# Patient Record
Sex: MALE | Race: WHITE | Employment: UNEMPLOYED | URBAN - METROPOLITAN AREA
[De-identification: names, ages, dates, MRNs, and addresses within clinical notes are randomized per-mention and may not be internally consistent; named-entity substitution may affect disease eponyms.]

---
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
## 2020-01-07 DIAGNOSIS — E84.0 CYSTIC FIBROSIS WITH PULMONARY MANIFESTATIONS (H): ICD-10-CM

## 2020-01-07 RX ORDER — NEBULIZER
1 EACH MISCELLANEOUS 3 TIMES DAILY
Qty: 1 EACH | Refills: 6 | Status: SHIPPED | OUTPATIENT
Start: 2020-01-07 | End: 2022-03-09

## 2020-01-13 DIAGNOSIS — E84.9 CYSTIC FIBROSIS (H): ICD-10-CM

## 2020-01-14 ENCOUNTER — TELEPHONE (OUTPATIENT)
Dept: PULMONOLOGY | Facility: CLINIC | Age: 20
End: 2020-01-14

## 2020-01-14 NOTE — TELEPHONE ENCOUNTER
Per call to [employer] they will not approve any new medications for 6 months after the date they are approved. There are no other appeal options unless the patient does a plan grievance.

## 2020-01-14 NOTE — TELEPHONE ENCOUNTER
PRIOR AUTHORIZATION DENIED    Medication: elexacaftor-tezacaftor-ivacaftor & ivacaftor (TRIKAFTA) 100-50-75 & 150 MG tablet pack (DENIED    Denial Date: 1/14/2020    Denial Rational: NOT A COVERED BENEFIT    Appeal Information: THERE MAY NOT BE ANY APPEAL OPTIONS

## 2020-01-14 NOTE — TELEPHONE ENCOUNTER
PA Initiation    Medication: elexacaftor-tezacaftor-ivacaftor & ivacaftor (TRIKAFTA) 100-50-75 & 150 MG tablet pack (pending)  Insurance Company: [employer] ([facility]) - Phone [phone] Fax [phone]  Pharmacy Filling the Rx: [facility] - [place] - [address] [place] [address]  Filling Pharmacy Phone:    Filling Pharmacy Fax:    Start Date: 1/14/2020

## 2020-01-31 ENCOUNTER — TELEPHONE (OUTPATIENT)
Dept: PULMONOLOGY | Facility: CLINIC | Age: 20
End: 2020-01-31

## 2020-01-31 NOTE — TELEPHONE ENCOUNTER
PA Initiation    Medication: Cyaston (needs PA through secondary-PENDING  Insurance Company: [facility] - Phone [phone] Fax [phone]  Pharmacy Filling the Rx: [facility] - [place], [place] - [address]  Filling Pharmacy Phone:    Filling Pharmacy Fax:    Start Date: 1/31/2020

## 2020-02-03 ENCOUNTER — ALLIED HEALTH/NURSE VISIT (OUTPATIENT)
Dept: CARE COORDINATION | Facility: CLINIC | Age: 20
End: 2020-02-03

## 2020-02-03 ENCOUNTER — OFFICE VISIT (OUTPATIENT)
Dept: PULMONOLOGY | Facility: CLINIC | Age: 20
End: 2020-02-03
Attending: PHYSICIAN ASSISTANT
Payer: COMMERCIAL

## 2020-02-03 VITALS
OXYGEN SATURATION: 96 % | RESPIRATION RATE: 17 BRPM | DIASTOLIC BLOOD PRESSURE: 77 MMHG | SYSTOLIC BLOOD PRESSURE: 119 MMHG | BODY MASS INDEX: 23.72 KG/M2 | HEIGHT: 68 IN | HEART RATE: 102 BPM | WEIGHT: 156.53 LBS

## 2020-02-03 DIAGNOSIS — E84.9 CF (CYSTIC FIBROSIS) (H): Primary | ICD-10-CM

## 2020-02-03 DIAGNOSIS — E84.0 CYSTIC FIBROSIS WITH PULMONARY MANIFESTATIONS (H): ICD-10-CM

## 2020-02-03 DIAGNOSIS — Z13.9 RISK AND FUNCTIONAL ASSESSMENT: Primary | ICD-10-CM

## 2020-02-03 LAB
EXPTIME-PRE: 7.58 SEC
FEF2575-%PRED-PRE: 81 %
FEF2575-PRE: 3.91 L/SEC
FEF2575-PRED: 4.81 L/SEC
FEFMAX-%PRED-PRE: 103 %
FEFMAX-PRE: 9.99 L/SEC
FEFMAX-PRED: 9.63 L/SEC
FEV1-%PRED-PRE: 90 %
FEV1-PRE: 3.93 L
FEV1FEV6-PRE: 83 %
FEV1FEV6-PRED: 85 %
FEV1FVC-PRE: 83 %
FEV1FVC-PRED: 86 %
FIFMAX-PRE: 7.47 L/SEC
FUNGUS SPEC CULT: NORMAL
FUNGUS SPEC CULT: NORMAL
FVC-%PRED-PRE: 93 %
FVC-PRE: 4.75 L
FVC-PRED: 5.11 L
SPECIMEN SOURCE: NORMAL

## 2020-02-03 PROCEDURE — 87186 SC STD MICRODIL/AGAR DIL: CPT | Performed by: PHYSICIAN ASSISTANT

## 2020-02-03 PROCEDURE — 87077 CULTURE AEROBIC IDENTIFY: CPT | Performed by: PHYSICIAN ASSISTANT

## 2020-02-03 PROCEDURE — 87070 CULTURE OTHR SPECIMN AEROBIC: CPT | Performed by: PHYSICIAN ASSISTANT

## 2020-02-03 RX ORDER — TOBRAMYCIN INHALATION SOLUTION 300 MG/5ML
300 INHALANT RESPIRATORY (INHALATION) 2 TIMES DAILY
Qty: 280 ML | Refills: 6 | Status: SHIPPED | OUTPATIENT
Start: 2020-02-03 | End: 2020-02-17

## 2020-02-03 ASSESSMENT — MIFFLIN-ST. JEOR: SCORE: 1694.5

## 2020-02-03 ASSESSMENT — PAIN SCALES - GENERAL: PAINLEVEL: NO PAIN (0)

## 2020-02-03 NOTE — PATIENT INSTRUCTIONS
Cystic Fibrosis Self-Care Plan       Patient: Keon Conway   MRN: [id number]   Clinic Date: February 3, 2020     RECOMMENDATIONS:  1. Continue nebulizers and vest therapy.   2. Keep up the good work!    Annual Studies:   IGG   Date Value Ref Range Status   05/08/2019 1,120 695 - 1,620 mg/dL Final     Insulin   Date Value Ref Range Status   05/08/2019 5.7 3 - 25 mU/L Final     There are no preventive care reminders to display for this patient.    Pulmonary Function Tests  FEV1: amount of air you can blow out in 1 second  FVC: total amount of air you can take in and blow out    Your Goals:         PFT Latest Ref Rng & Units 2/3/2020   FVC L 4.75   FEV1 L 3.93   FVC% % 93   FEV1% % 90          Airway Clearance: The Most Important Way to Keep Your Lungs Healthy  Vest Settings:    Hill-Rom Frequencies: 8, 9, 10 Pressure 10 Then, Frequencies 18, 19, 20 Pressure 6      RespirTech: Quick Start with Pressure of     Do each frequency for 5 minutes; Deflate vest after each frequency & cough 3 times before beginning the next setting.    Vest and Neb Therapy should be done 2 times/day.    Good Nutrition Can Improve Lung Function and Overall Health     Take ALL of your vitamins with food     Take 1/2 of your enzymes before EVERY meal/snack and the other 1/2 mid-meal/snack    Wt Readings from Last 3 Encounters:   02/03/20 71 kg (156 lb 8.4 oz)   11/04/19 70.1 kg (154 lb 8.7 oz) (49 %)*   08/13/19 70 kg (154 lb 5.2 oz) (50 %)*     * Growth percentiles are based on CDC (Boys, 2-20 Years) data.       Body mass index is 23.8 kg/m .         National CF Foundation Recommendations for BMI in CF Adults: Women: at least 22 Men: at least 23        Controlling Blood Sugars Helps Prevent Lung Infections & Improves Nutrition  Test blood sugar:     In the morning before eating (goal is )     2 hours after a meal (goal is less than 150)     When pre-meal glucose is greater than 150 add correction     At bedtime (if less than 100  eat a snack with 15 grams of carbohydrates  Last A1C Results:   Hemoglobin A1C   Date Value Ref Range Status   07/26/2019 5.1 0 - 5.6 % Final     Comment:     Normal <5.7% Prediabetes 5.7-6.4%  Diabetes 6.5% or higher - adopted from ADA   consensus guidelines.           If diabetic, measure A1C every 6 months. Goal is under 7%.    Staying Healthy    Research: If you are interested in learning about research opportunities or have questions, please contact Lily Daniels at [phone] or [email or web address].       Foundation: Compass is a personalized resource service to help you with the insurance, financial, legal and other issues you are facing.  It's free, confidential and available to anyone with CF.  Ask your  for more information or contact Compass directly at [facility] ([phone]) or compass@cff.org, or learn more at cff.org/compass.       CF Nurse Line:  Pradeep Ken: [phone]   Marcelina Sales, RT: [phone]     Kaye Stark and Willow Mendes , Dieticians: [phone]     Mindy Pedro, Diabetes Nurse: [phone]    Felicia Garcia: [phone] or Lakisha Lay at [phone], Social Workers   [email or web address]

## 2020-02-03 NOTE — PROGRESS NOTES
[facility]  [facility]  February 3, 2020         Assessment and Plan:   Keon Conway is a 20 year old male with cystic fibrosis who is seen today in clinic for routine follow up.    1. CF lung disease: feels at baseline with intermittent cough, no congestion or dyspnea. Sating 96% on room air; vesting BID. PFTtoday stable at his recent best/bseline. 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 No evidence of exacerbation.  - Add on fungal culture today if able  - Continue current nebulizers, Qvar and vest therapy  - Alternating [place] and noemi nebs  - Chronic azithromycin 500 mg three times/week     2. CFTR modulation: received two months of Trikafta and felt well on it without adverse events. No longer on medication secondary to issues with insurance.  - Revisit Trikafta in April      3. Reactive hypoglycemia: with normal AIC and fasting BS, but 2 hour BS of 36 on OGTT and patient completely asymptomatic. Had a BS in the 60s inpatient, not symptomatic. No symptoms at this time.  - Repeat OGTT at annuals     4. Exocrine pancreatic insufficiency: with h/o malnutrition s/p G tube placement. Doing 2 cans of TF about 4-5 times/week now that the tube is not kinking with Reliazorb cartridge. Feels his appetite is good. BMI > Foundation goal.   - Continue pancreatic enzymes and vitamin supplementation  - On nocturnal feelds     5. Hypertriglyceridemia: elevated since 2005, stable this year. Unclear etiology, no h/o DM.   - Monitor with annual studies     RTC: 3 months  Annual studies: July 2020  Vaccinations: influenza complete     Nita Cedeño PA-C  Pulmonary, Allergy, Critical Care and Sleep Medicine        Interval History:   Cough is baseline, minimally productive, no congestion, tightness or shortness of breath. Vesting BID.Getting in 2 cans, 4-5 times/week.  No nausea, bloating or gas. Having 1  stool/day.          Review of Systems:   Please see HPI. Otherwise, complete 10 point ROS negative.           Past Medical and Surgical History:     Past Medical History:   Diagnosis Date     CF (cystic fibrosis) (H) 11/30/2011     Chronic maxillary sinusitis 11/30/2011     Exocrine pancreatic insufficiency 11/30/2011     Past Surgical History:   Procedure Laterality Date     BRONCHOSCOPY (RIGID OR FLEXIBLE), DIAGNOSTIC N/A 10/2/2017    Procedure: COMBINED BRONCHOSCOPY (RIGID OR FLEXIBLE), LAVAGE;  Flexible Bronchoscopy With Lavage, PICC Line Placement ;  Surgeon: Darrel Dudley MD;  [facility]     CATARACT IOL, RT/LT Left      EXAM UNDER ANESTHESIA EYE(S) Left 3/17/2015    Procedure: EXAM UNDER ANESTHESIA EYE(S);  Surgeon: Aamir Taylor MD;  [facility]     HERNIA REPAIR  December 2014     INSERT PICC LINE Right 10/2/2017    Procedure: INSERT PICC LINE;;  Surgeon: Angeles Singh MD;  [facility]     LAPAROSCOPIC ASSISTED INSERTION TUBE GASTROTOMY N/A 10/16/2017    Procedure: LAPAROSCOPIC ASSISTED INSERTION TUBE GASTROSTOMY;  Laparoscopic Gastrostomy Tube Placement.;  Surgeon: Jeremy Obando MD;  [facility]     SCRUB ETHYLENEDIAMENETETRAACETIC (EDTA) Left 3/17/2015    Procedure: SCRUB ETHYLENEDIAMENETETRAACETIC (EDTA);  Surgeon: Aamir Taylor MD;  [facility]     SINUS SURGERY  3/2011           Family History:     Family History   Problem Relation Age of Onset     Diabetes Paternal Grandfather             Social History:     Social History     Socioeconomic History     Marital status: Single     Spouse name: Not on file     Number of children: Not on file     Years of education: Not on file     Highest education level: Not on file   Occupational History     Not on file   Social Needs     Financial resource strain: Not on file     Food insecurity:     Worry: Not on file     Inability: Not on file     Transportation needs:     Medical: Not on file     Non-medical: Not on  file   Tobacco Use     Smoking status: Never Smoker     Smokeless tobacco: Never Used   Substance and Sexual Activity     Alcohol use: No     Drug use: No     Sexual activity: Not on file   Lifestyle     Physical activity:     Days per week: Not on file     Minutes per session: Not on file     Stress: Not on file   Relationships     Social connections:     Talks on phone: Not on file     Gets together: Not on file     Attends Denominational service: Not on file     Active member of club or organization: Not on file     Attends meetings of clubs or organizations: Not on file     Relationship status: Not on file     Intimate partner violence:     Fear of current or ex partner: Not on file     Emotionally abused: Not on file     Physically abused: Not on file     Forced sexual activity: Not on file   Other Topics Concern     Parent/sibling w/ CABG, MI or angioplasty before 65F 55M? Not Asked   Social History Narrative    Mom is 35, Dad 39, both healthy.            Medications:     Current Outpatient Medications   Medication     albuterol (VENTOLIN HFA) 108 (90 Base) MCG/ACT inhaler     amylase-lipase-protease (CREON) 50293-03500 units CPEP per EC capsule     azithromycin (ZITHROMAX) 250 MG tablet     aztreonam lysine (CAYSTON) 75 MG SOLR     beclomethasone (QVAR) 80 MCG/ACT Inhaler     blood glucose (ACCU-CHEK GUIDE) test strip     blood glucose monitoring (ACCU-CHEK FASTCLIX) lancets     dornase alpha (PULMOZYME) 1 MG/ML neb solution     elexacaftor-tezacaftor-ivacaftor & ivacaftor (TRIKAFTA) 100-50-75 & 150 MG tablet pack     ipratropium - albuterol 0.5 mg/2.5 mg/3 mL (DUONEB) 0.5-2.5 (3) MG/3ML neb solution     mvw SOFTGELS  capsule     Nutritional Supplements (NUTREN 2.0)     JOSE ALTERA NEBULIZER SYSTEM MISC     polyethylene glycol (MIRALAX) powder     Respiratory Therapy Supplies (JOSE ALTERA NEBULIZER HANDSET) MISC     sodium chloride inhalant (HYPERSAL) 7 % NEBU neb solution     sodium chloride inhalant  "(HYPERSAL) 7 % NEBU neb solution     tobramycin, PF, (NAMRATA) 300 MG/5ML neb solution     triamcinolone (ARISTOCORT HP) 0.5 % external cream     Current Facility-Administered Medications   Medication     albuterol (PROVENTIL) neb solution 2.5 mg     aztreonam (AZACTAM) 1 g            Physical Exam:   /77   Pulse 102   Resp 17   Ht 1.727 m (5' 8\")   Wt 71 kg (156 lb 8.4 oz)   SpO2 96%   BMI 23.80 kg/m      GENERAL: alert, NAD  HEENT: NCAT, EOMI, anicteric sclera, no nasal edema or erythema, mild drainage in left nare; canals and TMs clear; no oral mucosal edema or erythema  Neck: no cervical or supraclavicular adenopathy  Respiratory: good air flow, very few scattered crackles  CV: RRR, S1S2, no murmurs noted  Abdomen: normoactive BS, soft and non tender   Lymph: no edema, + digital clubbing  Neuro: AAO X 3, CN 2-12 grossly intact  Psychiatric: normal affect, good eye contact  Skin: no rash, jaundice or lesions on limited exam         Data:   All laboratory and imaging data reviewed.      Cystic Fibrosis Culture  Specimen Description   Date Value Ref Range Status   11/04/2019 Throat  Final   08/13/2019 Throat  Final   07/29/2019 Sputum  Final   07/29/2019 Sputum  Final   07/29/2019 Sputum  Final   07/29/2019 Sputum  Final   07/29/2019 Sputum  Final    Culture Micro   Date Value Ref Range Status   11/04/2019 Moderate growth  Normal branden    Final   11/04/2019 Light growth  Pseudomonas aeruginosa   (A)  Final   11/04/2019 (A)  Final    Single colony  Pseudomonas aeruginosa, mucoid strain     11/04/2019 Light growth  Staphylococcus aureus   (A)  Final        PFT interpretation:  Maneuver: valid and meets ATS guidelines  Normal spirometry  Compared to prior: FEV1 of 3.93 is 60 ml below prior          "

## 2020-02-03 NOTE — TELEPHONE ENCOUNTER
Prior Authorization Approval    Authorization Effective Date: 1/16/2020  Authorization Expiration Date: 1/15/2021  Medication: Cyaston (needs PA through secondary-Approved  Approved Dose/Quantity: 75mg  Reference #: PA Ref# [id number]   Insurance Company: [employer] - Phone [phone] Fax [phone]  Expected CoPay: $0     CoPay Card Available: No    Foundation Assistance Needed: N/A  Which Pharmacy is filling the prescription (Not needed for infusion/clinic administered): [facility] - [place], [place] - [address] [facility]   Pharmacy Notified: No  Patient Notified: No

## 2020-02-03 NOTE — LETTER
2/3/2020     RE: Keon Conway  [address]  [facility] [address]     Dear Colleague,    Thank you for referring your patient, Keon Conwya, to the [facility] at [facility]. Please see a copy of my visit note below.    [facility]  February 3, 2020         Assessment and Plan:   Keon Conway is a 20 year old male with cystic fibrosis who is seen today in clinic for routine follow up.    1. CF lung disease: feels at baseline with intermittent cough, no congestion or dyspnea. Sating 96% on room air; vesting BID. PFTtoday stable at his recent best/bseline. 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 No evidence of exacerbation.  - Add on fungal culture today if able  - Continue current nebulizers, Qvar and vest therapy  - Alternating [place] and noemi nebs  - Chronic azithromycin 500 mg three times/week     2. CFTR modulation: received two months of Trikafta and felt well on it without adverse events. No longer on medication secondary to issues with insurance.  - Revisit Trikafta in April      3. Reactive hypoglycemia: with normal AIC and fasting BS, but 2 hour BS of 36 on OGTT and patient completely asymptomatic. Had a BS in the 60s inpatient, not symptomatic. No symptoms at this time.  - Repeat OGTT at annuals     4. Exocrine pancreatic insufficiency: with h/o malnutrition s/p G tube placement. Doing 2 cans of TF about 4-5 times/week now that the tube is not kinking with Reliazorb cartridge. Feels his appetite is good. BMI  > Foundation goal.   - Continue pancreatic enzymes and vitamin supplementation  - On nocturnal feelds     5. Hypertriglyceridemia: elevated since 2005, stable this year. Unclear etiology, no h/o DM.   - Monitor with annual studies     RTC: 3 months  Annual studies: July  2020  Vaccinations: influenza complete     Nita Cedeño PA-C  Pulmonary, Allergy, Critical Care and Sleep Medicine        Interval History:   Cough is baseline, minimally productive, no congestion, tightness or shortness of breath. Vesting BID.Getting in 2 cans, 4-5 times/week.  No nausea, bloating or gas. Having 1 stool/day.          Review of Systems:   Please see HPI. Otherwise, complete 10 point ROS negative.           Past Medical and Surgical History:     Past Medical History:   Diagnosis Date     CF (cystic fibrosis) (H) 11/30/2011     Chronic maxillary sinusitis 11/30/2011     Exocrine pancreatic insufficiency 11/30/2011     Past Surgical History:   Procedure Laterality Date     BRONCHOSCOPY (RIGID OR FLEXIBLE), DIAGNOSTIC N/A 10/2/2017    Procedure: COMBINED BRONCHOSCOPY (RIGID OR FLEXIBLE), LAVAGE;  Flexible Bronchoscopy With Lavage, PICC Line Placement ;  Surgeon: Darrel Dudley MD;  [facility]     CATARACT IOL, RT/LT Left      EXAM UNDER ANESTHESIA EYE(S) Left 3/17/2015    Procedure: EXAM UNDER ANESTHESIA EYE(S);  Surgeon: Aamir Taylor MD;  [facility]     HERNIA REPAIR  December 2014     INSERT PICC LINE Right 10/2/2017    Procedure: INSERT PICC LINE;;  Surgeon: Angeles Singh MD;  [facility]     LAPAROSCOPIC ASSISTED INSERTION TUBE GASTROTOMY N/A 10/16/2017    Procedure: LAPAROSCOPIC ASSISTED INSERTION TUBE GASTROSTOMY;  Laparoscopic Gastrostomy Tube Placement.;  Surgeon: Jeremy Obando MD;  [facility]     SCRUB ETHYLENEDIAMENETETRAACETIC (EDTA) Left 3/17/2015    Procedure: SCRUB ETHYLENEDIAMENETETRAACETIC (EDTA);  Surgeon: Aamir Taylor MD;  [facility]     SINUS SURGERY  3/2011           Family History:     Family History   Problem Relation Age of Onset     Diabetes Paternal Grandfather             Social History:     Social History     Socioeconomic History     Marital status: Single     Spouse name: Not on file     Number of children: Not on file      Years of education: Not on file     Highest education level: Not on file   Occupational History     Not on file   Social Needs     Financial resource strain: Not on file     Food insecurity:     Worry: Not on file     Inability: Not on file     Transportation needs:     Medical: Not on file     Non-medical: Not on file   Tobacco Use     Smoking status: Never Smoker     Smokeless tobacco: Never Used   Substance and Sexual Activity     Alcohol use: No     Drug use: No     Sexual activity: Not on file   Lifestyle     Physical activity:     Days per week: Not on file     Minutes per session: Not on file     Stress: Not on file   Relationships     Social connections:     Talks on phone: Not on file     Gets together: Not on file     Attends Gnosticism service: Not on file     Active member of club or organization: Not on file     Attends meetings of clubs or organizations: Not on file     Relationship status: Not on file     Intimate partner violence:     Fear of current or ex partner: Not on file     Emotionally abused: Not on file     Physically abused: Not on file     Forced sexual activity: Not on file   Other Topics Concern     Parent/sibling w/ CABG, MI or angioplasty before 65F 55M? Not Asked   Social History Narrative    Mom is 35, Dad 39, both healthy.            Medications:     Current Outpatient Medications   Medication     albuterol (VENTOLIN HFA) 108 (90 Base) MCG/ACT inhaler     amylase-lipase-protease (CREON) 05763-54643 units CPEP per EC capsule     azithromycin (ZITHROMAX) 250 MG tablet     aztreonam lysine (CAYSTON) 75 MG SOLR     beclomethasone (QVAR) 80 MCG/ACT Inhaler     blood glucose (ACCU-CHEK GUIDE) test strip     blood glucose monitoring (ACCU-CHEK FASTCLIX) lancets     dornase alpha (PULMOZYME) 1 MG/ML neb solution     elexacaftor-tezacaftor-ivacaftor & ivacaftor (TRIKAFTA) 100-50-75 & 150 MG tablet pack     ipratropium - albuterol 0.5 mg/2.5 mg/3 mL (DUONEB) 0.5-2.5 (3) MG/3ML neb solution      "mvw SOFTGELS  capsule     Nutritional Supplements (NUTREN 2.0)     JOSE ALTERA NEBULIZER SYSTEM MISC     polyethylene glycol (MIRALAX) powder     Respiratory Therapy Supplies (JOSE ALTERA NEBULIZER HANDSET) MISC     sodium chloride inhalant (HYPERSAL) 7 % NEBU neb solution     sodium chloride inhalant (HYPERSAL) 7 % NEBU neb solution     tobramycin, PF, (NAMRATA) 300 MG/5ML neb solution     triamcinolone (ARISTOCORT HP) 0.5 % external cream     Current Facility-Administered Medications   Medication     albuterol (PROVENTIL) neb solution 2.5 mg     aztreonam (AZACTAM) 1 g            Physical Exam:   /77   Pulse 102   Resp 17   Ht 1.727 m (5' 8\")   Wt 71 kg (156 lb 8.4 oz)   SpO2 96%   BMI 23.80 kg/m       GENERAL: alert, NAD  HEENT: NCAT, EOMI, anicteric sclera, no nasal edema or erythema, mild drainage in left nare; canals and TMs clear; no oral mucosal edema or erythema  Neck: no cervical or supraclavicular adenopathy  Respiratory: good air flow, very few scattered crackles  CV: RRR, S1S2, no murmurs noted  Abdomen: normoactive BS, soft and non tender   Lymph: no edema, + digital clubbing  Neuro: AAO X 3, CN 2-12 grossly intact  Psychiatric: normal affect, good eye contact  Skin: no rash, jaundice or lesions on limited exam         Data:   All laboratory and imaging data reviewed.      Cystic Fibrosis Culture  Specimen Description   Date Value Ref Range Status   11/04/2019 Throat  Final   08/13/2019 Throat  Final   07/29/2019 Sputum  Final   07/29/2019 Sputum  Final   07/29/2019 Sputum  Final   07/29/2019 Sputum  Final   07/29/2019 Sputum  Final    Culture Micro   Date Value Ref Range Status   11/04/2019 Moderate growth  Normal branden    Final   11/04/2019 Light growth  Pseudomonas aeruginosa   (A)  Final   11/04/2019 (A)  Final    Single colony  Pseudomonas aeruginosa, mucoid strain     11/04/2019 Light growth  Staphylococcus aureus   (A)  Final        PFT interpretation:  Maneuver: valid and meets " ATS guidelines  Normal spirometry  Compared to prior: FEV1 of 3.93 is 60 ml below prior    Again, thank you for allowing me to participate in the care of your patient.      Sincerely,    Nita Cedeño PA-C

## 2020-02-06 LAB
BACTERIA SPEC CULT: ABNORMAL
SPECIMEN SOURCE: ABNORMAL

## 2020-02-10 ASSESSMENT — ANXIETY QUESTIONNAIRES
7. FEELING AFRAID AS IF SOMETHING AWFUL MIGHT HAPPEN: NOT AT ALL
GAD7 TOTAL SCORE: 0
5. BEING SO RESTLESS THAT IT IS HARD TO SIT STILL: NOT AT ALL
6. BECOMING EASILY ANNOYED OR IRRITABLE: NOT AT ALL
3. WORRYING TOO MUCH ABOUT DIFFERENT THINGS: NOT AT ALL
1. FEELING NERVOUS, ANXIOUS, OR ON EDGE: NOT AT ALL
2. NOT BEING ABLE TO STOP OR CONTROL WORRYING: NOT AT ALL

## 2020-02-10 ASSESSMENT — PATIENT HEALTH QUESTIONNAIRE - PHQ9
SUM OF ALL RESPONSES TO PHQ QUESTIONS 1-9: 0
5. POOR APPETITE OR OVEREATING: NOT AT ALL

## 2020-02-10 NOTE — PROGRESS NOTES
"Adult Cystic Fibrosis Program  Annual Psychosocial Assessment    Presenting Information:  KEON is an 20-year-old male with cystic fibrosis, presenting in CF clinic for a follow up with CF provider, Nita Rivas.  Met with Keon for annual psychosocial assessment. He was joined by his mother, and girlfriend (Miri) during this encounter.     Living situation:  Keon lives with his parents and brother in [place], [place].  His parents own the home. They have 2 dogs. He does not currently pay rent. He denies any related concerns.     Family Constellation:  Keon was raised by his biological parents.  He has 1 sibling(s): an older brother (20 years old).  He notes that all four of his grandparents are still living. His extended family all lives close by. He is not aware of anyone else in his family having CF.     Social Support:  Keon reports good social support.  He gets along well with family members and draws additional support from his girlfriend (Miri) and friends. He does not have any connections in the CF Community at this time.     Adjustment to Illness:  Keon was diagnosed with CF at age 18 months. He reports being somewhat sick often during his childhood with some hospitalizations.     Today he describes his current health status as \"good\" or baseline.  Clinically, he has mild lung disease and pancreatic insufficiency.  He typically does 2 vest treatment(s) per day.  He does not exercise regularly at this time.  He denies any health problems that interfere with activities of daily living.      Keon is open about his CF diagnosis.          Health Care Directive:  Keon has previously received Health Care Directive education. This SW provided overview of education, including concept/purpose of health care directive, default health care agents and how to complete a directive.  Keon is comfortable with his default health care agent(s) (parents) in absence of a directive. He is not currently " "interested in reviewing a health care directive.     Education:  Keon has completed high school. He does not currently have plans for further education. Previous SW provided education on availability of CF scholarship applications should he decide to pursue education in the future. We did not discuss this today.     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 Starting in the Spring and through November he works about 60-70 hours a week. He was laid off since it is the \"off season\" and has not been working this winter. 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 He denies related employment concerns at this time.      Finances:  Keon receives income from wages and also relies on parental support. He does not currently pay rent, for food or medical needs/expenses. He denies any financial concerns at this time.     Insurance:  Keon is insured through his mother's employer policy as well as [facility] He denies any related concerns at this time. He was encouraged to call this SW to report changes/questions/concerns/needs should they arise.     Mental Health/Coping:  Keon denies any current or past symptoms indicative of mood, anxiety, eating, learning or other mental health disorder. He reports a positive and stable mood recently.    Keon was administered the following screens today in clinic:  RADHA-7: score of 0, indicating an absence of anxiety symptoms.   PHQ-9: score of 0, indicating an absence of depression symptoms.     He does not take any medications for mental health and does not see a therapist. Keon reports low/manageable stress levels. He was not able to identify any particular coping skills today.      Keon does not identify elizabeth/spirituality as important in his life but does attend Bahai \"every once in a " "while\".     Chemical Health:  Keon denies the use of alcohol, tobacco or other drugs.     Leisure Activities/Interests:   Keon enjoys going ice fishing and snowmobiling.     Intervention:  -Psychosocial Assessment  -Health Care Directive education    Assessment:  Keon presented with a normal affect and appeared to be open in his responses. He seems to be psychosocially stable overall, with access to relevant resources and supports.  No concerns expressed/noted.    Plan:  Re-consult for any psychosocial needs that may arise.    Complete psychosocial assessment annually.  Continue to follow for regular clinic consult.    JAE Katz, [facility]  Adult Cystic Fibrosis   Ph: [phone], Pager: [phone]            "

## 2020-02-11 ASSESSMENT — ANXIETY QUESTIONNAIRES: GAD7 TOTAL SCORE: 0

## 2020-02-24 ENCOUNTER — TELEPHONE (OUTPATIENT)
Dept: PULMONOLOGY | Facility: CLINIC | Age: 20
End: 2020-02-24

## 2020-02-24 NOTE — TELEPHONE ENCOUNTER
Per chart review, correspondence with patient via [employer] on 2/17/20 regarding prescription for Elgin and Pulmozyme be sent to [facility]. Prescriptions sent to patient's requested pharmacy on 2/17/20.    2/3/20 office visit note by Nita Cedeño reports patient was on Trikafta for 2 months but has since stopped due to insurance. Provider will discuss in April.    Agatha Shipley RN

## 2020-02-24 NOTE — TELEPHONE ENCOUNTER
[facility] Call Center    Phone Message    May a detailed message be left on voicemail: yes     Reason for Call: Medication Question or concern regarding medication   Prescription Clarification  Name of Medication: Elgin, Trikafta, and Pulmozyme  Prescribing Provider: Dr. Cedeño   Pharmacy: [facility]   What on the order needs clarification? Nikia states these 3 medications will need to filled from [facility]. She is submitting the override for this now.           Action Taken: Message routed to:  [facility] Pulm    Travel Screening: Not Applicable

## 2020-03-09 DIAGNOSIS — E84.9 CYSTIC FIBROSIS (H): ICD-10-CM

## 2020-03-09 RX ORDER — ELEXACAFTOR, TEZACAFTOR, AND IVACAFTOR 100-50-75
KIT ORAL
Qty: 84 TABLET | Refills: 11 | Status: SHIPPED | OUTPATIENT
Start: 2020-03-09 | End: 2020-03-10

## 2020-03-09 NOTE — TELEPHONE ENCOUNTER
Prior Authorization Approval    Authorization Effective Date: 3/9/2020  Authorization Expiration Date: 3/9/2021  Medication: elexacaftor-tezacaftor-ivacaftor & ivacaftor (TRIKAFTA) 100-50-75 & 150 MG tablet pack (APPROVED  Approved Dose/Quantity: 84 PER 28 DAYS  Reference #:     Insurance Company: [employer] ([facility]) - Phone [phone] Fax [phone]  Expected CoPay:       CoPay Card Available: Yes    Foundation Assistance Needed:    Which Pharmacy is filling the prescription (Not needed for infusion/clinic administered): [facility] - [address] [place] [address]  Pharmacy Notified:    Patient Notified:

## 2020-03-10 DIAGNOSIS — E84.9 CYSTIC FIBROSIS (H): ICD-10-CM

## 2020-03-10 RX ORDER — ELEXACAFTOR, TEZACAFTOR, AND IVACAFTOR 100-50-75
KIT ORAL
Qty: 84 TABLET | Refills: 11 | Status: SHIPPED | OUTPATIENT
Start: 2020-03-10 | End: 2021-03-16

## 2020-04-14 ENCOUNTER — TELEPHONE (OUTPATIENT)
Dept: PULMONOLOGY | Facility: CLINIC | Age: 20
End: 2020-04-14

## 2020-04-14 NOTE — TELEPHONE ENCOUNTER
Per call to several people at [facility], Keon's insurance through [employer] mandates he fill with [facility] specialty pharmacy. Explained to [facility] that they need to get an override. Rep stated this is the MD responsibility and transferred me to Melissa disla. Who states Keon needs to call [phone] to get this override to fill. Re# [id number].

## 2020-05-22 ENCOUNTER — MYC MEDICAL ADVICE (OUTPATIENT)
Dept: PHARMACY | Facility: CLINIC | Age: 20
End: 2020-05-22

## 2020-05-26 NOTE — PROGRESS NOTES
Keon Conway is a 20 year old male who is being evaluated via a billable telephone visit.      1. CF lung disease: no new pulmonary complaints, has been back on Trikafta and feels well with dry cough, no congestion or dyspnea. Vesting BID.  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 No evidence of exacerbation.  - Continue current nebulizers, Qvar and vest therapy  - Alternating [place] and noemi nebs--on noemi   - Chronic azithromycin 500 mg three times/week     2. 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 Has a plan for labs later this week.   - Will f/u on labs  - Continue Trikafta     3. Reactive hypoglycemia: with normal AIC and fasting BS, but 2 hour BS of 36 on OGTT and patient completely asymptomatic. Had a BS in the 60s inpatient, not symptomatic. No symptoms at this time.  - OGTT at next f/u     4. Exocrine pancreatic insufficiency: with h/o malnutrition s/p G tube placement. Doing 2 cans of TF about 4 times/week. Has not noticed a significant weight gain on Trikafta.  - Continue pancreatic enzymes and vitamin supplementation  - On nocturnal feelds     5. Hypertriglyceridemia: elevated since 2005, stable this year. Unclear etiology, no h/o DM.   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 Cough is minimal, not productive. No congestion or tightness. No shortness of breath, no fever or chills. Vesting is BID. No nausea or bloating. No change in stools, not loose. Has one/day, not fatty or floating. Hasn't weighed himself.     I have reviewed and updated the patient's Past Medical History, Social History, Family History and Medication  "List.    ALLERGIES  Meropenem; Amoxicillin; Cefepime; Penicillins; Sulfa drugs; Tobramycin; and Vancomycin    I have reviewed the note as documented above.  This accurately captures the substance of my conversation with the patient.    Phone call contact time  Call Started at 10:38  Call Ended at 10:49    Nita CONDONANA  [facility]    The patient has been notified of following: \"This telephone visit will be conducted via a call between you and your physician/provider. We have found that certain health care needs can be provided without the need for a physical exam.  This service lets us provide the care you need with a short phone conversation.  If a prescription is necessary we can send it directly to your pharmacy.  If lab work is needed we can place an order for that and you can then stop by our lab to have the test done at a later time. If during the course of the call the physician/provider feels a telephone visit is not appropriate, you will not be charged for this service.\"   "

## 2020-05-27 ENCOUNTER — VIRTUAL VISIT (OUTPATIENT)
Dept: PULMONOLOGY | Facility: CLINIC | Age: 20
End: 2020-05-27
Attending: PHYSICIAN ASSISTANT
Payer: COMMERCIAL

## 2020-05-27 DIAGNOSIS — R73.09 ABNORMAL GLUCOSE TOLERANCE TEST: ICD-10-CM

## 2020-05-27 DIAGNOSIS — K86.81 EXOCRINE PANCREATIC INSUFFICIENCY: ICD-10-CM

## 2020-05-27 DIAGNOSIS — E84.9 CYSTIC FIBROSIS (H): Primary | ICD-10-CM

## 2020-05-27 PROCEDURE — 97803 MED NUTRITION INDIV SUBSEQ: CPT | Mod: TEL,ZF | Performed by: DIETITIAN, REGISTERED

## 2020-05-27 PROCEDURE — 40001009 ZZH VIDEO/TELEPHONE VISIT; NO CHARGE: Mod: 95

## 2020-05-27 NOTE — PATIENT INSTRUCTIONS
Cystic Fibrosis Self-Care Plan       Patient: Keon Conway   MRN: [id number]   Clinic Date: May 27, 2020     RECOMMENDATIONS:  1. Continue nebulizers and vest therapy.   2. Make sure to get your blood draw this week!  3. Enjoy the summer and stay safe/home.    Annual Studies:   IGG   Date Value Ref Range Status   05/08/2019 1,120 695 - 1,620 mg/dL Final     Insulin   Date Value Ref Range Status   05/08/2019 5.7 3 - 25 mU/L Final     There are no preventive care reminders to display for this patient.    Pulmonary Function Tests  FEV1: amount of air you can blow out in 1 second  FVC: total amount of air you can take in and blow out    Your Goals:         PFT Latest Ref Rng & Units 2/3/2020   FVC L 4.75   FEV1 L 3.93   FVC% % 93   FEV1% % 90          Airway Clearance: The Most Important Way to Keep Your Lungs Healthy  Vest Settings:    Hill-Rom Frequencies: 8, 9, 10 Pressure 10 Then, Frequencies 18, 19, 20 Pressure 6      RespirTech: Quick Start with Pressure of     Do each frequency for 5 minutes; Deflate vest after each frequency & cough 3 times before beginning the next setting.    Vest and Neb Therapy should be done 2 times/day.    Good Nutrition Can Improve Lung Function and Overall Health     Take ALL of your vitamins with food     Take 1/2 of your enzymes before EVERY meal/snack and the other 1/2 mid-meal/snack    Wt Readings from Last 3 Encounters:   02/03/20 71 kg (156 lb 8.4 oz)   11/04/19 70.1 kg (154 lb 8.7 oz) (49 %, Z= -0.02)*   08/13/19 70 kg (154 lb 5.2 oz) (50 %, Z= 0.00)*     * Growth percentiles are based on CDC (Boys, 2-20 Years) data.       There is no height or weight on file to calculate BMI.         National CF Foundation Recommendations for BMI in CF Adults: Women: at least 22 Men: at least 23        Controlling Blood Sugars Helps Prevent Lung Infections & Improves Nutrition  Test blood sugar:     In the morning before eating (goal is )     2 hours after a meal (goal is less  than 150)     When pre-meal glucose is greater than 150 add correction     At bedtime (if less than 100 eat a snack with 15 grams of carbohydrates  Last A1C Results:   Hemoglobin A1C   Date Value Ref Range Status   07/26/2019 5.1 0 - 5.6 % Final     Comment:     Normal <5.7% Prediabetes 5.7-6.4%  Diabetes 6.5% or higher - adopted from ADA   consensus guidelines.           If diabetic, measure A1C every 6 months. Goal is under 7%.    Staying Healthy    Research: If you are interested in learning about research opportunities or have questions, please contact Lily Daniels at [phone] or [email or web address].       Foundation: Compass is a personalized resource service to help you with the insurance, financial, legal and other issues you are facing.  It's free, confidential and available to anyone with CF.  Ask your  for more information or contact Compass directly at [address] ([phone]) or compass@cff.org, or learn more at cff.org/compass.       CF Nurse Line:  Gabby Ken and Maryann: [phone]   Marcelina Sales, RT: [phone]     Kaye Stark and Willow Mendes , Dieticians: [phone]     Mindy Pedro, Diabetes Nurse: [phone]    Felicia Garcia: [phone] or Lakisha Lay at [phone], Social Workers   [email or web address]

## 2020-05-27 NOTE — LETTER
"    5/27/2020         RE: Keon Conway  [address] [facility] [facility] [facility] [address]        Dear Colleague,    Thank you for referring your patient, Keon Conway, to the [facility] FOR LUNG SCIENCE AND HEALTH. Please see a copy of my visit note below.    CF Annual Nutrition Assessment    Reason for Assessment  Assessed during [facility] r/t increased nutrition risk with diagnosis of CF per protocol    Keon Gallo is a 20 year old male who is being evaluated via a billable telephone visit.      The patient has been notified of following:     \"This telephone visit will be conducted via a call between you and your physician/provider. We have found that certain health care needs can be provided without the need for a physical exam.  This service lets us provide the care you need with a short phone conversation.  If a prescription is necessary we can send it directly to your pharmacy.  If lab work is needed we can place an order for that and you can then stop by our lab to have the test done at a later time.    Telephone visits are billed at different rates depending on your insurance coverage. During this emergency period, for some insurers they may be billed the same as an in-person visit.  Please reach out to your insurance provider with any questions.    If during the course of the call the physician/provider feels a telephone visit is not appropriate, you will not be charged for this service.\"    Patient has given verbal consent for Telephone visit?  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   Food Insecurity:  None    Anthropometric Assessment  Height:   Ht Readings from Last 1 Encounters:   02/03/20 1.727 m (5' 8\")     IBW based " "on BMI 22 for females and 23 for males per CF Foundation recs: 68.6 kg / 151#  Today's Pt Reported Weight: pt \"has not idea\" most recent wt of 71 kg (156#) %IBW: 103%    BMI: 23.8 kg/m2  Current weight is considered: Normal BMI and at CF goal  Weight history: Although there is no wt documented from the last 4 months, it appears before that the pt's weight was stable for the 6 months prior. Pt noted some wt gained after starting Trikafta about 1 month ago.   Wt Readings from Last 10 Encounters:   20 71 kg (156 lb 8.4 oz)   19 70.1 kg (154 lb 8.7 oz) (49 %, Z= -0.02)*   19 70 kg (154 lb 5.2 oz) (50 %, Z= 0.00)*   19 67.6 kg (149 lb 1.6 oz) (42 %, Z= -0.21)*   19 66.6 kg (146 lb 13.2 oz) (38 %, Z= -0.31)*   19 67.5 kg (148 lb 12.8 oz) (41 %, Z= -0.22)*   19 67.5 kg (148 lb 12.8 oz) (41 %, Z= -0.22)*   19 66.2 kg (145 lb 14.4 oz) (37 %, Z= -0.34)*   19 66.2 kg (145 lb 14.4 oz) (37 %, Z= -0.34)*   19 69.9 kg (154 lb) (51 %, Z= 0.02)*     * Growth percentiles are based on CDC (Boys, 2-20 Years) data.     Physical Activity/Exercise:  No designated exercise time but has active job    MALNUTRITION  % Intake:  No decreased intake noted  % Weight Loss:  Unable to assess d/t no documented wts over the last 4 months and no pt reported wt  Subcutaneous Fat Loss:  Unable to assess d/t telehealth visit   Muscle Loss:  Unable to assess d/t telehealth visit   Fluid Accumulation/Edema:  None noted  Malnutrition Diagnosis: Unable to determine due to COVID precautions and limited documented/reported wt history over last 4 months       Pancreatic Enzymes  Brand:  Creon [id number]  Dosin-7 with meals, 3-4 with snacks  Are you taking enzymes as recommended:Yes     High dose providing 2366 units lipase/kg/meal which is within the recommended range per CF Foundation to inhibit fibrosing colonopathy    Signs of Malabsorption:  No   Regarding Relizorb, patient has not been using this " device recently.  He has not attempted to reorder in months and does not know if his insurance has approved this for long-term coverage (note:  Pt can no longer get free product through the Relizorb Bridge program as this program was eliminated in October 2019).  In place of Relizorb he is taking Creon 24,000 3 capsules before and after TF cycle. Pt is not interested in pursuing getting Relizorb at this time.       Diet History and Assessment  Diet Preferences/Allergies.Intolerances: High Kcal/Pro.   Pt unable to meet nutritional needs with PO alone. Requires nutrition support with enteral nutrition in order to support weight gain/maintenance to CF goal for BMI >23 kg/m2.     Intake Recall/Comments:  Good appetite at baseline, reports typical intake BID-TID meals + 4-5 snacks daily. Mom does majority of cooking/grocery shopping. No change in PO even.    Diet Recall:  Breakfast- cereal with whole milk or leftovers from supper  Lunch- pack leftovers or 3-4 ham sandwiches to eat at work   Dinner- Mom cooks, usually meat/potatoes, hamburgers, or hotdish   Snacks- granola bars, yogurt, honey roasted peanut    Calcium: whole milk in AM on cereal + cheese throughout the day + yogurt (likely adequate)  Salt: 1-2 large bottles of Gatorade daily + lunch meat + bread + seasonings/sauces (likely adequate)   Hydration:  4-5 glasses water + 1-2 large bottle of Gatorade daily     Supplements:  Vanilla Ensure occasionally (1-2/week)     Enteral Nutrition:  Type of Feeding Tube: G-tube   Formula: Nutren 2.0  Rate/Frequency: 80 ml/hr x 6 hrs; infuses 4-5 nights per week  Tube feeding provides 500 mls, 1000 kcals (15 kcal/kg), 42 g protein (0.6 g/kg) and 46 g fat.  EN meeting ~30% of kcal and protein needs.      Enzymes: Creon 24: 3 caps before and after TF cycle      Estimated Energy and Protein Needs  Estimation based on weight maintenance/gain with Mild lung disease and pancreatic insufficient.     BEE: 1700 using DW 66  kg  5044-7559 kcals/day = 175-200% BEE + (500 kcals/day for weight gain)  100-130 g protein/day = 1.5-2 g/kg    Laboratory Assessment    Vitamin A   Date Value Ref Range Status   05/08/2019 0.73 0.30 - 1.20 mg/L Final     Vitamin D Deficiency screening   Date Value Ref Range Status   05/08/2019 24 20 - 75 ug/L Final     Comment:     Season, race, dietary intake, and treatment affect the concentration of   25-hydroxy-Vitamin D. Values may decrease during winter months and increase   during summer months. Values 20-29 ug/L may indicate Vitamin D insufficiency   and values <20 ug/L may indicate Vitamin D deficiency.  Vitamin D determination is routinely performed by an immunoassay specific for   25 hydroxyvitamin D3.  If an individual is on vitamin D2 (ergocalciferol)   supplementation, please specify 25 OH vitamin D2 and D3 level determination by   LCMSMS test VITD23.       Vitamin E   Date Value Ref Range Status   05/08/2019 12.6 5.5 - 18.0 mg/L Final     Comment:     (Note)  Test developed and characteristics determined by [facility]. See Compliance Statement B: [email or web address]       Iron   Date Value Ref Range Status   05/08/2019 180 35 - 180 ug/dL Final     Cholesterol   Date Value Ref Range Status   05/08/2019 118 <170 mg/dL Final     Triglycerides   Date Value Ref Range Status   05/08/2019 221 (H) <90 mg/dL Final     Comment:     Borderline high:   mg/dl  High:            >129 mg/dl       HDL Cholesterol   Date Value Ref Range Status   05/08/2019 33 (L) >45 mg/dL Final     Comment:     Low:             <40 mg/dl  Borderline low:   40-45 mg/dl       LDL Cholesterol Calculated   Date Value Ref Range Status   05/08/2019 41 <110 mg/dL Final     VLDL-Cholesterol   Date Value Ref Range Status   08/22/2013 39 (H) 0 - 30 mg/dL Final     Cholesterol/HDL Ratio   Date Value Ref Range Status   08/22/2013 3.5 0.0 - 5.0 Final     OGTT- 2019 with reactive hypoglycemia. Peaks at 189 at 0.5-hr down to 36 at  2-hr  DEXA- 2019, WNL    Current Vitamin/Mineral Prescription: MVW Complete D5,000 - 1 softgel per day  Comments: Obtains from [facility] for $10 per bottle    CF Related Diabetes Evaluation  Hgb A1C: 5.3   Date: 5/8/19  -- Keon asymptomatic with hypoglycemia noted on OGTT. Educated by CF RD on 5/8/19 for diet recs to prevent reactive hypoglycemia.   -- Seen by endocrine/Dr. Castro 6/18/19     NUTRITION DIAGNOSIS  Impaired nutrient utilization related to CF hypermetabolism and pancreatic insufficiency as evidenced by requires pancreatic enzyme replacement therapy, high kcal/protein diet and enteral nutrition in order to support weight maintenance at BMI >23 kg/m2.   INTERVENTIONS/RECOMMENDATIONS   1) PO intake: Discussed current nutritional status including review of adequacy of total calorie intake from OP and TF. Encouraged pt to continue with current TF regimen to maintain BMI >23 kg/m2.   2) Vitamins/annual studies: Continue with current vitamin regimen. Assess need for modification to regimen pending next annual studies (July 2020).   3) Trikafta:  Reviewed intake guidelines for Trikafta doses including 10-12 grams of fat per dose taken with enzymes, also every 12 hour dosing.  This was not new information for Keon and he was able to identify several fat containing snacks that he could take his dose with.    4) Enzymes: continue with current regimen as pt denies any signs of malabsorption.     5) Reactive hypoglycemia: OGTT planned for next follow up.    Patient Understanding: Good  Expected Compliance: Good  Follow-Up Plans: Per protocol     GOALS:  1) Weight maintenance to BMI of 22/23 kg/m2 or above.   2) 100% compliance with prescribed enzymes, vitamin/mineral supplements and insulin therapy.       FOLLOW-UP/MONITORING:  Visit patient within 6-12 month(s) or after annual studies     Phone Call Duration: 15 min    Shweta Alegre RD, LD (pager [phone])  Cystic Fibrosis/Lung Transplant Dietitian      -Available  Mon-Wednesday 8 AM-4:30 PM. On Thursdays and Fridays please contact pager [phone] (Willow Mendes RD) and on weekends/holidays contact coverage dietitian at pager [phone] (inpatient use only).       Again, thank you for allowing me to participate in the care of your patient.        Sincerely,        Kaye Stark RD

## 2020-05-27 NOTE — LETTER
5/27/2020         RE: Keon Conway  [address] [facility] [address]        Dear Colleague,    Thank you for referring your patient, Keon Conway, to the [facility] FOR LUNG SCIENCE AND HEALTH. Please see a copy of my visit note below.    Keon Conway is a 20 year old male who is being evaluated via a billable telephone visit.      1. CF lung disease: no new pulmonary complaints, has been back on Trikafta and feels well with dry cough, no congestion or dyspnea. Vesting BID.  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 No evidence of exacerbation.  - Continue current nebulizers, Qvar and vest therapy  - Alternating [place] and noemi nebs--on noemi   - Chronic azithromycin 500 mg three times/week     2. 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 Has a plan for labs later this week.   - Will f/u on labs  - Continue Trikafta     3. Reactive hypoglycemia: with normal AIC and fasting BS, but 2 hour BS of 36 on OGTT and patient completely asymptomatic. Had a BS in the 60s inpatient, not symptomatic. No symptoms at this time.  - OGTT at next f/u     4. Exocrine pancreatic insufficiency: with h/o malnutrition s/p G tube placement. Doing 2 cans of TF about 4 times/week. Has not noticed a significant weight gain on Trikafta.  - Continue pancreatic enzymes and vitamin supplementation  - On nocturnal feelds     5. Hypertriglyceridemia: elevated since 2005, stable this year. Unclear etiology, no h/o DM.   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 Cough is minimal, not productive. No congestion or tightness. No shortness of breath, no fever or chills.  "Vesting is BID. No nausea or bloating. No change in stools, not loose. Has one/day, not fatty or floating. Hasn't weighed himself.     I have reviewed and updated the patient's Past Medical History, Social History, Family History and Medication List.    ALLERGIES  Meropenem; Amoxicillin; Cefepime; Penicillins; Sulfa drugs; Tobramycin; and Vancomycin    I have reviewed the note as documented above.  This accurately captures the substance of my conversation with the patient.    Phone call contact time  Call Started at 10:38  Call Ended at 10:49    Nita Cedeño PA-C  \[facility] [place] [facility]\""    The patient has been notified of following: \"This telephone visit will be conducted via a call between you and your physician/provider. We have found that certain health care needs can be provided without the need for a physical exam.  This service lets us provide the care you need with a short phone conversation.  If a prescription is necessary we can send it directly to your pharmacy.  If lab work is needed we can place an order for that and you can then stop by our lab to have the test done at a later time. If during the course of the call the physician/provider feels a telephone visit is not appropriate, you will not be charged for this service.\"     Again, thank you for allowing me to participate in the care of your patient.        Sincerely,        Nita Cedeño PA-C    "

## 2020-05-29 ENCOUNTER — TRANSFERRED RECORDS (OUTPATIENT)
Dept: HEALTH INFORMATION MANAGEMENT | Facility: CLINIC | Age: 20
End: 2020-05-29

## 2020-05-29 LAB
ALBUMIN SERPL-MCNC: 4.4 G/DL
ALP SERPL-CCNC: 150 U/L
ALT SERPL-CCNC: 28 U/L
AST SERPL-CCNC: 25 U/L
BILIRUB SERPL-MCNC: 0.3 MG/DL
BILIRUBIN DIRECT: <0.2 MG/DL
PROT SERPL-MCNC: 7.5 G/DL

## 2020-06-01 ENCOUNTER — CLINICAL UPDATE (OUTPATIENT)
Dept: PHARMACY | Facility: CLINIC | Age: 20
End: 2020-06-01
Payer: COMMERCIAL

## 2020-06-01 DIAGNOSIS — E84.9 CF (CYSTIC FIBROSIS) (H): Primary | ICD-10-CM

## 2020-06-01 PROCEDURE — 99207 ZZC NO CHARGE LOS: CPT | Performed by: PHARMACIST

## 2020-06-01 NOTE — PROGRESS NOTES
Clinical Update:                                                    At the request of Nita Cedeño PA-C, a chart review was conducted for Keon Conway.    Reason for Chart Review: lab review and Trikafta update    Discussion: Keon had labs drawn 5/29/20 per recommended Trikafta monitoring.  All results are within normal limits.    Plan:  1. Continue Trikafta  2. Repeat hepatic panel and CK in 3 months.  3. Prescription renewal not needed at this time.    Patient notified via [employer].    Katelyn Adames PharmD  CF Medication Therapy Management Pharmacist  [facility]  [phone]

## 2020-06-02 NOTE — PROGRESS NOTES
"CF Annual Nutrition Assessment    Reason for Assessment  Assessed during [facility] r/t increased nutrition risk with diagnosis of CF per protocol    Keon Gallo is a 20 year old male who is being evaluated via a billable telephone visit.      The patient has been notified of following:     \"This telephone visit will be conducted via a call between you and your physician/provider. We have found that certain health care needs can be provided without the need for a physical exam.  This service lets us provide the care you need with a short phone conversation.  If a prescription is necessary we can send it directly to your pharmacy.  If lab work is needed we can place an order for that and you can then stop by our lab to have the test done at a later time.    Telephone visits are billed at different rates depending on your insurance coverage. During this emergency period, for some insurers they may be billed the same as an in-person visit.  Please reach out to your insurance provider with any questions.    If during the course of the call the physician/provider feels a telephone visit is not appropriate, you will not be charged for this service.\"    Patient has given verbal consent for Telephone visit?  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   Food Insecurity:  None    Anthropometric Assessment  Height:   Ht Readings from Last 1 Encounters:   02/03/20 1.727 m (5' 8\")     IBW based on BMI 22 for females and 23 for males per CF Foundation recs: 68.6 kg / 151#  Today's Pt Reported Weight: pt \"has not idea\" most recent wt of 71 kg (156#) %IBW: 103%    BMI: 23.8 kg/m2  Current weight is considered: Normal BMI and at CF goal  Weight history: " Although there is no wt documented from the last 4 months, it appears before that the pt's weight was stable for the 6 months prior. Pt noted some wt gained after starting Trikafta about 1 month ago.   Wt Readings from Last 10 Encounters:   20 71 kg (156 lb 8.4 oz)   19 70.1 kg (154 lb 8.7 oz) (49 %, Z= -0.02)*   19 70 kg (154 lb 5.2 oz) (50 %, Z= 0.00)*   19 67.6 kg (149 lb 1.6 oz) (42 %, Z= -0.21)*   19 66.6 kg (146 lb 13.2 oz) (38 %, Z= -0.31)*   19 67.5 kg (148 lb 12.8 oz) (41 %, Z= -0.22)*   19 67.5 kg (148 lb 12.8 oz) (41 %, Z= -0.22)*   19 66.2 kg (145 lb 14.4 oz) (37 %, Z= -0.34)*   19 66.2 kg (145 lb 14.4 oz) (37 %, Z= -0.34)*   19 69.9 kg (154 lb) (51 %, Z= 0.02)*     * Growth percentiles are based on [facility] (Boys, 2-20 Years) data.     Physical Activity/Exercise:  No designated exercise time but has active job    MALNUTRITION  % Intake:  No decreased intake noted  % Weight Loss:  Unable to assess d/t no documented wts over the last 4 months and no pt reported wt  Subcutaneous Fat Loss:  Unable to assess d/t telehealth visit   Muscle Loss:  Unable to assess d/t telehealth visit   Fluid Accumulation/Edema:  None noted  Malnutrition Diagnosis: Unable to determine due to COVID precautions and limited documented/reported wt history over last 4 months       Pancreatic Enzymes  Brand:  Creon [id number]  Dosin-7 with meals, 3-4 with snacks  Are you taking enzymes as recommended:Yes     High dose providing 2366 units lipase/kg/meal which is within the recommended range per CF Foundation to inhibit fibrosing colonopathy    Signs of Malabsorption:  No   Regarding Relizorb, patient has not been using this device recently.  He has not attempted to reorder in months and does not know if his insurance has approved this for long-term coverage (note:  Pt can no longer get free product through the Relizorb Bridge program as this program was eliminated in October  2019).  In place of Relizorb he is taking Creon 24,000 3 capsules before and after TF cycle. Pt is not interested in pursuing getting Relizorb at this time.       Diet History and Assessment  Diet Preferences/Allergies.Intolerances: High Kcal/Pro.   Pt unable to meet nutritional needs with PO alone. Requires nutrition support with enteral nutrition in order to support weight gain/maintenance to CF goal for BMI >23 kg/m2.     Intake Recall/Comments:  Good appetite at baseline, reports typical intake BID-TID meals + 4-5 snacks daily. Mom does majority of cooking/grocery shopping. No change in PO even.    Diet Recall:  Breakfast- cereal with whole milk or leftovers from supper  Lunch- pack leftovers or 3-4 ham sandwiches to eat at work   Dinner- Mom cooks, usually meat/potatoes, hamburgers, or hotdish   Snacks- granola bars, yogurt, honey roasted peanut    Calcium: whole milk in AM on cereal + cheese throughout the day + yogurt (likely adequate)  Salt: 1-2 large bottles of Gatorade daily + lunch meat + bread + seasonings/sauces (likely adequate)   Hydration:  4-5 glasses water + 1-2 large bottle of Gatorade daily     Supplements:  Vanilla Ensure occasionally (1-2/week)     Enteral Nutrition:  Type of Feeding Tube: G-tube   Formula: Nutren 2.0  Rate/Frequency: 80 ml/hr x 6 hrs; infuses 4-5 nights per week  Tube feeding provides 500 mls, 1000 kcals (15 kcal/kg), 42 g protein (0.6 g/kg) and 46 g fat.  EN meeting ~30% of kcal and protein needs.      Enzymes: Creon 24: 3 caps before and after TF cycle      Estimated Energy and Protein Needs  Estimation based on weight maintenance/gain with Mild lung disease and pancreatic insufficient.     BEE: 1700 using DW 66 kg  7144-9904 kcals/day = 175-200% BEE + (500 kcals/day for weight gain)  100-130 g protein/day = 1.5-2 g/kg    Laboratory Assessment    Vitamin A   Date Value Ref Range Status   05/08/2019 0.73 0.30 - 1.20 mg/L Final     Vitamin D Deficiency screening   Date Value  Ref Range Status   05/08/2019 24 20 - 75 ug/L Final     Comment:     Season, race, dietary intake, and treatment affect the concentration of   25-hydroxy-Vitamin D. Values may decrease during winter months and increase   during summer months. Values 20-29 ug/L may indicate Vitamin D insufficiency   and values <20 ug/L may indicate Vitamin D deficiency.  Vitamin D determination is routinely performed by an immunoassay specific for   25 hydroxyvitamin D3.  If an individual is on vitamin D2 (ergocalciferol)   supplementation, please specify 25 OH vitamin D2 and D3 level determination by   LCMSMS test VITD23.       Vitamin E   Date Value Ref Range Status   05/08/2019 12.6 5.5 - 18.0 mg/L Final     Comment:     (Note)  Test developed and characteristics determined by [facility]. See Compliance Statement B: [email or web address]       Iron   Date Value Ref Range Status   05/08/2019 180 35 - 180 ug/dL Final     Cholesterol   Date Value Ref Range Status   05/08/2019 118 <170 mg/dL Final     Triglycerides   Date Value Ref Range Status   05/08/2019 221 (H) <90 mg/dL Final     Comment:     Borderline high:   mg/dl  High:            >129 mg/dl       HDL Cholesterol   Date Value Ref Range Status   05/08/2019 33 (L) >45 mg/dL Final     Comment:     Low:             <40 mg/dl  Borderline low:   40-45 mg/dl       LDL Cholesterol Calculated   Date Value Ref Range Status   05/08/2019 41 <110 mg/dL Final     VLDL-Cholesterol   Date Value Ref Range Status   08/22/2013 39 (H) 0 - 30 mg/dL Final     Cholesterol/HDL Ratio   Date Value Ref Range Status   08/22/2013 3.5 0.0 - 5.0 Final     OGTT- 2019 with reactive hypoglycemia. Peaks at 189 at 0.5-hr down to 36 at 2-hr  DEXA- 2019, WNL    Current Vitamin/Mineral Prescription: MVW Complete D5,000 - 1 softgel per day  Comments: Obtains from [facility] for $10 per bottle    CF Related Diabetes Evaluation  Hgb A1C: 5.3   Date: 5/8/19  -- Keon asymptomatic with hypoglycemia noted on OGTT.  Educated by CF RD on 5/8/19 for diet recs to prevent reactive hypoglycemia.   -- Seen by endocrine/Dr. Castro 6/18/19     NUTRITION DIAGNOSIS  Impaired nutrient utilization related to CF hypermetabolism and pancreatic insufficiency as evidenced by requires pancreatic enzyme replacement therapy, high kcal/protein diet and enteral nutrition in order to support weight maintenance at BMI >23 kg/m2.   INTERVENTIONS/RECOMMENDATIONS   1) PO intake: Discussed current nutritional status including review of adequacy of total calorie intake from OP and TF. Encouraged pt to continue with current TF regimen to maintain BMI >23 kg/m2.   2) Vitamins/annual studies: Continue with current vitamin regimen. Assess need for modification to regimen pending next annual studies (July 2020).   3) Trikafta:  Reviewed intake guidelines for Trikafta doses including 10-12 grams of fat per dose taken with enzymes, also every 12 hour dosing.  This was not new information for Keon and he was able to identify several fat containing snacks that he could take his dose with.    4) Enzymes: continue with current regimen as pt denies any signs of malabsorption.     5) Reactive hypoglycemia: OGTT planned for next follow up.    Patient Understanding: Good  Expected Compliance: Good  Follow-Up Plans: Per protocol     GOALS:  1) Weight maintenance to BMI of 22/23 kg/m2 or above.   2) 100% compliance with prescribed enzymes, vitamin/mineral supplements and insulin therapy.       FOLLOW-UP/MONITORING:  Visit patient within 6-12 month(s) or after annual studies     Phone Call Duration: 15 min    Shweta Alegre RD, LD (pager [phone])  Cystic Fibrosis/Lung Transplant Dietitian      -Available Mon-Wednesday 8 AM-4:30 PM. On Thursdays and Fridays please contact pager [phone] (Willow Mendes RD) and on weekends/holidays contact coverage dietitian at pager [phone] (inpatient use only).

## 2020-07-23 ENCOUNTER — TELEPHONE (OUTPATIENT)
Dept: PULMONOLOGY | Facility: CLINIC | Age: 20
End: 2020-07-23

## 2020-07-23 NOTE — TELEPHONE ENCOUNTER
Prior Authorization Not Needed per Insurance    Medication: [employer]- NO PA NEEDED THROUGH [facility]  Insurance Company:    Expected CoPay:      Pharmacy Filling the Rx:    Pharmacy Notified:    Patient Notified:

## 2020-08-04 ENCOUNTER — TELEPHONE (OUTPATIENT)
Dept: PULMONOLOGY | Facility: CLINIC | Age: 20
End: 2020-08-04

## 2020-08-04 NOTE — TELEPHONE ENCOUNTER
Prior Authorization Retail Medication Request    Medication/Dose: Trikafta 100-50-75& 150 Mg tablets   ICD code (if different than what is on RX):    Previously Tried and Failed:    Rationale:      Insurance Name:    Insurance ID:        Pharmacy Information (if different than what is on RX)  Name:  [facility]  Phone:  [phone]      Prior Authorization Retail Medication Request    Medication/Dose:Tobramycin 300 mg/5ML mo    ICD code (if different than what is on RX):    Previously Tried and Failed:    Rationale:      Insurance Name:    Insurance ID:        Pharmacy Information (if different than what is on RX)  Name:  [facility]  Phone:  [phone]

## 2020-08-05 NOTE — TELEPHONE ENCOUNTER
Prior Authorization Not Needed per Insurance    Medication: Trikafta 100-50-75& 150 Mg tablets NO PA NEEDED PER [facility]  Insurance Company:    Expected CoPay:      Pharmacy Filling the Rx:    Pharmacy Notified:    Patient Notified:    PA is not needed for Trikafta

## 2020-08-05 NOTE — TELEPHONE ENCOUNTER
[facility] [employer] does in fact need a PA despite the EPA stating none was needed. Completed over the phone.    PA Initiation    Medication: [employer]- NO PA NEEDED THROUGH [facility]  Insurance Company:    Pharmacy Filling the Rx:    Filling Pharmacy Phone:    Filling Pharmacy Fax:    Start Date:

## 2020-08-20 DIAGNOSIS — E84.9 CF (CYSTIC FIBROSIS) (H): ICD-10-CM

## 2020-08-21 RX ORDER — AZITHROMYCIN 500 MG/1
TABLET, FILM COATED ORAL
Qty: 6 TABLET | Refills: 11 | Status: SHIPPED | OUTPATIENT
Start: 2020-08-21 | End: 2022-02-07

## 2020-08-25 ENCOUNTER — TELEPHONE (OUTPATIENT)
Dept: PHARMACY | Facility: CLINIC | Age: 20
End: 2020-08-25

## 2020-09-01 ENCOUNTER — TRANSFERRED RECORDS (OUTPATIENT)
Dept: HEALTH INFORMATION MANAGEMENT | Facility: CLINIC | Age: 20
End: 2020-09-01

## 2020-09-02 ENCOUNTER — EXTERNAL ORDER RESULTS (OUTPATIENT)
Dept: PULMONOLOGY | Facility: CLINIC | Age: 20
End: 2020-09-02

## 2020-09-02 LAB — CK TOTAL: 216 U/L

## 2020-09-09 ENCOUNTER — CLINICAL UPDATE (OUTPATIENT)
Dept: PHARMACY | Facility: CLINIC | Age: 20
End: 2020-09-09
Payer: COMMERCIAL

## 2020-09-09 DIAGNOSIS — E84.9 CF (CYSTIC FIBROSIS) (H): Primary | ICD-10-CM

## 2020-09-09 LAB
ALBUMIN SERPL-MCNC: 4.7 G/DL
ALP SERPL-CCNC: 139 U/L
ALT SERPL-CCNC: 28 U/L
AST SERPL-CCNC: 25 U/L
BILIRUB SERPL-MCNC: 0.5 MG/DL
BILIRUBIN DIRECT: 0.3 MG/DL
CK TOTAL: 216 U/L
PROT SERPL-MCNC: 7.7 G/DL

## 2020-09-09 PROCEDURE — 99207 ZZC NO CHARGE LOS: CPT | Performed by: PHARMACIST

## 2020-09-09 NOTE — PROGRESS NOTES
Reviewed most recent hepatic panel and CK labs, all WNl ok to continue Trikafta and re-check in early Dec which will be last quarterly lab draw. If normal in Dec ok to space out to yearly hepatic panel/ck. Patient notified via [place] of plan.    Lab Results   Component Value Date    AST 25 09/01/2020     Lab Results   Component Value Date    ALT 28 09/01/2020     No results found for: BILICONJ   Lab Results   Component Value Date    BILITOTAL 0.5 09/01/2020     Lab Results   Component Value Date    ALBUMIN 4.7 09/01/2020     Lab Results   Component Value Date    PROTTOTAL 7.7 09/01/2020      Lab Results   Component Value Date    ALKPHOS 139 09/01/2020     Dariela Pham PharmD  CF Clinic Pharmacist  Phone: [phone]  E-mail: [email or web address]

## 2020-10-01 DIAGNOSIS — E84.9 CF (CYSTIC FIBROSIS) (H): ICD-10-CM

## 2020-11-18 DIAGNOSIS — E84.9 CF (CYSTIC FIBROSIS) (H): ICD-10-CM

## 2020-11-22 ENCOUNTER — HEALTH MAINTENANCE LETTER (OUTPATIENT)
Age: 20
End: 2020-11-22

## 2020-12-06 NOTE — PROGRESS NOTES
[facility]  December 7, 2020         Assessment and Plan:   Keon Conway is a 20 year old male with cystic fibrosis who is seen today in clinic for routine follow up. He recovered from COVID in mid October.     1. CF lung disease: no new pulmonary complaints, tightness and dyspnea he had with COVID have resolved. Sating 99% on room air; vesting BID. PFTs are at his best. 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 No evidence of exacerbation.  - Continue current nebulizers and vest therapy  - Okay to stop Qvar and see if it is still necessary  - Alternating [place] and noemi nebs  - Chronic azithromycin 500 mg three times/week     2. 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 Labs today WNL.   - Continue Trikafta     3. Reactive hypoglycemia: with normal AIC and fasting BS, but 2 hour BS of 36 on OGTT last year and patient completely asymptomatic. AIC today of 5.1, did not do full OGTT.  - OGTT next year     4. Exocrine pancreatic insufficiency: with h/o malnutrition s/p G tube placement. Doing 2 cans of TF about 4 times/week. No s/s of malabsorption. BMI 23.53 today, > CFF gaol.  - Continue pancreatic enzymes and vitamin supplementation  - On nocturnal feelds     5. Hypertriglyceridemia: elevated since 2005, level today was not fasting.   - Fast lipid panel with next labs     6. HCM: reviewed labs with the patient including normal CBC, sed rate, CMP, CK, HDL, LDL, iron, but with elevated TSH, normal free T4. UA WNL, multiple labs pending.  - TFT recheck at next lab draw     RTC: 3 months  Annual: December 2021  Vaccinations: thinks he got the influenza vaccine     Nita Cedeño PA-C  Pulmonary, Allergy, Critical Care and Sleep Medicine        Interval History:     Overall patient is feeling well, occasional cough, mainly dry. No congestion, tightness. Occasional shortness of breath  with long walks (such as pulling fish house). Vesting BID. No nausea or bloating, no gas.         Review of Systems:   Please see HPI. Otherwise, complete 10 point ROS negative.           Past Medical and Surgical History:     Past Medical History:   Diagnosis Date     CF (cystic fibrosis) (H) 11/30/2011     Chronic maxillary sinusitis 11/30/2011     Exocrine pancreatic insufficiency 11/30/2011     Past Surgical History:   Procedure Laterality Date     BRONCHOSCOPY (RIGID OR FLEXIBLE), DIAGNOSTIC N/A 10/2/2017    Procedure: COMBINED BRONCHOSCOPY (RIGID OR FLEXIBLE), LAVAGE;  Flexible Bronchoscopy With Lavage, PICC Line Placement ;  Surgeon: Darrel Dudley MD;  Location: [facility] OR     CATARACT IOL, RT/LT Left      EXAM UNDER ANESTHESIA EYE(S) Left 3/17/2015    Procedure: EXAM UNDER ANESTHESIA EYE(S);  Surgeon: Aamir Taylor MD;  Location: [facility] OR     HERNIA REPAIR  December 2014     INSERT PICC LINE Right 10/2/2017    Procedure: INSERT PICC LINE;;  Surgeon: Angeles Singh MD;  Location: [facility] OR     LAPAROSCOPIC ASSISTED INSERTION TUBE GASTROTOMY N/A 10/16/2017    Procedure: LAPAROSCOPIC ASSISTED INSERTION TUBE GASTROSTOMY;  Laparoscopic Gastrostomy Tube Placement.;  Surgeon: Jeremy Obando MD;  Location: [facility] OR     SCRUB ETHYLENEDIAMENETETRAACETIC (EDTA) Left 3/17/2015    Procedure: SCRUB ETHYLENEDIAMENETETRAACETIC (EDTA);  Surgeon: Aamir Taylor MD;  Location: [facility] OR     SINUS SURGERY  3/2011           Family History:     Family History   Problem Relation Age of Onset     Diabetes Paternal Grandfather             Social History:     Social History     Socioeconomic History     Marital status: Single     Spouse name: Not on file     Number of children: Not on file     Years of education: Not on file     Highest education level: Not on file   Occupational History     Not on file   Social Needs     Financial resource strain: Not on file     Food insecurity     Worry: Not on file     Inability: Not on file      Transportation needs     Medical: Not on file     Non-medical: Not on file   Tobacco Use     Smoking status: Never Smoker     Smokeless tobacco: Never Used   Substance and Sexual Activity     Alcohol use: No     Drug use: No     Sexual activity: Not on file   Lifestyle     Physical activity     Days per week: Not on file     Minutes per session: Not on file     Stress: Not on file   Relationships     Social connections     Talks on phone: Not on file     Gets together: Not on file     Attends Latter day service: Not on file     Active member of club or organization: Not on file     Attends meetings of clubs or organizations: Not on file     Relationship status: Not on file     Intimate partner violence     Fear of current or ex partner: Not on file     Emotionally abused: Not on file     Physically abused: Not on file     Forced sexual activity: Not on file   Other Topics Concern     Parent/sibling w/ CABG, MI or angioplasty before 65F 55M? Not Asked   Social History Narrative    Mom is 35, Dad 39, both healthy.            Medications:     Current Outpatient Medications   Medication     albuterol (VENTOLIN HFA) 108 (90 Base) MCG/ACT inhaler     amylase-lipase-protease (CREON) 31928-14007 units CPEP per EC capsule     azithromycin (ZITHROMAX) 500 MG tablet     aztreonam lysine (CAYSTON) 75 MG SOLR     beclomethasone (QVAR) 80 MCG/ACT Inhaler     blood glucose (ACCU-CHEK GUIDE) test strip     blood glucose monitoring (ACCU-CHEK FASTCLIX) lancets     dornase alpha (PULMOZYME) 1 MG/ML neb solution     elexacaftor-tezacaftor-ivacaftor & ivacaftor (TRIKAFTA) 100-50-75 & 150 MG tablet pack     ipratropium - albuterol 0.5 mg/2.5 mg/3 mL (DUONEB) 0.5-2.5 (3) MG/3ML neb solution     mvw complete formulation (SOFTGELS ) capsule     Nutritional Supplements (NUTREN 2.0)     JOSE ALTERA NEBULIZER SYSTEM MISC     polyethylene glycol (MIRALAX) powder     Respiratory Therapy Supplies (JOSE ALTERA NEBULIZER HANDSET) MISC      sodium chloride inhalant (HYPERSAL) 7 % NEBU neb solution     tobramycin, PF, (NAMRATA) 300 MG/5ML neb solution     Current Facility-Administered Medications   Medication     albuterol (PROVENTIL) neb solution 2.5 mg     aztreonam (AZACTAM) 1 g            Physical Exam:   /78   Pulse 91   Resp 18   Wt 70.2 kg (154 lb 12.2 oz)   SpO2 99%   BMI 23.53 kg/m      GENERAL: alert, NAD  HEENT: NCAT, EOMI, anicteric sclera, no nasal edema or erythema; canals and TMs clear; no oral mucosal edema or erythema  Neck: no cervical or supraclavicular adenopathy  Respiratory: good air flow, no crackles, rhonchi or wheezing  CV: RRR, S1S2, no murmurs noted  Abdomen: normoactive BS, soft and non tender   Lymph: no edema, + digital clubbing  Neuro: AAO X 3, CN 2-12 grossly intact, 5/5 bilateral  strength and dorsiflexion  Psychiatric: normal affect, good eye contact  Skin: no rash, jaundice or lesions on limited exam         Data:   All laboratory and imaging data reviewed.      Cystic Fibrosis Culture  Specimen Description   Date Value Ref Range Status   02/03/2020 Throat  Final   02/03/2020 Throat  Final   11/04/2019 Throat  Final    Culture Micro   Date Value Ref Range Status   02/03/2020 Canceled, Test credited  Incorrect specimen type    Final   02/03/2020   Final    Notification of test cancellation was given to  Mindy in [facility]. They do not want a yeast culture instead. 2.3.20 at 1109 bw     02/03/2020 Moderate growth  Normal branden    Final   02/03/2020 (A)  Final    Moderate growth  Pseudomonas aeruginosa, mucoid strain     02/03/2020 Light growth  Pseudomonas aeruginosa   (A)  Final        PFT interpretation:  Maneuver: valid and meets ATS guidelines  Normal spirometry

## 2020-12-07 ENCOUNTER — OFFICE VISIT (OUTPATIENT)
Dept: PULMONOLOGY | Facility: CLINIC | Age: 20
End: 2020-12-07
Attending: PHYSICIAN ASSISTANT
Payer: COMMERCIAL

## 2020-12-07 VITALS
WEIGHT: 154.76 LBS | BODY MASS INDEX: 23.53 KG/M2 | DIASTOLIC BLOOD PRESSURE: 78 MMHG | RESPIRATION RATE: 18 BRPM | SYSTOLIC BLOOD PRESSURE: 129 MMHG | OXYGEN SATURATION: 99 % | HEART RATE: 91 BPM

## 2020-12-07 DIAGNOSIS — R73.09 ABNORMAL GLUCOSE TOLERANCE TEST: ICD-10-CM

## 2020-12-07 DIAGNOSIS — E84.9 CF (CYSTIC FIBROSIS) (H): Primary | ICD-10-CM

## 2020-12-07 DIAGNOSIS — K86.81 EXOCRINE PANCREATIC INSUFFICIENCY: ICD-10-CM

## 2020-12-07 DIAGNOSIS — E84.9 CYSTIC FIBROSIS (H): ICD-10-CM

## 2020-12-07 LAB
ALBUMIN SERPL-MCNC: 4.2 G/DL (ref 3.4–5)
ALBUMIN UR-MCNC: NEGATIVE MG/DL
ALP SERPL-CCNC: 137 U/L (ref 40–150)
ALT SERPL W P-5'-P-CCNC: 46 U/L (ref 0–70)
ANION GAP SERPL CALCULATED.3IONS-SCNC: 7 MMOL/L (ref 3–14)
APPEARANCE UR: CLEAR
AST SERPL W P-5'-P-CCNC: 21 U/L (ref 0–45)
BASOPHILS # BLD AUTO: 0 10E9/L (ref 0–0.2)
BASOPHILS NFR BLD AUTO: 0.4 %
BILIRUB UR QL STRIP: NEGATIVE
BUN SERPL-MCNC: 13 MG/DL (ref 7–30)
CALCIUM SERPL-MCNC: 9.3 MG/DL (ref 8.5–10.1)
CHLORIDE SERPL-SCNC: 106 MMOL/L (ref 94–109)
CHOLEST SERPL-MCNC: 133 MG/DL
CK SERPL-CCNC: 96 U/L (ref 30–300)
CO2 SERPL-SCNC: 28 MMOL/L (ref 20–32)
COLOR UR AUTO: YELLOW
CREAT SERPL-MCNC: 0.74 MG/DL (ref 0.66–1.25)
CREAT UR-MCNC: 65 MG/DL
DIFFERENTIAL METHOD BLD: ABNORMAL
EOSINOPHIL # BLD AUTO: 0.1 10E9/L (ref 0–0.7)
EOSINOPHIL NFR BLD AUTO: 1.1 %
ERYTHROCYTE [DISTWIDTH] IN BLOOD BY AUTOMATED COUNT: 12.1 % (ref 10–15)
ERYTHROCYTE [SEDIMENTATION RATE] IN BLOOD BY WESTERGREN METHOD: 4 MM/H (ref 0–15)
EXPTIME-PRE: 6.62 SEC
FEF2575-%PRED-PRE: 96 %
FEF2575-PRE: 4.64 L/SEC
FEF2575-PRED: 4.81 L/SEC
FEFMAX-%PRED-PRE: 110 %
FEFMAX-PRE: 10.61 L/SEC
FEFMAX-PRED: 9.63 L/SEC
FEV1-%PRED-PRE: 96 %
FEV1-PRE: 4.19 L
FEV1FEV6-PRE: 85 %
FEV1FEV6-PRED: 85 %
FEV1FVC-PRE: 85 %
FEV1FVC-PRED: 86 %
FIFMAX-PRE: 8.17 L/SEC
FVC-%PRED-PRE: 96 %
FVC-PRE: 4.94 L
FVC-PRED: 5.11 L
GFR SERPL CREATININE-BSD FRML MDRD: >90 ML/MIN/{1.73_M2}
GGT SERPL-CCNC: 40 U/L (ref 0–75)
GLUCOSE SERPL-MCNC: 95 MG/DL (ref 70–99)
GLUCOSE UR STRIP-MCNC: NEGATIVE MG/DL
HBA1C MFR BLD: 5.1 % (ref 0–5.6)
HCT VFR BLD AUTO: 41.8 % (ref 40–53)
HDLC SERPL-MCNC: 41 MG/DL
HGB BLD-MCNC: 14.3 G/DL (ref 13.3–17.7)
HGB UR QL STRIP: NEGATIVE
IMM GRANULOCYTES # BLD: 0 10E9/L (ref 0–0.4)
IMM GRANULOCYTES NFR BLD: 0.2 %
INR PPP: 1.03 (ref 0.86–1.14)
IRON SERPL-MCNC: 152 UG/DL (ref 35–180)
KETONES UR STRIP-MCNC: NEGATIVE MG/DL
LDLC SERPL CALC-MCNC: 51 MG/DL
LEUKOCYTE ESTERASE UR QL STRIP: NEGATIVE
LYMPHOCYTES # BLD AUTO: 2.2 10E9/L (ref 0.8–5.3)
LYMPHOCYTES NFR BLD AUTO: 47.2 %
MAGNESIUM SERPL-MCNC: 2 MG/DL (ref 1.6–2.3)
MCH RBC QN AUTO: 32.6 PG (ref 26.5–33)
MCHC RBC AUTO-ENTMCNC: 34.2 G/DL (ref 31.5–36.5)
MCV RBC AUTO: 95 FL (ref 78–100)
MICROALBUMIN UR-MCNC: <5 MG/L
MICROALBUMIN/CREAT UR: NORMAL MG/G CR (ref 0–17)
MONOCYTES # BLD AUTO: 0.4 10E9/L (ref 0–1.3)
MONOCYTES NFR BLD AUTO: 7.5 %
NEUTROPHILS # BLD AUTO: 2 10E9/L (ref 1.6–8.3)
NEUTROPHILS NFR BLD AUTO: 43.6 %
NITRATE UR QL: NEGATIVE
NONHDLC SERPL-MCNC: 92 MG/DL
NRBC # BLD AUTO: 0 10*3/UL
NRBC BLD AUTO-RTO: 0 /100
PH UR STRIP: 6 PH (ref 5–7)
PHOSPHATE SERPL-MCNC: 3.5 MG/DL (ref 2.5–4.5)
PLATELET # BLD AUTO: 347 10E9/L (ref 150–450)
POTASSIUM SERPL-SCNC: 3.7 MMOL/L (ref 3.4–5.3)
PROT SERPL-MCNC: 7.7 G/DL (ref 6.8–8.8)
RBC # BLD AUTO: 4.38 10E12/L (ref 4.4–5.9)
RBC #/AREA URNS AUTO: 1 /HPF (ref 0–2)
SODIUM SERPL-SCNC: 141 MMOL/L (ref 133–144)
SOURCE: NORMAL
SP GR UR STRIP: 1.01 (ref 1–1.03)
T4 FREE SERPL-MCNC: 0.8 NG/DL (ref 0.76–1.46)
TRIGL SERPL-MCNC: 206 MG/DL
TSH SERPL DL<=0.005 MIU/L-ACNC: 4.58 MU/L (ref 0.4–4)
UROBILINOGEN UR STRIP-MCNC: 0 MG/DL (ref 0–2)
WBC # BLD AUTO: 4.7 10E9/L (ref 4–11)
WBC #/AREA URNS AUTO: 0 /HPF (ref 0–5)

## 2020-12-07 PROCEDURE — 83735 ASSAY OF MAGNESIUM: CPT | Performed by: PATHOLOGY

## 2020-12-07 PROCEDURE — 80061 LIPID PANEL: CPT | Performed by: PATHOLOGY

## 2020-12-07 PROCEDURE — 84590 ASSAY OF VITAMIN A: CPT | Mod: 90 | Performed by: PATHOLOGY

## 2020-12-07 PROCEDURE — 84460 ALANINE AMINO (ALT) (SGPT): CPT | Performed by: PATHOLOGY

## 2020-12-07 PROCEDURE — 84443 ASSAY THYROID STIM HORMONE: CPT | Performed by: PATHOLOGY

## 2020-12-07 PROCEDURE — 81001 URINALYSIS AUTO W/SCOPE: CPT | Performed by: PATHOLOGY

## 2020-12-07 PROCEDURE — 83540 ASSAY OF IRON: CPT | Performed by: PATHOLOGY

## 2020-12-07 PROCEDURE — 84446 ASSAY OF VITAMIN E: CPT | Mod: 90 | Performed by: PATHOLOGY

## 2020-12-07 PROCEDURE — 82784 ASSAY IGA/IGD/IGG/IGM EACH: CPT | Mod: 90 | Performed by: PATHOLOGY

## 2020-12-07 PROCEDURE — 82043 UR ALBUMIN QUANTITATIVE: CPT | Performed by: PATHOLOGY

## 2020-12-07 PROCEDURE — 84403 ASSAY OF TOTAL TESTOSTERONE: CPT | Mod: 90 | Performed by: PATHOLOGY

## 2020-12-07 PROCEDURE — 94375 RESPIRATORY FLOW VOLUME LOOP: CPT | Performed by: PHYSICIAN ASSISTANT

## 2020-12-07 PROCEDURE — 84155 ASSAY OF PROTEIN SERUM: CPT | Performed by: PATHOLOGY

## 2020-12-07 PROCEDURE — 85652 RBC SED RATE AUTOMATED: CPT | Performed by: PATHOLOGY

## 2020-12-07 PROCEDURE — 36415 COLL VENOUS BLD VENIPUNCTURE: CPT | Performed by: PATHOLOGY

## 2020-12-07 PROCEDURE — 85025 COMPLETE CBC W/AUTO DIFF WBC: CPT | Performed by: PATHOLOGY

## 2020-12-07 PROCEDURE — 84075 ASSAY ALKALINE PHOSPHATASE: CPT | Performed by: PATHOLOGY

## 2020-12-07 PROCEDURE — 82785 ASSAY OF IGE: CPT | Mod: 90 | Performed by: PATHOLOGY

## 2020-12-07 PROCEDURE — 84450 TRANSFERASE (AST) (SGOT): CPT | Performed by: PATHOLOGY

## 2020-12-07 PROCEDURE — 84439 ASSAY OF FREE THYROXINE: CPT | Performed by: PATHOLOGY

## 2020-12-07 PROCEDURE — 82306 VITAMIN D 25 HYDROXY: CPT | Mod: 90 | Performed by: PATHOLOGY

## 2020-12-07 PROCEDURE — 80069 RENAL FUNCTION PANEL: CPT | Performed by: PATHOLOGY

## 2020-12-07 PROCEDURE — 87070 CULTURE OTHR SPECIMN AEROBIC: CPT | Performed by: PHYSICIAN ASSISTANT

## 2020-12-07 PROCEDURE — G0463 HOSPITAL OUTPT CLINIC VISIT: HCPCS | Mod: 25

## 2020-12-07 PROCEDURE — 99214 OFFICE O/P EST MOD 30 MIN: CPT | Mod: 25 | Performed by: PHYSICIAN ASSISTANT

## 2020-12-07 PROCEDURE — 85610 PROTHROMBIN TIME: CPT | Performed by: PATHOLOGY

## 2020-12-07 PROCEDURE — 82550 ASSAY OF CK (CPK): CPT | Performed by: PATHOLOGY

## 2020-12-07 PROCEDURE — 83036 HEMOGLOBIN GLYCOSYLATED A1C: CPT | Performed by: PATHOLOGY

## 2020-12-07 PROCEDURE — 82977 ASSAY OF GGT: CPT | Performed by: PATHOLOGY

## 2020-12-07 ASSESSMENT — PAIN SCALES - GENERAL: PAINLEVEL: NO PAIN (0)

## 2020-12-07 NOTE — NURSING NOTE
Chief Complaint   Patient presents with     RECHECK     Cystic fibrosis      Medications reviewed and updated.  Vitals taken  Liz Gardner CMA

## 2020-12-07 NOTE — PATIENT INSTRUCTIONS
Cystic Fibrosis Self-Care Plan       Patient: Keon Conway   MRN: [id number]   Clinic Date: December 7, 2020     RECOMMENDATIONS:  1. Continue nebulizers and vest therapy.   2. Okay to stop Qvar inhaler and see how you feel.   3. Fasting labs (nothing to eat or drink ofr 8 hours before) prior to next visit.     Annual Studies:   IGG   Date Value Ref Range Status   05/08/2019 1,120 695 - 1,620 mg/dL Final     Insulin   Date Value Ref Range Status   05/08/2019 5.7 3 - 25 mU/L Final     There are no preventive care reminders to display for this patient.    Pulmonary Function Tests  FEV1: amount of air you can blow out in 1 second  FVC: total amount of air you can take in and blow out    Your Goals:         PFT Latest Ref Rng & Units 12/7/2020   FVC L 4.94   FEV1 L 4.19   FVC% % 96   FEV1% % 96          Airway Clearance: The Most Important Way to Keep Your Lungs Healthy  Vest Settings:    Hill-Rom Frequencies: 8, 9, 10 Pressure 10 Then, Frequencies 18, 19, 20 Pressure 6      RespirTech: Quick Start with Pressure of     Do each frequency for 5 minutes; Deflate vest after each frequency & cough 3 times before beginning the next setting.    Vest and Neb Therapy should be done 2 times/day.    Good Nutrition Can Improve Lung Function and Overall Health     Take ALL of your vitamins with food     Take 1/2 of your enzymes before EVERY meal/snack and the other 1/2 mid-meal/snack    Wt Readings from Last 3 Encounters:   12/07/20 70.2 kg (154 lb 12.2 oz)   02/03/20 71 kg (156 lb 8.4 oz)   11/04/19 70.1 kg (154 lb 8.7 oz) (49 %, Z= -0.02)*     * Growth percentiles are based on CDC (Boys, 2-20 Years) data.       Body mass index is 23.53 kg/m .    National CF Foundation Recommendations for BMI in CF Adults: Women: at least 22 Men: at least 23        Controlling Blood Sugars Helps Prevent Lung Infections & Improves Nutrition  Test blood sugar:     In the morning before eating (goal is )     2 hours after a meal (goal  is less than 150)     When pre-meal glucose is greater than 150 add correction     At bedtime (if less than 100 eat a snack with 15 grams of carbohydrates  Last A1C Results:   Hemoglobin A1C   Date Value Ref Range Status   07/26/2019 5.1 0 - 5.6 % Final     Comment:     Normal <5.7% Prediabetes 5.7-6.4%  Diabetes 6.5% or higher - adopted from ADA   consensus guidelines.           If diabetic, measure A1C every 6 months. Goal is under 7%.    Staying Healthy    Research: If you are interested in learning about research opportunities or have questions, please contact Lily Daniels at [phone] or [email or web address].       Foundation: Compass is a personalized resource service to help you with the insurance, financial, legal and other issues you are facing.  It's free, confidential and available to anyone with CF.  Ask your  for more information or contact Compass directly at [address]-[facility] ([phone]) or compass@cff.org, or learn more at cff.org/compass.       CF Nurse Line: Rodney Ken: [phone]   Marcelina Sales, RT: [phone]     Kaye Stark and Willow Mendes , Dieticians: [phone]     Mindy Pedro, Diabetes Nurse: [phone]    Felicia Garcia: [phone] or Lakisha aLy at [phone], Social Workers   [email or web address]

## 2020-12-07 NOTE — LETTER
12/7/2020         RE: Keon Conway  [address]  [facility] [address]        Dear Colleague,    Thank you for referring your patient, Keon Conway, to the [facility] [place]. Please see a copy of my visit note below.    [facility]  December 7, 2020         Assessment and Plan:   Keon Conway is a 20 year old male with cystic fibrosis who is seen today in clinic for routine follow up. He recovered from COVID in mid October.     1. CF lung disease: no new pulmonary complaints, tightness and dyspnea he had with COVID have resolved. Sating 99% on room air; vesting BID. PFTs are at his best. 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 No evidence of exacerbation.  - Continue current nebulizers and vest therapy  - Okay to stop Qvar and see if it is still necessary  - Alternating [place] and noemi nebs  - Chronic azithromycin 500 mg three times/week     2. 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 Labs today WNL.   - Continue Trikafta     3. Reactive hypoglycemia: with normal AIC and fasting BS, but 2 hour BS of 36 on OGTT last year and patient completely asymptomatic. AIC today of 5.1, did not do full OGTT.  - OGTT next year     4. Exocrine pancreatic insufficiency: with h/o malnutrition s/p G tube placement. Doing 2 cans of TF about 4 times/week. No s/s of malabsorption. BMI 23.53 today, > CFF gaol.  - Continue pancreatic enzymes and vitamin supplementation  - On nocturnal feelds     5. Hypertriglyceridemia: elevated since 2005, level today was not fasting.   - Fast lipid panel with next labs     6. HCM: reviewed labs with the patient including normal CBC, sed rate, CMP, CK, HDL, LDL, iron, but with elevated TSH, normal free T4. UA WNL, multiple labs pending.  - TFT recheck at next lab draw     RTC: 3  months  Annual: December 2021  Vaccinations: thinks he got the influenza vaccine     Nita Cedeño PA-C  Pulmonary, Allergy, Critical Care and Sleep Medicine        Interval History:     Overall patient is feeling well, occasional cough, mainly dry. No congestion, tightness. Occasional shortness of breath with long walks (such as pulling fish house). Vesting BID. No nausea or bloating, no gas.         Review of Systems:   Please see HPI. Otherwise, complete 10 point ROS negative.           Past Medical and Surgical History:     Past Medical History:   Diagnosis Date     CF (cystic fibrosis) (H) 11/30/2011     Chronic maxillary sinusitis 11/30/2011     Exocrine pancreatic insufficiency 11/30/2011     Past Surgical History:   Procedure Laterality Date     BRONCHOSCOPY (RIGID OR FLEXIBLE), DIAGNOSTIC N/A 10/2/2017    Procedure: COMBINED BRONCHOSCOPY (RIGID OR FLEXIBLE), LAVAGE;  Flexible Bronchoscopy With Lavage, PICC Line Placement ;  Surgeon: Darrel Dudley MD;  [facility]     CATARACT IOL, RT/LT Left      EXAM UNDER ANESTHESIA EYE(S) Left 3/17/2015    Procedure: EXAM UNDER ANESTHESIA EYE(S);  Surgeon: Aamir Taylor MD;  [facility]     HERNIA REPAIR  December 2014     INSERT PICC LINE Right 10/2/2017    Procedure: INSERT PICC LINE;;  Surgeon: Angeles Singh MD;  [facility]     LAPAROSCOPIC ASSISTED INSERTION TUBE GASTROTOMY N/A 10/16/2017    Procedure: LAPAROSCOPIC ASSISTED INSERTION TUBE GASTROSTOMY;  Laparoscopic Gastrostomy Tube Placement.;  Surgeon: Jeremy Obando MD;  [facility]     SCRUB ETHYLENEDIAMENETETRAACETIC (EDTA) Left 3/17/2015    Procedure: SCRUB ETHYLENEDIAMENETETRAACETIC (EDTA);  Surgeon: Aamir Taylor MD;  [facility]     SINUS SURGERY  3/2011           Family History:     Family History   Problem Relation Age of Onset     Diabetes Paternal Grandfather             Social History:     Social History     Socioeconomic History     Marital status: Single      Spouse name: Not on file     Number of children: Not on file     Years of education: Not on file     Highest education level: Not on file   Occupational History     Not on file   Social Needs     Financial resource strain: Not on file     Food insecurity     Worry: Not on file     Inability: Not on file     Transportation needs     Medical: Not on file     Non-medical: Not on file   Tobacco Use     Smoking status: Never Smoker     Smokeless tobacco: Never Used   Substance and Sexual Activity     Alcohol use: No     Drug use: No     Sexual activity: Not on file   Lifestyle     Physical activity     Days per week: Not on file     Minutes per session: Not on file     Stress: Not on file   Relationships     Social connections     Talks on phone: Not on file     Gets together: Not on file     Attends Bahai service: Not on file     Active member of club or organization: Not on file     Attends meetings of clubs or organizations: Not on file     Relationship status: Not on file     Intimate partner violence     Fear of current or ex partner: Not on file     Emotionally abused: Not on file     Physically abused: Not on file     Forced sexual activity: Not on file   Other Topics Concern     Parent/sibling w/ CABG, MI or angioplasty before 65F 55M? Not Asked   Social History Narrative    Mom is 35, Dad 39, both healthy.            Medications:     Current Outpatient Medications   Medication     albuterol (VENTOLIN HFA) 108 (90 Base) MCG/ACT inhaler     amylase-lipase-protease (CREON) 27771-98102 units CPEP per EC capsule     azithromycin (ZITHROMAX) 500 MG tablet     aztreonam lysine (CAYSTON) 75 MG SOLR     beclomethasone (QVAR) 80 MCG/ACT Inhaler     blood glucose (ACCU-CHEK GUIDE) test strip     blood glucose monitoring (ACCU-CHEK FASTCLIX) lancets     dornase alpha (PULMOZYME) 1 MG/ML neb solution     elexacaftor-tezacaftor-ivacaftor & ivacaftor (TRIKAFTA) 100-50-75 & 150 MG tablet pack     ipratropium - albuterol  0.5 mg/2.5 mg/3 mL (DUONEB) 0.5-2.5 (3) MG/3ML neb solution     mvw complete formulation (SOFTGELS ) capsule     Nutritional Supplements (NUTREN 2.0)     JOSE ALTERA NEBULIZER SYSTEM MISC     polyethylene glycol (MIRALAX) powder     Respiratory Therapy Supplies (JOSE ALTERA NEBULIZER HANDSET) MISC     sodium chloride inhalant (HYPERSAL) 7 % NEBU neb solution     tobramycin, PF, (NAMRATA) 300 MG/5ML neb solution     Current Facility-Administered Medications   Medication     albuterol (PROVENTIL) neb solution 2.5 mg     aztreonam (AZACTAM) 1 g            Physical Exam:   /78   Pulse 91   Resp 18   Wt 70.2 kg (154 lb 12.2 oz)   SpO2 99%   BMI 23.53 kg/m      GENERAL: alert, NAD  HEENT: NCAT, EOMI, anicteric sclera, no nasal edema or erythema; canals and TMs clear; no oral mucosal edema or erythema  Neck: no cervical or supraclavicular adenopathy  Respiratory: good air flow, no crackles, rhonchi or wheezing  CV: RRR, S1S2, no murmurs noted  Abdomen: normoactive BS, soft and non tender   Lymph: no edema, + digital clubbing  Neuro: AAO X 3, CN 2-12 grossly intact, 5/5 bilateral  strength and dorsiflexion  Psychiatric: normal affect, good eye contact  Skin: no rash, jaundice or lesions on limited exam         Data:   All laboratory and imaging data reviewed.      Cystic Fibrosis Culture  Specimen Description   Date Value Ref Range Status   02/03/2020 Throat  Final   02/03/2020 Throat  Final   11/04/2019 Throat  Final    Culture Micro   Date Value Ref Range Status   02/03/2020 Canceled, Test credited  Incorrect specimen type    Final   02/03/2020   Final    Notification of test cancellation was given to  Mindy in [facility]. They do not want a yeast culture instead. 2.3.20 at 1109 bw     02/03/2020 Moderate growth  Normal branden    Final   02/03/2020 (A)  Final    Moderate growth  Pseudomonas aeruginosa, mucoid strain     02/03/2020 Light growth  Pseudomonas aeruginosa   (A)  Final        PFT  interpretation:  Maneuver: valid and meets ATS guidelines  Normal spirometry          Again, thank you for allowing me to participate in the care of your patient.        Sincerely,        Nita Cedeño PA-C

## 2020-12-08 LAB
DEPRECATED CALCIDIOL+CALCIFEROL SERPL-MC: 19 UG/L (ref 20–75)
IGG SERPL-MCNC: 979 MG/DL (ref 610–1616)

## 2020-12-09 LAB
A-TOCOPHEROL VIT E SERPL-MCNC: 7.5 MG/L (ref 5.5–18)
ANNOTATION COMMENT IMP: NORMAL
BETA+GAMMA TOCOPHEROL SERPL-MCNC: 0.9 MG/L (ref 0–6)
IGE SERPL-ACNC: 28 KIU/L (ref 0–114)
RETINYL PALMITATE SERPL-MCNC: <0.02 MG/L (ref 0–0.1)
TESTOST SERPL-MCNC: 402 NG/DL (ref 240–950)
VIT A SERPL-MCNC: 0.93 MG/L (ref 0.3–1.2)

## 2020-12-12 LAB
BACTERIA SPEC CULT: NORMAL
Lab: NORMAL
SPECIMEN SOURCE: NORMAL

## 2020-12-23 NOTE — CR
Fingers Thumb Rt F5

 

CLINICAL HISTORY: Trauma

 

FINDINGS: There is soft tissue disruption at the distal thumb. No underlying

fracture is identified and there is no foreign body

 

Impression: Soft tissue laceration

 

No fracture

## 2020-12-23 NOTE — EDM.PDOC
ED HPI GENERAL MEDICAL PROBLEM





- General


Chief Complaint: Upper Extremity Injury/Pain


Stated Complaint: smashed thumb on r hand


Time Seen by Provider: 12/23/20 13:38


Source of Information: Reports: Patient, Family, RN Notes Reviewed


History Limitations: Reports: No Limitations





- History of Present Illness


INITIAL COMMENTS - FREE TEXT/NARRATIVE: 





20-year-old gentleman presents emergency department today after smashing his 

right thumb with a hammer he has no functional complaints end up getting just 

the distal aspect where the nail is





- Related Data


                                    Allergies











Allergy/AdvReac Type Severity Reaction Status Date / Time


 


amoxicillin Allergy Unknown Rash Verified 07/26/16 06:04


 


Penicillins Allergy Unknown Rash Verified 07/26/16 06:04


 


Sulfa (Sulfonamide Allergy  Rash Verified 07/26/16 07:58





Antibiotics)     


 


vancomycin Allergy  Rash Verified 06/16/18 04:01











Home Meds: 


                                    Home Meds





Albuterol [Proventil Neb Soln] 1 vial INH QID 12/18/14 [History]


Azithromycin [Zithromax] 1 tab PO .MWF 12/18/14 [History]


Budesonide [Pulmicort Flexhaler] 2 puff INH BID 12/18/14 [History]


Dornase Enzo [Pulmozyme] 2.5 mg INH BID 12/18/14 [History]


Pedi Multivit #64/Vit D3/Vit K [Choiceful Vitamins Chew Tablet] 2 tab PO DAILY 

12/18/14 [History]


Sodium Chloride for Inhalation [Hyper-Sal] 4 ml PO BID 12/18/14 [History]


Cholecalciferol (Vitamin D3) [Vitamin D3] 1,000 unit PO TID 07/22/16 [History]


Lumacaftor/Ivacaftor [Orkambi 200 mg-125 mg Tablet] 1 tab PO BID 07/22/16 

[History]


polyethylene glycoL 3350 [Miralax] 1 tsp PO DAILY PRN 07/22/16 [History]


Amylase/Lipase/Protease [Creon DR 24,000 Unit] 5 - 6 cap PO TID 07/26/16 

[History]


Ipratropium [Atrovent] 0.5 mg INH TID 07/26/16 [History]











Past Medical History


HEENT History: Reports: Cataract, Impaired Vision, Sinusitis


Respiratory History: Reports: Asthma, Cystic Fibrosis, SOB


Gastrointestinal History: Reports: Chronic Constipation, GERD





- Past Surgical History


HEENT Surgical History: Reports: Adenoidectomy, Cataract Surgery, Eye Surgery, 

Naso-Sinus Surgery, Polypectomy, Retinal, Tonsillectomy, Other (See Below)


Other HEENT Surgeries/Procedures: left eye fishhook injury, nasal polyps removed


Respiratory Surgical History: Reports: None


GI Surgical History: Reports: Hernia, Inguinal





Social & Family History





- Tobacco Use


Tobacco Use Status *Q: Never Tobacco User





- Caffeine Use


Caffeine Use: Reports: Soda





- Recreational Drug Use


Recreational Drug Use: No





Review of Systems





- Review of Systems


Review Of Systems: See Below


Musculoskeletal: Reports: Hand Pain


Skin: Reports: Wound


Neurological: Reports: No Symptoms





ED EXAM, GENERAL





- Physical Exam


Exam: See Below


Free Text/Narrative:: 





Examination of the thumb right hand the nail still is intact a portion of it has

 been pulled away from the cuticle and there is a portion of subungual tissue 

which has protruded out underneath the nail there is a large subungual hematoma 

present, radial pulses +2 full range of motion of all digits


Exam Limited By: No Limitations


General Appearance: Alert, WD/WN, No Apparent Distress





ED TRAUMA EXTREMITY PROCEDURES





- Laceration/Wound Repair


  ** Right Digit - 1st (Thumb)


Lac/Wound Length In cm: 1


Appearance: Subcutaneous, Irregular


Distal NVT: Neuro & Vascular Intact, No Tendon Injury


Anesthetic Type: Digital


Local Anesthesia - Lidocaine (Xylocaine): 1% with EPI


Local Anesthetic Volume: 2cc


Skin Prep: Saline


Saline Irrigation (cc's): 150


Exploration/Debridement/Repair: Wound Explored, In a Bloodless Field, Explored 

to Base


Closed With: Sutures


Suture Size: 4-0


# of Sutures: 3


Suture Type: Prolene


Drain Placement: No


Tetanus Status Addressed: Yes





Course





- Vital Signs


Last Recorded V/S: 


                                Last Vital Signs











Temp  97.7 F   12/23/20 13:09


 


Pulse  49 L  12/23/20 13:09


 


Resp  16   12/23/20 13:09


 


BP  80/34 L  12/23/20 13:09


 


Pulse Ox  97   12/23/20 13:09














- Orders/Labs/Meds


Meds: 


Medications














Discontinued Medications














Generic Name Dose Route Start Last Admin





  Trade Name Freq  PRN Reason Stop Dose Admin


 


Bacitracin  1 dose  12/23/20 13:41  12/23/20 13:55





  Bacitracin Oint 1 Gm  TOP  12/23/20 13:42  1 dose





  ONETIME ONE   Administration


 


Lidocaine HCl  5 ml  12/23/20 13:41  12/23/20 13:56





  Xylocaine-Mpf 1%  INJECT  12/23/20 13:42  5 ml





  ONETIME ONE   Administration














Departure





- Departure


Time of Disposition: 14:57


Disposition: Home, Self-Care 01


Condition: Fair


Clinical Impression: 


Laceration of thumb, right


Qualifiers:


 Encounter type: initial encounter Damage to nail status: with damage Foreign 

body presence: without foreign body Qualified Code(s): S61.111A - Laceration 

without foreign body of right thumb with damage to nail, initial encounter








- Discharge Information


Instructions:  Sutured Wound Care, Easy-to-Read


Referrals: 


Xuan Shen PA [Primary Care Provider] - 


Forms:  ED Department Discharge


Additional Instructions: 


Suture removal in 10 days, follow wound care instruction sheet, return to the 

emergency department or follow-up with primary care for suture removal





Sepsis Event Note (ED)





- Evaluation


Sepsis Screening Result: No Definite Risk





- Focused Exam


Vital Signs: 


                                   Vital Signs











  Temp Pulse Resp BP Pulse Ox


 


 12/23/20 13:09  97.7 F  49 L  16  80/34 L  97














- Assessment/Plan


Plan: 





Assessment





Acuity = acute





Site and laterality = subungual hematoma distal laceration right thumb





Etiology  = secondary trauma





Manifestations = none





Location of injury =  Home





Lab values = x-ray reveals no fracture





Plan


Suture removal in 10 days follow wound care instruction sheet follow-up with 

primary care return to the emergency department for suture removal

















 This note was dictated using dragon voice recognition software please call with

any questions on syntax or grammar.

## 2021-01-25 DIAGNOSIS — E84.9 CF (CYSTIC FIBROSIS) (H): ICD-10-CM

## 2021-01-25 RX ORDER — IPRATROPIUM BROMIDE AND ALBUTEROL SULFATE 2.5; .5 MG/3ML; MG/3ML
1 SOLUTION RESPIRATORY (INHALATION) 3 TIMES DAILY
Qty: 360 ML | Refills: 3 | Status: SHIPPED | OUTPATIENT
Start: 2021-01-25 | End: 2022-02-07

## 2021-02-04 DIAGNOSIS — E84.9 CF (CYSTIC FIBROSIS) (H): ICD-10-CM

## 2021-02-08 DIAGNOSIS — E84.9 CF (CYSTIC FIBROSIS) (H): ICD-10-CM

## 2021-02-08 RX ORDER — TOBRAMYCIN INHALATION SOLUTION 300 MG/5ML
300 INHALANT RESPIRATORY (INHALATION) 2 TIMES DAILY
Qty: 280 ML | Refills: 6 | Status: SHIPPED | OUTPATIENT
Start: 2021-02-08 | End: 2021-08-27

## 2021-02-18 ENCOUNTER — CLINICAL UPDATE (OUTPATIENT)
Dept: PHARMACY | Facility: CLINIC | Age: 21
End: 2021-02-18
Payer: COMMERCIAL

## 2021-02-18 DIAGNOSIS — E84.9 CF (CYSTIC FIBROSIS) (H): Primary | ICD-10-CM

## 2021-02-18 PROCEDURE — 99207 PR NO CHARGE LOS: CPT | Performed by: PHARMACIST

## 2021-02-18 NOTE — PROGRESS NOTES
Clinical Update:                                                      Reason for Chart Review: Possible medication adherence concern    Discussion: Notified by [facility] of possible non-compliance or medication underuse of Pulmozyme, based on patient's refill history.    Pulmozyme dispense dates (30 day supply):  2/9/21  12/22/20  11/20/20  10/16/20  8/21/20    Plan:  Will follow-up with patient at his next CF clinic appointment on 3/11/21.

## 2021-02-22 DIAGNOSIS — E84.0 CYSTIC FIBROSIS WITH PULMONARY MANIFESTATIONS (H): ICD-10-CM

## 2021-02-22 RX ORDER — SODIUM CHLORIDE FOR INHALATION 7 %
4 VIAL, NEBULIZER (ML) INHALATION 2 TIMES DAILY
Qty: 240 ML | Refills: 11 | Status: SHIPPED | OUTPATIENT
Start: 2021-02-22 | End: 2022-02-08

## 2021-03-16 DIAGNOSIS — E84.9 CYSTIC FIBROSIS (H): ICD-10-CM

## 2021-03-16 RX ORDER — ELEXACAFTOR, TEZACAFTOR, AND IVACAFTOR 100-50-75
KIT ORAL
Qty: 84 TABLET | Refills: 2 | Status: SHIPPED | OUTPATIENT
Start: 2021-03-16 | End: 2021-07-15

## 2021-03-23 ENCOUNTER — TELEPHONE (OUTPATIENT)
Dept: PULMONOLOGY | Facility: CLINIC | Age: 21
End: 2021-03-23

## 2021-03-23 NOTE — TELEPHONE ENCOUNTER
Prior Authorization Approval    Authorization Effective Date: 3/23/2021  Authorization Expiration Date: 3/23/2022  Medication: Trikafta 100-50-75 &150 MG Tablets   Approved Dose/Quantity: 84  Reference #: [id number]   Insurance Company: [employer] ([facility]) - Phone [phone] Fax [phone]  Which Pharmacy is filling the prescription (Not needed for infusion/clinic administered): [facility] - [place] [address]   Pharmacy Notified: Yes  Patient Notified: Yes

## 2021-03-23 NOTE — TELEPHONE ENCOUNTER
Prior Authorization Retail Medication Request    Medication/Dose: Trikafta 100-50-75 &150 MG Tablets   ICD code (if different than what is on RX):    Previously Tried and Failed:    Rationale:      Insurance Name:    Insurance ID:        Pharmacy Information (if different than what is on RX)  Name:  [facility]  Phone:  [phone]

## 2021-03-23 NOTE — TELEPHONE ENCOUNTER
PA Initiation    Medication: Trikafta 100-50-75 &150 MG Tablets   Insurance Company: [employer] ([facility]) - Phone [phone] Fax [phone]  Pharmacy Filling the Rx: [facility] - [place], [place] - [address]   Filling Pharmacy Phone: [phone]  Filling Pharmacy Fax:    Start Date: 3/23/2021    Central Prior Authorization Team   Phone: [phone]

## 2021-03-31 NOTE — PROGRESS NOTES
[facility]  April 5, 2021         Assessment and Plan:   Keon Conway is a 20 year old male with cystic fibrosis who is seen today in clinic for routine follow up. He recovered from COVID in mid October.     1. CF lung disease: no new pulmonary complaints, intermittent dry cough. 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 Sating 99% on room air; vesting BID. PFTs down slightly from prior, but still at her recent baseline. 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 No evidence of exacerbation.  - Continue current nebulizers and vest therapy  - Alternating [place] and noemi nebs  - Chronic azithromycin 500 mg three times/week     2. 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 Labs today WNL.   - Labs in 6 months  - Continue Trikafta     3. Reactive hypoglycemia: with normal AIC and fasting BS, but 2 hour BS of 36 on OGTT last year and patient completely asymptomatic. AIC during last annuals of 5.1, did not do full OGTT.  - OGTT with annuals this year     4. Exocrine pancreatic insufficiency: with h/o malnutrition s/p G tube placement. Notes G tube fell out a few months ago, unable to replace. Has been able to maintain his weight with BMI > CFF goal. BHARATH Restrepo, to call today.  - Continue pancreatic enzymes and vitamin supplementation     5. Hypertriglyceridemia: elevated since 2005, level today was fasting and remains elevated.   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 Feeling well, no shortness of breath. Cough is dry and intermittent. No tightness. Vesting BID. No nausea or bloating, feeding tube fell out one night back in  1-2 months ago. Unable to replace and doing well. Having about 1 stool per day, not fatty or floating.          Review of Systems:   Please see HPI. Otherwise, complete 10 point ROS negative.           Past Medical and Surgical History:     Past Medical History:   Diagnosis Date     CF (cystic fibrosis) (H) 11/30/2011     Chronic maxillary sinusitis 11/30/2011     Exocrine pancreatic insufficiency 11/30/2011     Past Surgical History:   Procedure Laterality Date     BRONCHOSCOPY (RIGID OR FLEXIBLE), DIAGNOSTIC N/A 10/2/2017    Procedure: COMBINED BRONCHOSCOPY (RIGID OR FLEXIBLE), LAVAGE;  Flexible Bronchoscopy With Lavage, PICC Line Placement ;  Surgeon: Darrel Dudley MD;  [facility]     CATARACT IOL, RT/LT Left      EXAM UNDER ANESTHESIA EYE(S) Left 3/17/2015    Procedure: EXAM UNDER ANESTHESIA EYE(S);  Surgeon: Aamir Taylor MD;  [facility]     HERNIA REPAIR  December 2014     INSERT PICC LINE Right 10/2/2017    Procedure: INSERT PICC LINE;;  Surgeon: Angeles Singh MD;  [facility]     LAPAROSCOPIC ASSISTED INSERTION TUBE GASTROTOMY N/A 10/16/2017    Procedure: LAPAROSCOPIC ASSISTED INSERTION TUBE GASTROSTOMY;  Laparoscopic Gastrostomy Tube Placement.;  Surgeon: Jeremy Obando MD;  [facility]     SCRUB ETHYLENEDIAMENETETRAACETIC (EDTA) Left 3/17/2015    Procedure: SCRUB ETHYLENEDIAMENETETRAACETIC (EDTA);  Surgeon: Aamir Taylor MD;  [facility]     SINUS SURGERY  3/2011           Family History:     Family History   Problem Relation Age of Onset     Diabetes Paternal Grandfather             Social History:     Social History     Socioeconomic History     Marital status: Single     Spouse name: Not on file     Number of children: Not on file     Years of education: Not on file     Highest education level: Not on file   Occupational History     Not on file   Social Needs     Financial resource strain: Not on file     Food insecurity     Worry: Not on file      Inability: Not on file     Transportation needs     Medical: Not on file     Non-medical: Not on file   Tobacco Use     Smoking status: Never Smoker     Smokeless tobacco: Never Used   Substance and Sexual Activity     Alcohol use: No     Drug use: No     Sexual activity: Not on file   Lifestyle     Physical activity     Days per week: Not on file     Minutes per session: Not on file     Stress: Not on file   Relationships     Social connections     Talks on phone: Not on file     Gets together: Not on file     Attends Cheondoism service: Not on file     Active member of club or organization: Not on file     Attends meetings of clubs or organizations: Not on file     Relationship status: Not on file     Intimate partner violence     Fear of current or ex partner: Not on file     Emotionally abused: Not on file     Physically abused: Not on file     Forced sexual activity: Not on file   Other Topics Concern     Parent/sibling w/ CABG, MI or angioplasty before 65F 55M? Not Asked   Social History Narrative    Mom is 35, Dad 39, both healthy.            Medications:     Current Outpatient Medications   Medication     albuterol (VENTOLIN HFA) 108 (90 Base) MCG/ACT inhaler     amylase-lipase-protease (CREON) 50790-31732 units CPEP per EC capsule     azithromycin (ZITHROMAX) 500 MG tablet     aztreonam lysine (CAYSTON) 75 MG SOLR     blood glucose (ACCU-CHEK GUIDE) test strip     blood glucose monitoring (ACCU-CHEK FASTCLIX) lancets     dornase alpha (PULMOZYME) 1 MG/ML neb solution     elexacaftor-tezacaftor-ivacaftor & ivacaftor (TRIKAFTA) 100-50-75 & 150 MG tablet pack     ipratropium - albuterol 0.5 mg/2.5 mg/3 mL (DUONEB) 0.5-2.5 (3) MG/3ML neb solution     mvw complete formulation (SOFTGELS ) capsule     JOSE ALTERA NEBULIZER SYSTEM MISC     polyethylene glycol (MIRALAX) powder     Respiratory Therapy Supplies (JOSE ALTERA NEBULIZER HANDSET) MISC     sodium chloride inhalant (HYPERSAL) 7 % NEBU neb solution      "tobramycin, PF, (NAMRATA) 300 MG/5ML neb solution     Current Facility-Administered Medications   Medication     albuterol (PROVENTIL) neb solution 2.5 mg     aztreonam (AZACTAM) 1 g            Physical Exam:   /83   Pulse 85   Resp 17   Ht 1.727 m (5' 8\")   Wt 69.6 kg (153 lb 7 oz)   SpO2 99%   BMI 23.33 kg/m      GENERAL: alert, NAD  HEENT: NCAT, EOMI, anicteric sclera, drainage in bilateral nares; canals and TMs clear; no oral mucosal edema or erythema  Neck: no cervical or supraclavicular adenopathy  Respiratory: good air flow, no crackles, rhonchi or wheezing  CV: RRR, S1S2, no murmurs noted  Abdomen: normoactive BS, soft and non tender   Lymph: no edema, + digital clubbing  Neuro: AAO X 3, CN 2-12 grossly intact  Psychiatric: normal affect, good eye contact  Skin: no rash, jaundice or lesions on limited exam         Data:   All laboratory and imaging data reviewed.      Cystic Fibrosis Culture  Specimen Description   Date Value Ref Range Status   12/07/2020 Throat  Final   02/03/2020 Throat  Final   02/03/2020 Throat  Final    Culture Micro   Date Value Ref Range Status   12/07/2020 Moderate growth  Normal branden    Final   02/03/2020 Canceled, Test credited  Incorrect specimen type    Final   02/03/2020   Final    Notification of test cancellation was given to  Mindy in [facility]. They do not want a yeast culture instead. 2.3.20 at 1109 bw          PFT interpretation:  Maneuver: valid and meets ATS guidelines  Normal spirometry      "

## 2021-04-05 ENCOUNTER — OFFICE VISIT (OUTPATIENT)
Dept: PULMONOLOGY | Facility: CLINIC | Age: 21
End: 2021-04-05
Attending: PHYSICIAN ASSISTANT
Payer: COMMERCIAL

## 2021-04-05 ENCOUNTER — ALLIED HEALTH/NURSE VISIT (OUTPATIENT)
Dept: CARE COORDINATION | Facility: CLINIC | Age: 21
End: 2021-04-05
Payer: COMMERCIAL

## 2021-04-05 VITALS
RESPIRATION RATE: 17 BRPM | HEIGHT: 68 IN | WEIGHT: 153.44 LBS | OXYGEN SATURATION: 99 % | BODY MASS INDEX: 23.26 KG/M2 | SYSTOLIC BLOOD PRESSURE: 137 MMHG | HEART RATE: 85 BPM | DIASTOLIC BLOOD PRESSURE: 83 MMHG

## 2021-04-05 DIAGNOSIS — E84.9 CF (CYSTIC FIBROSIS) (H): Primary | ICD-10-CM

## 2021-04-05 DIAGNOSIS — Z13.9 RISK AND FUNCTIONAL ASSESSMENT: Primary | ICD-10-CM

## 2021-04-05 DIAGNOSIS — E84.9 CF (CYSTIC FIBROSIS) (H): ICD-10-CM

## 2021-04-05 LAB
ALBUMIN SERPL-MCNC: 4.3 G/DL (ref 3.4–5)
ALP SERPL-CCNC: 142 U/L (ref 40–150)
ALT SERPL W P-5'-P-CCNC: 35 U/L (ref 0–70)
AST SERPL W P-5'-P-CCNC: 18 U/L (ref 0–45)
BILIRUB DIRECT SERPL-MCNC: 0.2 MG/DL (ref 0–0.2)
BILIRUB SERPL-MCNC: 0.7 MG/DL (ref 0.2–1.3)
CHOLEST SERPL-MCNC: 132 MG/DL
CK SERPL-CCNC: 184 U/L (ref 30–300)
EXPTIME-PRE: 6.58 SEC
FEF2575-%PRED-PRE: 76 %
FEF2575-PRE: 3.65 L/SEC
FEF2575-PRED: 4.8 L/SEC
FEFMAX-%PRED-PRE: 104 %
FEFMAX-PRE: 10.08 L/SEC
FEFMAX-PRED: 9.66 L/SEC
FEV1-%PRED-PRE: 92 %
FEV1-PRE: 4.04 L
FEV1FEV6-PRE: 80 %
FEV1FEV6-PRED: 84 %
FEV1FVC-PRE: 80 %
FEV1FVC-PRED: 86 %
FIFMAX-PRE: 7.98 L/SEC
FVC-%PRED-PRE: 98 %
FVC-PRE: 5.05 L
FVC-PRED: 5.15 L
HDLC SERPL-MCNC: 51 MG/DL
LDLC SERPL CALC-MCNC: 36 MG/DL
NONHDLC SERPL-MCNC: 81 MG/DL
PROT SERPL-MCNC: 7.8 G/DL (ref 6.8–8.8)
TRIGL SERPL-MCNC: 223 MG/DL
TSH SERPL DL<=0.005 MIU/L-ACNC: 3.51 MU/L (ref 0.4–4)

## 2021-04-05 PROCEDURE — 84443 ASSAY THYROID STIM HORMONE: CPT | Performed by: PATHOLOGY

## 2021-04-05 PROCEDURE — 94375 RESPIRATORY FLOW VOLUME LOOP: CPT | Performed by: PHYSICIAN ASSISTANT

## 2021-04-05 PROCEDURE — 87070 CULTURE OTHR SPECIMN AEROBIC: CPT | Performed by: PHYSICIAN ASSISTANT

## 2021-04-05 PROCEDURE — 82550 ASSAY OF CK (CPK): CPT | Performed by: PATHOLOGY

## 2021-04-05 PROCEDURE — 80076 HEPATIC FUNCTION PANEL: CPT | Performed by: PATHOLOGY

## 2021-04-05 PROCEDURE — 99214 OFFICE O/P EST MOD 30 MIN: CPT | Mod: 25 | Performed by: PHYSICIAN ASSISTANT

## 2021-04-05 PROCEDURE — 36415 COLL VENOUS BLD VENIPUNCTURE: CPT | Performed by: PATHOLOGY

## 2021-04-05 PROCEDURE — 80061 LIPID PANEL: CPT | Performed by: PATHOLOGY

## 2021-04-05 PROCEDURE — G0463 HOSPITAL OUTPT CLINIC VISIT: HCPCS | Mod: 25

## 2021-04-05 ASSESSMENT — MIFFLIN-ST. JEOR: SCORE: 1675.5

## 2021-04-05 ASSESSMENT — ANXIETY QUESTIONNAIRES
6. BECOMING EASILY ANNOYED OR IRRITABLE: NOT AT ALL
7. FEELING AFRAID AS IF SOMETHING AWFUL MIGHT HAPPEN: NOT AT ALL
1. FEELING NERVOUS, ANXIOUS, OR ON EDGE: NOT AT ALL
5. BEING SO RESTLESS THAT IT IS HARD TO SIT STILL: NOT AT ALL
2. NOT BEING ABLE TO STOP OR CONTROL WORRYING: NOT AT ALL
GAD7 TOTAL SCORE: 0
3. WORRYING TOO MUCH ABOUT DIFFERENT THINGS: NOT AT ALL

## 2021-04-05 ASSESSMENT — PAIN SCALES - GENERAL: PAINLEVEL: NO PAIN (0)

## 2021-04-05 ASSESSMENT — PATIENT HEALTH QUESTIONNAIRE - PHQ9
5. POOR APPETITE OR OVEREATING: NOT AT ALL
SUM OF ALL RESPONSES TO PHQ QUESTIONS 1-9: 0

## 2021-04-05 NOTE — PATIENT INSTRUCTIONS
Cystic Fibrosis Self-Care Plan       Patient: Keon Conway   MRN: [id number]   Clinic Date: April 5, 2021     RECOMMENDATIONS:  1. Continue nebulizers and vest therapy.   2. Make a covid vaccine appointment via My Chart.   3. Kaye, dietician, will call you later today.     Annual Studies:   IGG   Date Value Ref Range Status   12/07/2020 979 610 - 1,616 mg/dL Final     Insulin   Date Value Ref Range Status   05/08/2019 5.7 3 - 25 mU/L Final     There are no preventive care reminders to display for this patient.    Pulmonary Function Tests  FEV1: amount of air you can blow out in 1 second  FVC: total amount of air you can take in and blow out    Your Goals:         PFT Latest Ref Rng & Units 4/5/2021   FVC L 5.05   FEV1 L 4.04   FVC% % 98   FEV1% % 92          Airway Clearance: The Most Important Way to Keep Your Lungs Healthy  Vest Settings:    Hill-Rom Frequencies: 8, 9, 10 Pressure 10 Then, Frequencies 18, 19, 20 Pressure 6      RespirTech: Quick Start with Pressure of     Do each frequency for 5 minutes; Deflate vest after each frequency & cough 3 times before beginning the next setting.    Vest and Neb Therapy should be done 2 times/day.    Good Nutrition Can Improve Lung Function and Overall Health     Take ALL of your vitamins with food     Take 1/2 of your enzymes before EVERY meal/snack and the other 1/2 mid-meal/snack    Wt Readings from Last 3 Encounters:   04/05/21 69.6 kg (153 lb 7 oz)   12/07/20 70.2 kg (154 lb 12.2 oz)   02/03/20 71 kg (156 lb 8.4 oz)       Body mass index is 23.33 kg/m .         National CF Foundation Recommendations for BMI in CF Adults: Women: at least 22 Men: at least 23        Controlling Blood Sugars Helps Prevent Lung Infections & Improves Nutrition  Test blood sugar:     In the morning before eating (goal is )     2 hours after a meal (goal is less than 150)     When pre-meal glucose is greater than 150 add correction     At bedtime (if less than 100 eat a snack  with 15 grams of carbohydrates  Last A1C Results:   Hemoglobin A1C   Date Value Ref Range Status   12/07/2020 5.1 0 - 5.6 % Final     Comment:     Normal <5.7% Prediabetes 5.7-6.4%  Diabetes 6.5% or higher - adopted from ADA   consensus guidelines.           If diabetic, measure A1C every 6 months. Goal is under 7%.    Staying Healthy    Research: If you are interested in learning about research opportunities or have questions, please contact Lily Daniels at [phone] or [email or web address].       Foundation: Compass is a personalized resource service to help you with the insurance, financial, legal and other issues you are facing.  It's free, confidential and available to anyone with CF.  Ask your  for more information or contact Compass directly at [facility] ([phone]) or compass@cff.org, or learn more at cff.org/compass.       CF Nurse Line: Rodney Ken: [phone]   Marcelina Sales, RT: [phone]     Kaye Mendes , Dieticians: [phone]     Mindy Pedro, Diabetes Nurse: [phone]    Felicia Garcia: [phone] or Lakisha Lay at [phone], Social Workers   [email or web address]

## 2021-04-05 NOTE — NURSING NOTE
Chief Complaint   Patient presents with     RECHECK     Retrun CF     Medications reviewed and vital signs taken.   Lucila Iniguez, CMA

## 2021-04-05 NOTE — PROGRESS NOTES
"Adult Cystic Fibrosis Program  Annual Psychosocial Assessment    Presenting Information:  KEON is an 21-year-old male with cystic fibrosis, presenting in CF clinic for a follow up with CF provider, Nita Rivas.  Met with Keon for annual psychosocial assessment.      Living situation:  Keon lives with his parents and brother in [place], [place].  His parents own the home. They have 2 dogs. He does not currently pay rent. He denies any related concerns. Keon hopes to move out on his own sometime soon and has been looking for a house to buy.     Family Constellation:  Keon was raised by his biological parents.  He has 1 sibling(s): an older brother.  He notes that all four of his grandparents are still living. His extended family all lives close by. He is not aware of anyone else in his family having CF.     Social Support:  Keon reports good social support.  He gets along well with family members and draws additional support from his girlfriend (Miri) and friends. He does not have any connections in the CF Community at this time.     Adjustment to Illness:  Keon was diagnosed with CF at age 18 months. He reports being somewhat sick often during his childhood with some hospitalizations.     Today he describes his current health status as \"pretty good\". He started trikafta about a year ago and has been coughing less and feels it has been easier to breathe since then. Clinically, he has mild lung disease and pancreatic insufficiency. He typically does 2 vest treatment(s) per day. He does not exercise regularly at this time but notes being constantly on the move at work. He denies any health problems that interfere with activities of daily living.      Keon is open about his CF diagnosis.          Health Care Directive:  Keon has previously received Health Care Directive education. This SW provided overview of education, including concept/purpose of health care directive, default health care agents " and how to complete a directive. Keon is comfortable with his default health care agent(s) (parents) in absence of a directive. He is not currently interested in reviewing a health care directive.     Education:  Keon has completed high school. He does not currently have plans for further education. He is aware that scholarships are available if he changes his mind.     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 Starting in the Spring and through November he works about 60-70 hours a week. He collects unemployment in the winter since he gets laid off from the farm. 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 He denies related employment concerns at this time.      Finances:  Keon receives income from wages and also relies on parental support. He does not currently pay rent, for food or medical needs/expenses. He denies any financial concerns at this time.     Insurance:  Keon is insured through his mother's employer policy as well as [employer] ([employer]). He denies any related concerns at this time. He was encouraged to call this SW to report changes/questions/concerns/needs should they arise.     Mental Health/Coping:  Keon denies any current or past symptoms indicative of mood, anxiety, eating, learning or other mental health disorder. He reports a positive and stable mood recently.    Keon was administered the following screens today in clinic:  RADHA-7: score of 0, indicating an absence of anxiety symptoms.   PHQ-9: score of 0, indicating an absence of depression symptoms.     He does not take any medications for mental health and does not see a therapist. Keon reports low/manageable stress levels. He was not able to identify any particular coping skills today.      Keon does not identify elizabeth/spirituality  "as important in his life and goes to Mandaeism \"off and on\".     Chemical Health:  Keon denies the use of alcohol, tobacco or other drugs.     Leisure Activities/Interests:   Keon enjoys going fishing, ice fishing, hanging out with friends, and snowmobiling.     Intervention:  -Psychosocial Assessment  -Supportive counseling  -Insurance/financial counseling    Assessment:  Keon was pleasant and receptive to SW visit. He appeared to be open in his responses. He continues to work long hours on a farm near his home during the spring and summer but is still able to balance his CF cares. He denies any past or current mental health concerns. He continues to be well supported by his family.     Keon seems to be psychosocially stable overall, with access to relevant resources and supports.  No concerns expressed/noted.    Plan:  Re-consult for any psychosocial needs that may arise. Complete psychosocial assessment annually.  Continue to follow for regular clinic consult.    JAE Katz, [facility]  Adult Cystic Fibrosis   Ph: [phone], Pager: [phone]            "

## 2021-04-05 NOTE — LETTER
4/5/2021         RE: Keon Conway  [address]  [facility] [address]        Dear Colleague,    Thank you for referring your patient, Keon Conway, to the [facility] [place]. Please see a copy of my visit note below.    [facility]  April 5, 2021         Assessment and Plan:   Keon Conway is a 20 year old male with cystic fibrosis who is seen today in clinic for routine follow up. He recovered from COVID in mid October.     1. CF lung disease: no new pulmonary complaints, intermittent dry cough. 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 Sating 99% on room air; vesting BID. PFTs down slightly from prior, but still at her recent baseline. 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 No evidence of exacerbation.  - Continue current nebulizers and vest therapy  - Alternating [place] and noemi nebs  - Chronic azithromycin 500 mg three times/week     2. 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 Labs today WNL.   - Labs in 6 months  - Continue Trikafta     3. Reactive hypoglycemia: with normal AIC and fasting BS, but 2 hour BS of 36 on OGTT last year and patient completely asymptomatic. AIC during last annuals of 5.1, did not do full OGTT.  - OGTT with annuals this year     4. Exocrine pancreatic insufficiency: with h/o malnutrition s/p G tube placement. Notes G tube fell out a few months ago, unable to replace. Has been able to maintain his weight with BMI > CFF goal. BHARATH Restrepo, to call today.  - Continue pancreatic enzymes and vitamin supplementation     5. Hypertriglyceridemia: elevated since 2005, level today was fasting and remains elevated.   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 Feeling well, no shortness of breath. Cough is dry and intermittent. No tightness. Vesting BID. No nausea or bloating, feeding tube fell out one night back in 1-2 months ago. Unable to replace and doing well. Having about 1 stool per day, not fatty or floating.          Review of Systems:   Please see HPI. Otherwise, complete 10 point ROS negative.           Past Medical and Surgical History:     Past Medical History:   Diagnosis Date     CF (cystic fibrosis) (H) 11/30/2011     Chronic maxillary sinusitis 11/30/2011     Exocrine pancreatic insufficiency 11/30/2011     Past Surgical History:   Procedure Laterality Date     BRONCHOSCOPY (RIGID OR FLEXIBLE), DIAGNOSTIC N/A 10/2/2017    Procedure: COMBINED BRONCHOSCOPY (RIGID OR FLEXIBLE), LAVAGE;  Flexible Bronchoscopy With Lavage, PICC Line Placement ;  Surgeon: Darrel Dudley MD;  Location: [facility] OR     CATARACT IOL, RT/LT Left      EXAM UNDER ANESTHESIA EYE(S) Left 3/17/2015    Procedure: EXAM UNDER ANESTHESIA EYE(S);  Surgeon: Aamir Taylor MD;  Location: [facility] OR     HERNIA REPAIR  December 2014     INSERT PICC LINE Right 10/2/2017    Procedure: INSERT PICC LINE;;  Surgeon: Angeles Singh MD;  Location: [facility] OR     LAPAROSCOPIC ASSISTED INSERTION TUBE GASTROTOMY N/A 10/16/2017    Procedure: LAPAROSCOPIC ASSISTED INSERTION TUBE GASTROSTOMY;  Laparoscopic Gastrostomy Tube Placement.;  Surgeon: Jeremy Obando MD;  Location: [facility] OR     SCRUB ETHYLENEDIAMENETETRAACETIC (EDTA) Left 3/17/2015    Procedure: SCRUB ETHYLENEDIAMENETETRAACETIC (EDTA);  Surgeon: Aamir Taylor MD;  Location: [facility] OR     SINUS SURGERY  3/2011           Family History:     Family History   Problem Relation Age of Onset     Diabetes Paternal Grandfather             Social History:     Social History     Socioeconomic History     Marital  status: Single     Spouse name: Not on file     Number of children: Not on file     Years of education: Not on file     Highest education level: Not on file   Occupational History     Not on file   Social Needs     Financial resource strain: Not on file     Food insecurity     Worry: Not on file     Inability: Not on file     Transportation needs     Medical: Not on file     Non-medical: Not on file   Tobacco Use     Smoking status: Never Smoker     Smokeless tobacco: Never Used   Substance and Sexual Activity     Alcohol use: No     Drug use: No     Sexual activity: Not on file   Lifestyle     Physical activity     Days per week: Not on file     Minutes per session: Not on file     Stress: Not on file   Relationships     Social connections     Talks on phone: Not on file     Gets together: Not on file     Attends Pentecostalism service: Not on file     Active member of club or organization: Not on file     Attends meetings of clubs or organizations: Not on file     Relationship status: Not on file     Intimate partner violence     Fear of current or ex partner: Not on file     Emotionally abused: Not on file     Physically abused: Not on file     Forced sexual activity: Not on file   Other Topics Concern     Parent/sibling w/ CABG, MI or angioplasty before 65F 55M? Not Asked   Social History Narrative    Mom is 35, Dad 39, both healthy.            Medications:     Current Outpatient Medications   Medication     albuterol (VENTOLIN HFA) 108 (90 Base) MCG/ACT inhaler     amylase-lipase-protease (CREON) 47144-03322 units CPEP per EC capsule     azithromycin (ZITHROMAX) 500 MG tablet     aztreonam lysine (CAYSTON) 75 MG SOLR     blood glucose (ACCU-CHEK GUIDE) test strip     blood glucose monitoring (ACCU-CHEK FASTCLIX) lancets     dornase alpha (PULMOZYME) 1 MG/ML neb solution     elexacaftor-tezacaftor-ivacaftor & ivacaftor (TRIKAFTA) 100-50-75 & 150 MG tablet pack     ipratropium - albuterol 0.5 mg/2.5 mg/3 mL (DUONEB)  "0.5-2.5 (3) MG/3ML neb solution     mvw complete formulation (SOFTGELS ) capsule     JOSE ALTERA NEBULIZER SYSTEM MISC     polyethylene glycol (MIRALAX) powder     Respiratory Therapy Supplies (JOSE ALTERA NEBULIZER HANDSET) MISC     sodium chloride inhalant (HYPERSAL) 7 % NEBU neb solution     tobramycin, PF, (NAMRATA) 300 MG/5ML neb solution     Current Facility-Administered Medications   Medication     albuterol (PROVENTIL) neb solution 2.5 mg     aztreonam (AZACTAM) 1 g            Physical Exam:   /83   Pulse 85   Resp 17   Ht 1.727 m (5' 8\")   Wt 69.6 kg (153 lb 7 oz)   SpO2 99%   BMI 23.33 kg/m      GENERAL: alert, NAD  HEENT: NCAT, EOMI, anicteric sclera, drainage in bilateral nares; canals and TMs clear; no oral mucosal edema or erythema  Neck: no cervical or supraclavicular adenopathy  Respiratory: good air flow, no crackles, rhonchi or wheezing  CV: RRR, S1S2, no murmurs noted  Abdomen: normoactive BS, soft and non tender   Lymph: no edema, + digital clubbing  Neuro: AAO X 3, CN 2-12 grossly intact  Psychiatric: normal affect, good eye contact  Skin: no rash, jaundice or lesions on limited exam         Data:   All laboratory and imaging data reviewed.      Cystic Fibrosis Culture  Specimen Description   Date Value Ref Range Status   12/07/2020 Throat  Final   02/03/2020 Throat  Final   02/03/2020 Throat  Final    Culture Micro   Date Value Ref Range Status   12/07/2020 Moderate growth  Normal branden    Final   02/03/2020 Canceled, Test credited  Incorrect specimen type    Final   02/03/2020   Final    Notification of test cancellation was given to  Mindy in [facility]. They do not want a yeast culture instead. 2.3.20 at 1109 bw          PFT interpretation:  Maneuver: valid and meets ATS guidelines  Normal spirometry      Again, thank you for allowing me to participate in the care of your patient.        Sincerely,        Nita Cedeño PA-C    "

## 2021-04-06 ASSESSMENT — ANXIETY QUESTIONNAIRES: GAD7 TOTAL SCORE: 0

## 2021-04-10 LAB
BACTERIA SPEC CULT: NORMAL
Lab: NORMAL
SPECIMEN SOURCE: NORMAL

## 2021-04-21 NOTE — PROGRESS NOTES
Respiratory Therapist Note:         Vest                Brand: Hill-Rom - traditional Hill Rom: Frequencies 8, 9, 10 at pressure 10 then frequencies 18, 19, 20 at pressure 6.                Cough Pause: Cough Pause; Yes                Vest Garment Size: Adult Small                Last Fitting Date: 2021                Frequency of therapy: 12 times per week                Concerns: none         Exercise (purposeful and aerobic for >20 minutes each session): Yes - amount : farms                Does this qualify as additional airway clearance: Yes         Alternative Airway Clearance:               Nebulized Medications                Bronchodilators: Albuterol and Atrovent                Mucolytic: Pulmozyme                Antibiotics:                 Additional Inhaled Medications:                 Spacer Use:          Review Cleaning:          Education and Transition Information                Correct order of inhaled medications: Yes                Mechanism of Action of inhaled medications: Yes                Frequency of inhaled medications: Yes                Dosage of inhaled medications:                 Other:          Home Care:                Nebulizer Cups (Brand/Type): Nery                Nebulizer Compressor                            Year Purchased: 2017                            [facility], Phone: [phone], Fax: [phone]                Nebulizer Supply Company:                            [facility], Phone: [phone], Fax: [phone]         Oxygen:                                     [facility]                Site:                 Date Completed:          Plan of Care and Goals for next visit: Keep up the good work

## 2021-07-15 DIAGNOSIS — E84.9 CYSTIC FIBROSIS (H): ICD-10-CM

## 2021-07-15 RX ORDER — ELEXACAFTOR, TEZACAFTOR, AND IVACAFTOR 100-50-75
KIT ORAL
Qty: 84 TABLET | Refills: 0 | Status: SHIPPED | OUTPATIENT
Start: 2021-07-15 | End: 2021-08-12

## 2021-08-07 NOTE — PROGRESS NOTES
[facility]  August 12, 2021         Assessment and Plan:   Keon Conway is a 20 year old male with cystic fibrosis who is seen today in clinic for routine follow up. He recovered from COVID in mid October.     1. CF lung disease: no new pulmonary complaints, good exercise endurance, working 60 hours/week. Sating 98% on room air; vesting BID. PFTs improved to his recent baseline. 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 No evidence of exacerbation.  - Continue current nebulizers and vest therapy  - Alternating [place] and noemi nebs  - Chronic azithromycin 500 mg three times/week     2. 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 Labs with his next annual studies.   - Continue Trikafta     3. Reactive hypoglycemia: with normal AIC and fasting BS, but 2 hour BS of 36 on OGTT last year and patient completely asymptomatic. AIC during last annuals of 5.1, did not do full OGTT.  - OGTT with annuals in December     4. Exocrine pancreatic insufficiency: with h/o malnutrition s/p G tube placement. Tube fell out and patient has been able to maintain his weight. BMI > CFF goal. BHARATH Daugherty, to see today.   - Continue pancreatic enzymes and vitamin supplementation     5. Hypertriglyceridemia: elevated since 2005, level today was fasting and remains elevated.   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 Will be there until the end of October. Very physically active, no shortness of breath, has been trying to  stay out of the bad air quality. Minimal cough, not productive, no congestion or tightness. Vesting about 6-8 times/week. Refuses to get the covid vaccine, no GI complaints. Does not feel more bloated or gassy, having 1 stools/day. Sinking.          Review of Systems:   Please see HPI. Otherwise, complete 10 point ROS negative.           Past Medical and Surgical History:     Past Medical History:   Diagnosis Date     CF (cystic fibrosis) (H) 11/30/2011     Chronic maxillary sinusitis 11/30/2011     Exocrine pancreatic insufficiency 11/30/2011     Past Surgical History:   Procedure Laterality Date     BRONCHOSCOPY (RIGID OR FLEXIBLE), DIAGNOSTIC N/A 10/2/2017    Procedure: COMBINED BRONCHOSCOPY (RIGID OR FLEXIBLE), LAVAGE;  Flexible Bronchoscopy With Lavage, PICC Line Placement ;  Surgeon: Darrel Dudley MD;  [facility]     CATARACT IOL, RT/LT Left      EXAM UNDER ANESTHESIA EYE(S) Left 3/17/2015    Procedure: EXAM UNDER ANESTHESIA EYE(S);  Surgeon: Aamir Taylor MD;  [facility]     HERNIA REPAIR  December 2014     INSERT PICC LINE Right 10/2/2017    Procedure: INSERT PICC LINE;;  Surgeon: Angeles Singh MD;  [facility]     LAPAROSCOPIC ASSISTED INSERTION TUBE GASTROTOMY N/A 10/16/2017    Procedure: LAPAROSCOPIC ASSISTED INSERTION TUBE GASTROSTOMY;  Laparoscopic Gastrostomy Tube Placement.;  Surgeon: Jeremy Obando MD;  [facility]     SCRUB ETHYLENEDIAMENETETRAACETIC (EDTA) Left 3/17/2015    Procedure: SCRUB ETHYLENEDIAMENETETRAACETIC (EDTA);  Surgeon: Aamir Taylor MD;  [facility]     SINUS SURGERY  3/2011           Family History:     Family History   Problem Relation Age of Onset     Diabetes Paternal Grandfather             Social History:     Social History     Socioeconomic History     Marital status: Single     Spouse name: Not on file     Number of children: Not on file     Years of education: Not on file     Highest education level: Not on file   Occupational  History     Not on file   Tobacco Use     Smoking status: Never Smoker     Smokeless tobacco: Never Used   Substance and Sexual Activity     Alcohol use: No     Drug use: No     Sexual activity: Not on file   Other Topics Concern     Parent/sibling w/ CABG, MI or angioplasty before 65F 55M? Not Asked   Social History Narrative    Mom is 35, Dad 39, both healthy.     Social Determinants of Health     Financial Resource Strain:      Difficulty of Paying Living Expenses:    Food Insecurity:      Worried About Running Out of Food in the Last Year:      Ran Out of Food in the Last Year:    Transportation Needs:      Lack of Transportation (Medical):      Lack of Transportation (Non-Medical):    Physical Activity:      Days of Exercise per Week:      Minutes of Exercise per Session:    Stress:      Feeling of Stress :    Social Connections:      Frequency of Communication with Friends and Family:      Frequency of Social Gatherings with Friends and Family:      Attends Cheondo[employer] Services:      Active Member of Clubs or Organizations:      Attends Club or Organization Meetings:      Marital Status:    Intimate Partner Violence:      Fear of Current or Ex-Partner:      Emotionally Abused:      Physically Abused:      Sexually Abused:             Medications:     Current Outpatient Medications   Medication     elexacaftor-tezacaftor-ivacaftor & ivacaftor (TRIKAFTA) 100-50-75 & 150 MG tablet pack     albuterol (VENTOLIN HFA) 108 (90 Base) MCG/ACT inhaler     amylase-lipase-protease (CREON) 20663-14756 units CPEP per EC capsule     azithromycin (ZITHROMAX) 500 MG tablet     aztreonam lysine (CAYSTON) 75 MG SOLR     blood glucose (ACCU-CHEK GUIDE) test strip     blood glucose monitoring (ACCU-CHEK FASTCLIX) lancets     dornase alpha (PULMOZYME) 1 MG/ML neb solution     ipratropium - albuterol 0.5 mg/2.5 mg/3 mL (DUONEB) 0.5-2.5 (3) MG/3ML neb solution     mvw complete formulation (SOFTGELS ) capsule     JOSE ALTERA  "NEBULIZER SYSTEM MISC     polyethylene glycol (MIRALAX) powder     Respiratory Therapy Supplies (JOSE ALTERA NEBULIZER HANDSET) MISC     sodium chloride inhalant (HYPERSAL) 7 % NEBU neb solution     tobramycin, PF, (NAMRATA) 300 MG/5ML neb solution     Current Facility-Administered Medications   Medication     albuterol (PROVENTIL) neb solution 2.5 mg     aztreonam (AZACTAM) 1 g            Physical Exam:   /71 (BP Location: Right arm, Patient Position: Chair, Cuff Size: Adult Regular)   Pulse 85   Temp 98.1  F (36.7  C)   Ht 1.727 m (5' 8\")   Wt 69.9 kg (154 lb 1.6 oz)   SpO2 98%   BMI 23.43 kg/m      GENERAL: alert, NAD  HEENT: NCAT, EOMI, anicteric sclera, drainage in bilateral nares; canals and TMs clear; no oral mucosal edema or erythema  Neck: no cervical or supraclavicular adenopathy  Respiratory: good air flow, very few fine crackles, otherwise mainly clear  CV: RRR, S1S2, no murmurs noted  Abdomen: normoactive BS, soft   Lymph: no edema, + digital clubbing  Neuro: AAO X 3, CN 2-12 grossly intact  Psychiatric: normal affect, good eye contact  Skin: no rash, jaundice or lesions on limited exam         Data:   All laboratory and imaging data reviewed.      Cystic Fibrosis Culture  Specimen Description   Date Value Ref Range Status   04/05/2021 Throat  Final   12/07/2020 Throat  Final   02/03/2020 Throat  Final    Culture Micro   Date Value Ref Range Status   04/05/2021 Moderate growth  Normal branden    Final   12/07/2020 Moderate growth  Normal branden    Final   02/03/2020 Canceled, Test credited  Incorrect specimen type    Final   02/03/2020   Final    Notification of test cancellation was given to  Mindy in [facility]. They do not want a yeast culture instead. 2.3.20 at 1109 bw          PFT interpretation:  Maneuver: valid and meets ATS guidelines  Normal spirometry      "

## 2021-08-12 ENCOUNTER — OFFICE VISIT (OUTPATIENT)
Dept: PHARMACY | Facility: CLINIC | Age: 21
End: 2021-08-12

## 2021-08-12 ENCOUNTER — OFFICE VISIT (OUTPATIENT)
Dept: PULMONOLOGY | Facility: CLINIC | Age: 21
End: 2021-08-12
Attending: PHYSICIAN ASSISTANT
Payer: COMMERCIAL

## 2021-08-12 ENCOUNTER — TELEPHONE (OUTPATIENT)
Dept: PULMONOLOGY | Facility: CLINIC | Age: 21
End: 2021-08-12

## 2021-08-12 VITALS
BODY MASS INDEX: 23.36 KG/M2 | HEIGHT: 68 IN | WEIGHT: 154.1 LBS | OXYGEN SATURATION: 98 % | DIASTOLIC BLOOD PRESSURE: 71 MMHG | TEMPERATURE: 98.1 F | SYSTOLIC BLOOD PRESSURE: 124 MMHG | HEART RATE: 85 BPM

## 2021-08-12 DIAGNOSIS — R73.09 ABNORMAL GLUCOSE TOLERANCE TEST: Primary | ICD-10-CM

## 2021-08-12 DIAGNOSIS — K86.81 EXOCRINE PANCREATIC INSUFFICIENCY: ICD-10-CM

## 2021-08-12 DIAGNOSIS — E55.9 HYPOVITAMINOSIS D: ICD-10-CM

## 2021-08-12 DIAGNOSIS — E84.9 CF (CYSTIC FIBROSIS) (H): Primary | ICD-10-CM

## 2021-08-12 DIAGNOSIS — E84.0 CYSTIC FIBROSIS WITH PULMONARY MANIFESTATIONS (H): Primary | ICD-10-CM

## 2021-08-12 DIAGNOSIS — E84.9 CF (CYSTIC FIBROSIS) (H): ICD-10-CM

## 2021-08-12 DIAGNOSIS — E84.9 CYSTIC FIBROSIS (H): ICD-10-CM

## 2021-08-12 DIAGNOSIS — Z20.822 COVID-19 RULED OUT: Primary | ICD-10-CM

## 2021-08-12 LAB
EXPTIME-PRE: 8.07 SEC
FEF2575-%PRED-PRE: 97 %
FEF2575-PRE: 4.69 L/SEC
FEF2575-PRED: 4.8 L/SEC
FEFMAX-%PRED-PRE: 117 %
FEFMAX-PRE: 11.32 L/SEC
FEFMAX-PRED: 9.66 L/SEC
FEV1-%PRED-PRE: 100 %
FEV1-PRE: 4.38 L
FEV1FEV6-PRE: 85 %
FEV1FEV6-PRED: 84 %
FEV1FVC-PRE: 85 %
FEV1FVC-PRED: 86 %
FIFMAX-PRE: 8.88 L/SEC
FVC-%PRED-PRE: 100 %
FVC-PRE: 5.15 L
FVC-PRED: 5.15 L

## 2021-08-12 PROCEDURE — 87070 CULTURE OTHR SPECIMN AEROBIC: CPT | Performed by: PHYSICIAN ASSISTANT

## 2021-08-12 PROCEDURE — 97803 MED NUTRITION INDIV SUBSEQ: CPT | Performed by: DIETITIAN, REGISTERED

## 2021-08-12 PROCEDURE — 99207 PR NO CHARGE LOS: CPT | Performed by: PHARMACIST

## 2021-08-12 PROCEDURE — 99213 OFFICE O/P EST LOW 20 MIN: CPT | Mod: 25 | Performed by: PHYSICIAN ASSISTANT

## 2021-08-12 PROCEDURE — 94375 RESPIRATORY FLOW VOLUME LOOP: CPT | Performed by: PHYSICIAN ASSISTANT

## 2021-08-12 RX ORDER — ELEXACAFTOR, TEZACAFTOR, AND IVACAFTOR 100-50-75
KIT ORAL
Qty: 84 TABLET | Refills: 5 | Status: SHIPPED | OUTPATIENT
Start: 2021-08-12 | End: 2022-03-01

## 2021-08-12 ASSESSMENT — MIFFLIN-ST. JEOR: SCORE: 1678.5

## 2021-08-12 ASSESSMENT — PAIN SCALES - GENERAL: PAINLEVEL: NO PAIN (0)

## 2021-08-12 NOTE — NURSING NOTE
No chief complaint on file.    Vitals were taken and medications were reconciled.    Amelia Rodas

## 2021-08-12 NOTE — TELEPHONE ENCOUNTER
Received notification from [place] that Keon's MA secondary, [employer], is not in network with [facility], but his primary insurance restricts patient to [facility].    Trikafta has $160 copay, and patient is not eligible for copay assistance because he has a MA plan.    Calling Opt to see if there is an override to allow him to fill at a different specialty pharnacy that [facility] is in network with. Will ask for [facility].      Per call to [facility], they state they have no authority in this situation and advised calling [facility].    Per call to [facility], Keon or his mom has to call the health plan to ask for an override for a pharmacy that is in network.  Called [facility], rep stated that the only specialty  pharmacy they are in contract with is [facility], which does not have access to Trikafta.    Left message with pharmacy department at [facility] to call back to discuss situation. Let Lakisha Mayers,  know and she will discuss in clinic today.

## 2021-08-12 NOTE — PROGRESS NOTES
Clinical Pharmacy Consult:                                                    Keon Conway is a 21 year old male seen for a clinical pharmacist consult.      Reason for Consult: Medication access issue and Trikafta assessment    Discussion: Met with Keon in clinic today to discuss an issue related to his Trikafta prescription. Keon has primary insurance through his mother's employer and secondary insurance through a [employer] PMAP. His primary insurance requires him to use [facility] to fill his Trikafta, however his secondary insurance is not in network with [facility] and therefore his Trikafta prescription has a $160 copay. Keon states that his mom handles his medication orders and he is not familiar with his current insurance plans.    Keon has been on Trikafta since November 2019. He has had a significant improvement in PFTs and reports he feels much better, with decreased cough/congestion and increased exercise tolerance. He is not experiencing any side effects. Hepatic panel will be checked with annual studies at his next visit.    Also discussed the COVID vaccine. Keon has not gotten vaccinated. He does not verbalize any specific reasons for not getting vaccinated, mainly that he hasn't thought about it. We discussed benefits of vaccination and how to find a location where he can receive the vaccine.    Plan:  1. CF  will contact Keon's mother to discuss insurance issues.  2. Will re-visit COVID vaccination at next clinic appointment.      Katelyn Adames, PharmD  Cystic Fibrosis Pharmacist  [facility]  [phone]

## 2021-08-12 NOTE — LETTER
8/12/2021         RE: Keon Conway  [address]  [place] [address]        Dear Colleague,    Thank you for referring your patient, Keon Conway, to the [facility] [place]. Please see a copy of my visit note below.    [facility]  August 12, 2021         Assessment and Plan:   Keon Conway is a 20 year old male with cystic fibrosis who is seen today in clinic for routine follow up. He recovered from COVID in mid October.     1. CF lung disease: no new pulmonary complaints, good exercise endurance, working 60 hours/week. Sating 98% on room air; vesting BID. PFTs improved to his recent baseline. 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 No evidence of exacerbation.  - Continue current nebulizers and vest therapy  - Alternating [place] and noemi nebs  - Chronic azithromycin 500 mg three times/week     2. 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 Labs with his next annual studies.   - Continue Trikafta     3. Reactive hypoglycemia: with normal AIC and fasting BS, but 2 hour BS of 36 on OGTT last year and patient completely asymptomatic. AIC during last annuals of 5.1, did not do full OGTT.  - OGTT with annuals in December 4. Exocrine pancreatic insufficiency: with h/o malnutrition s/p G tube placement. Tube fell out and patient has been able to maintain his weight. BMI > CFF goal. BHARATH Daugherty, to see today.   - Continue pancreatic enzymes and vitamin supplementation     5. Hypertriglyceridemia: elevated since 2005, level today was fasting and remains elevated.   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 Will be there until the end of October. Very physically active, no shortness of breath, has been trying to stay out of the bad air quality. Minimal cough, not productive, no congestion or tightness. Vesting about 6-8 times/week. Refuses to get the covid vaccine, no GI complaints. Does not feel more bloated or gassy, having 1 stools/day. Sinking.          Review of Systems:   Please see HPI. Otherwise, complete 10 point ROS negative.           Past Medical and Surgical History:     Past Medical History:   Diagnosis Date     CF (cystic fibrosis) (H) 11/30/2011     Chronic maxillary sinusitis 11/30/2011     Exocrine pancreatic insufficiency 11/30/2011     Past Surgical History:   Procedure Laterality Date     BRONCHOSCOPY (RIGID OR FLEXIBLE), DIAGNOSTIC N/A 10/2/2017    Procedure: COMBINED BRONCHOSCOPY (RIGID OR FLEXIBLE), LAVAGE;  Flexible Bronchoscopy With Lavage, PICC Line Placement ;  Surgeon: Darrel Dudley MD;  [facility]     CATARACT IOL, RT/LT Left      EXAM UNDER ANESTHESIA EYE(S) Left 3/17/2015    Procedure: EXAM UNDER ANESTHESIA EYE(S);  Surgeon: Aamir Taylor MD;  [facility]     HERNIA REPAIR  December 2014     INSERT PICC LINE Right 10/2/2017    Procedure: INSERT PICC LINE;;  Surgeon: Angeles Singh MD;  [facility]     LAPAROSCOPIC ASSISTED INSERTION TUBE GASTROTOMY N/A 10/16/2017    Procedure: LAPAROSCOPIC ASSISTED INSERTION TUBE GASTROSTOMY;  Laparoscopic Gastrostomy Tube Placement.;  Surgeon: Jeremy Obando MD;  [facility]     SCRUB ETHYLENEDIAMENETETRAACETIC (EDTA) Left 3/17/2015    Procedure: SCRUB ETHYLENEDIAMENETETRAACETIC (EDTA);  Surgeon: Aamir Taylor MD;  [facility]     SINUS SURGERY  3/2011           Family History:     Family History   Problem Relation Age of  Onset     Diabetes Paternal Grandfather             Social History:     Social History     Socioeconomic History     Marital status: Single     Spouse name: Not on file     Number of children: Not on file     Years of education: Not on file     Highest education level: Not on file   Occupational History     Not on file   Tobacco Use     Smoking status: Never Smoker     Smokeless tobacco: Never Used   Substance and Sexual Activity     Alcohol use: No     Drug use: No     Sexual activity: Not on file   Other Topics Concern     Parent/sibling w/ CABG, MI or angioplasty before 65F 55M? Not Asked   Social History Narrative    Mom is 35, Dad 39, both healthy.     Social Determinants of Health     Financial Resource Strain:      Difficulty of Paying Living Expenses:    Food Insecurity:      Worried About Running Out of Food in the Last Year:      Ran Out of Food in the Last Year:    Transportation Needs:      Lack of Transportation (Medical):      Lack of Transportation (Non-Medical):    Physical Activity:      Days of Exercise per Week:      Minutes of Exercise per Session:    Stress:      Feeling of Stress :    Social Connections:      Frequency of Communication with Friends and Family:      Frequency of Social Gatherings with Friends and Family:      Attends Jehovah's witness Services:      Active Member of Clubs or Organizations:      Attends Club or Organization Meetings:      Marital Status:    Intimate Partner Violence:      Fear of Current or Ex-Partner:      Emotionally Abused:      Physically Abused:      Sexually Abused:             Medications:     Current Outpatient Medications   Medication     elexacaftor-tezacaftor-ivacaftor & ivacaftor (TRIKAFTA) 100-50-75 & 150 MG tablet pack     albuterol (VENTOLIN HFA) 108 (90 Base) MCG/ACT inhaler     amylase-lipase-protease (CREON) 02055-67517 units CPEP per EC capsule     azithromycin (ZITHROMAX) 500 MG tablet     aztreonam lysine (CAYSTON) 75 MG SOLR     blood glucose  "(ACCU-CHEK GUIDE) test strip     blood glucose monitoring (ACCU-CHEK FASTCLIX) lancets     dornase alpha (PULMOZYME) 1 MG/ML neb solution     ipratropium - albuterol 0.5 mg/2.5 mg/3 mL (DUONEB) 0.5-2.5 (3) MG/3ML neb solution     mvw complete formulation (SOFTGELS ) capsule     JOSE ALTERA NEBULIZER SYSTEM MISC     polyethylene glycol (MIRALAX) powder     Respiratory Therapy Supplies (JOSE ALTERA NEBULIZER HANDSET) MISC     sodium chloride inhalant (HYPERSAL) 7 % NEBU neb solution     tobramycin, PF, (NAMRATA) 300 MG/5ML neb solution     Current Facility-Administered Medications   Medication     albuterol (PROVENTIL) neb solution 2.5 mg     aztreonam (AZACTAM) 1 g            Physical Exam:   /71 (BP Location: Right arm, Patient Position: Chair, Cuff Size: Adult Regular)   Pulse 85   Temp 98.1  F (36.7  C)   Ht 1.727 m (5' 8\")   Wt 69.9 kg (154 lb 1.6 oz)   SpO2 98%   BMI 23.43 kg/m      GENERAL: alert, NAD  HEENT: NCAT, EOMI, anicteric sclera, drainage in bilateral nares; canals and TMs clear; no oral mucosal edema or erythema  Neck: no cervical or supraclavicular adenopathy  Respiratory: good air flow, very few fine crackles, otherwise mainly clear  CV: RRR, S1S2, no murmurs noted  Abdomen: normoactive BS, soft   Lymph: no edema, + digital clubbing  Neuro: AAO X 3, CN 2-12 grossly intact  Psychiatric: normal affect, good eye contact  Skin: no rash, jaundice or lesions on limited exam         Data:   All laboratory and imaging data reviewed.      Cystic Fibrosis Culture  Specimen Description   Date Value Ref Range Status   04/05/2021 Throat  Final   12/07/2020 Throat  Final   02/03/2020 Throat  Final    Culture Micro   Date Value Ref Range Status   04/05/2021 Moderate growth  Normal branden    Final   12/07/2020 Moderate growth  Normal branden    Final   02/03/2020 Canceled, Test credited  Incorrect specimen type    Final   02/03/2020   Final    Notification of test cancellation was given to  Mindy in " [facility]. They do not want a yeast culture instead. 2.3.20 at 1109 bw          PFT interpretation:  Maneuver: valid and meets ATS guidelines  Normal spirometry        CF Annual Nutrition Assessment    Reason for Assessment  Assessed during clinic visit with Nita Cedeño r/t increased nutrition risk with diagnosis of CF per protocol.    Nutrition Significant PMH  Mild Lung Disease - taking Trikafta  Pancreatic Insufficient  G-tube placed  -- fell out 2021 and maintaining weight  Reactive hypoglycemia with OGTT    Anthropometric Assessment  Height: 172.7 cm  IBW based on BMI 22 for females and 23 for males per CF Foundation recs: 68.6 kg (151 lbs)  Today's Weight: 69.9 kg (actual weight) (154 lbs)  Body mass index is 23.43 kg/m .     Wt Readings from Last 5 Encounters:   21 69.9 kg (154 lb 1.6 oz)   21 69.6 kg (153 lb 7 oz)   20 70.2 kg (154 lb 12.2 oz)   20 71 kg (156 lb 8.4 oz)   19 70.1 kg (154 lb 8.7 oz) (49 %, Z= -0.02)*     * Growth percentiles are based on CDC (Boys, 2-20 Years) data.     Comments: Weight remained stable over the last year and at CFF goal.     Pancreatic Enzymes  Brand:  Creon [id number]  Dosin with meals, 3 with snacks = 2060 units lipase/kg/meal  Estimated Daily Intake: 20-24 caps = 8230 units lipase/kg/day    Signs of Malabsorption:  No  Enzyme Program:  None    Diet History and Assessment  Diet Preferences/Allergies/Intolerances: High Kcal/Pro.   Intake Recall/Comments: Fair/good appetite at baseline, reports typical intake TID meals + 1-2 snacks daily. Mom does majority of cooking but Keon does some grocery shopping and grilling. Feels he may be eating more during the day to make up for lack of TFs overnight. Overall consumes good variety including fruits, vegetables, and dairy foods/protein.     Diet Recall:  Breakfast- few bowls of cereal or leftovers from supper  Lunch- pack leftovers to eat at work or 3-4 ham sandwiches + 2 cups fruit + granola  bar  Dinner- Mom cooks, usually meat/potatoes  Snacks- PB/cheese crackers, granola bars, yogurt, peanut    Calcium: adequate with 3+ cups of whole milk and cheese/yogurt  Salt: adds to foods + Gatorade  Hydration: adequate, drinks lots of water/milk, occasionally sports drinks like Gatorade.   Supplements: No  Tube Feeding: No - GT fell out in March 2021. Has been able to maintain weight recently without TFs.   - Previous regimen: Nutren 2.0 @ 80 mL/hr x 6 hrs to provide 500 mls, 1000 kcals, 42 g pro.     Estimated Energy and Protein Needs  Estimation based on weight maintenance/gain with Mild lung disease and pancreatic insufficient.    BEE: 1700   7850-7723 kcals/day = 175-200% BEE  100-130 g protein/day = 1.5-2 g/kg    Laboratory Assessment  Annual study labs obtained Dec 2020  Vitamin A- 0.93 wnl  Vitamin D- 19 Low  Vitamin E 7.5 wnl  Iron- 152 wnl  Lipid Panel- TG elevated    OGTT- 2019 with reactive hypoglycemia. Peaks at 189 at 0.5-hr down to 36 at 2-hr  DEXA- 2019, WNL    Current Vitamin/Mineral Prescription: prescribed MVW Complete D5,000 - 1 cap per day  Comments: prescription written for [facility] - unclear which vitamin pt is currently taking.     CF Related Diabetes Evaluation  Keon asymptomatic with hypoglycemia noted on OGTT. Educated by CF RD on 5/8/19 for diet recs to prevent reactive hypoglycemia.   -- Seen by endocrine/Dr. Castro and CDE 2019. Given glucose meter and recommended to complete BG checks if symptomatic. Complete OGTT annually.    Nutrition Diagnosis  Impaired nutrient utilization related to pancreatic insufficiency as evidenced by requires pancreatic enzyme replacement therapy, high kcal/pro diet, and vitamin/mineral supplementation in order to maintain BMI >23 kg/m2 and support overall health.     Interventions/Recommendations  1) Discussed current nutrition status including review of weight trends and adequacy of total calorie intake. GT fell out at home and closed over when pt  tried to replace on his own. Has since been able to maintain weight with increased PO at this time. Will continue to monitor weight trends but praised pt for weight maintenance. No GI concerns noted today and will continue with current enzyme regimen.     Keon reports looking for a house and may move out from home. Encouraged pt to start becoming involved in meal prep/cooking and planning so that he is better prepared when he lives on his own.     2) Pt will be due for updated annual study labs and OGTT and next clinic visit. Unclear which vitamin pt is taking - may be general MVI vs CF-specific MVI. Will send [employer] message requesting pt take picture of label to send to RD. Will modify regimen at that time if needed given low Vit D level.     GOALS:  1) Weight maintenance BMI >23 kg/m2  2) Take vitamins/enzymes as prescribed    FOLLOW-UP/MONITORING:  Visit patient within 12 month(s) for annual nutrition assessment.     Time Spent In Face-to-Face Patient Interactions: 15 minutes    Willow Mendes RD, LD  Cystic Fibrosis/Lung Transplant Dietitian  Pager [phone]           Again, thank you for allowing me to participate in the care of your patient.        Sincerely,        Nita Cedeño PA-C

## 2021-08-12 NOTE — PATIENT INSTRUCTIONS
Cystic Fibrosis Self-Care Plan       Patient: Keon Conway   MRN: [id number]   Clinic Date: August 12, 2021     RECOMMENDATIONS:  1. Continue nebulizers and vest therapy.   2. Get the flu shot in early October.   3. Please consider to the covid vaccine given the highly contagious delta variant that is going around and affecting unvaccinated people.     Annual Studies:   IGG   Date Value Ref Range Status   12/07/2020 979 610 - 1,616 mg/dL Final     Insulin   Date Value Ref Range Status   05/08/2019 5.7 3 - 25 mU/L Final     There are no preventive care reminders to display for this patient.    Pulmonary Function Tests  FEV1: amount of air you can blow out in 1 second  FVC: total amount of air you can take in and blow out    Your Goals:         PFT Latest Ref Rng & Units 8/12/2021   FVC L 5.15   FEV1 L 4.38   FVC% % 100   FEV1% % 100          Airway Clearance: The Most Important Way to Keep Your Lungs Healthy  Vest Settings:    Hill-[place] Frequencies: 8, 9, 10 Pressure 10 Then, Frequencies 18, 19, 20 Pressure 6      RespirTech: Quick Start with Pressure of     Do each frequency for 5 minutes; Deflate vest after each frequency & cough 3 times before beginning the next setting.    Vest and Neb Therapy should be done 2 times/day.    Good Nutrition Can Improve Lung Function and Overall Health     Take ALL of your vitamins with food     Take 1/2 of your enzymes before EVERY meal/snack and the other 1/2 mid-meal/snack    Wt Readings from Last 3 Encounters:   08/12/21 69.9 kg (154 lb 1.6 oz)   04/05/21 69.6 kg (153 lb 7 oz)   12/07/20 70.2 kg (154 lb 12.2 oz)       Body mass index is 23.43 kg/m .         National CF Foundation Recommendations for BMI in CF Adults: Women: at least 22 Men: at least 23        Controlling Blood Sugars Helps Prevent Lung Infections & Improves Nutrition  Test blood sugar:     In the morning before eating (goal is )     2 hours after a meal (goal is less than 150)     When pre-meal  glucose is greater than 150 add correction     At bedtime (if less than 100 eat a snack with 15 grams of carbohydrates  Last A1C Results:   Hemoglobin A1C   Date Value Ref Range Status   12/07/2020 5.1 0 - 5.6 % Final     Comment:     Normal <5.7% Prediabetes 5.7-6.4%  Diabetes 6.5% or higher - adopted from ADA   consensus guidelines.           If diabetic, measure A1C every 6 months. Goal is under 7%.    Staying Healthy    Research: If you are interested in learning about research opportunities or have questions, please contact Lily Daniels at [phone] or [email or web address].       Foundation: Compass is a personalized resource service to help you with the insurance, financial, legal and other issues you are facing.  It's free, confidential and available to anyone with CF.  Ask your  for more information or contact Compass directly at [facility] ([phone]) or compass@cff.org, or learn more at cff.org/compass.       CF Nurse Line: Rodney Ken: [phone]   Marcelina Sales, RT: [phone]     Kaye Stark and Willow Mendes , Dieticians: [phone]     Mindy Pedro, Diabetes Nurse: [phone]    Felicia Garcia: [phone] or Lakisha Lay at [phone], Social Workers   [email or web address]

## 2021-08-17 LAB — BACTERIA SPEC CULT: NORMAL

## 2021-08-18 NOTE — PROGRESS NOTES
CF Annual Nutrition Assessment    Reason for Assessment  Assessed during clinic visit with Nita Cedeño r/t increased nutrition risk with diagnosis of CF per protocol.    Nutrition Significant PMH  Mild Lung Disease - taking Trikafta  Pancreatic Insufficient  G-tube placed  -- fell out 2021 and maintaining weight  Reactive hypoglycemia with OGTT    Anthropometric Assessment  Height: 172.7 cm  IBW based on BMI 22 for females and 23 for males per CF Foundation recs: 68.6 kg (151 lbs)  Today's Weight: 69.9 kg (actual weight) (154 lbs)  Body mass index is 23.43 kg/m .     Wt Readings from Last 5 Encounters:   21 69.9 kg (154 lb 1.6 oz)   21 69.6 kg (153 lb 7 oz)   20 70.2 kg (154 lb 12.2 oz)   20 71 kg (156 lb 8.4 oz)   19 70.1 kg (154 lb 8.7 oz) (49 %, Z= -0.02)*     * Growth percentiles are based on CDC (Boys, 2-20 Years) data.     Comments: Weight remained stable over the last year and at CFF goal.     Pancreatic Enzymes  Brand:  Creon [id number]  Dosin with meals, 3 with snacks = 2060 units lipase/kg/meal  Estimated Daily Intake: 20-24 caps = 8230 units lipase/kg/day    Signs of Malabsorption:  No  Enzyme Program:  None    Diet History and Assessment  Diet Preferences/Allergies/Intolerances: High Kcal/Pro.   Intake Recall/Comments: Fair/good appetite at baseline, reports typical intake TID meals + 1-2 snacks daily. Mom does majority of cooking but Keon does some grocery shopping and grilling. Feels he may be eating more during the day to make up for lack of TFs overnight. Overall consumes good variety including fruits, vegetables, and dairy foods/protein.     Diet Recall:  Breakfast- few bowls of cereal or leftovers from supper  Lunch- pack leftovers to eat at work or 3-4 ham sandwiches + 2 cups fruit + granola bar  Dinner- Mom cooks, usually meat/potatoes  Snacks- PB/cheese crackers, granola bars, yogurt, peanut    Calcium: adequate with 3+ cups of whole milk and  cheese/yogurt  Salt: adds to foods + Gatorade  Hydration: adequate, drinks lots of water/milk, occasionally sports drinks like Gatorade.   Supplements: No  Tube Feeding: No - GT fell out in March 2021. Has been able to maintain weight recently without TFs.   - Previous regimen: Nutren 2.0 @ 80 mL/hr x 6 hrs to provide 500 mls, 1000 kcals, 42 g pro.     Estimated Energy and Protein Needs  Estimation based on weight maintenance/gain with Mild lung disease and pancreatic insufficient.    BEE: 1700   5103-9187 kcals/day = 175-200% BEE  100-130 g protein/day = 1.5-2 g/kg    Laboratory Assessment  Annual study labs obtained Dec 2020  Vitamin A- 0.93 wnl  Vitamin D- 19 Low  Vitamin E 7.5 wnl  Iron- 152 wnl  Lipid Panel- TG elevated    OGTT- 2019 with reactive hypoglycemia. Peaks at 189 at 0.5-hr down to 36 at 2-hr  DEXA- 2019, WNL    Current Vitamin/Mineral Prescription: prescribed MVW Complete D5,000 - 1 cap per day  Comments: prescription written for [place]orn's - unclear which vitamin pt is currently taking.     CF Related Diabetes Evaluation  Keon asymptomatic with hypoglycemia noted on OGTT. Educated by CF RD on 5/8/19 for diet recs to prevent reactive hypoglycemia.   -- Seen by endocrine/Dr. Castro and CDE 2019. Given glucose meter and recommended to complete BG checks if symptomatic. Complete OGTT annually.    Nutrition Diagnosis  Impaired nutrient utilization related to pancreatic insufficiency as evidenced by requires pancreatic enzyme replacement therapy, high kcal/pro diet, and vitamin/mineral supplementation in order to maintain BMI >23 kg/m2 and support overall health.     Interventions/Recommendations  1) Discussed current nutrition status including review of weight trends and adequacy of total calorie intake. GT fell out at home and closed over when pt tried to replace on his own. Has since been able to maintain weight with increased PO at this time. Will continue to monitor weight trends but praised pt for  weight maintenance. No GI concerns noted today and will continue with current enzyme regimen.     Keon reports looking for a house and may move out from home. Encouraged pt to start becoming involved in meal prep/cooking and planning so that he is better prepared when he lives on his own.     2) Pt will be due for updated annual study labs and OGTT and next clinic visit. Unclear which vitamin pt is taking - may be general MVI vs CF-specific MVI. Will send sCoolTV message requesting pt take picture of label to send to RD. Will modify regimen at that time if needed given low Vit D level.     GOALS:  1) Weight maintenance BMI >23 kg/m2  2) Take vitamins/enzymes as prescribed    FOLLOW-UP/MONITORING:  Visit patient within 12 month(s) for annual nutrition assessment.     Time Spent In Face-to-Face Patient Interactions: 15 minutes    Willow Mendes RD, LD  Cystic Fibrosis/Lung Transplant Dietitian  Pager [phone]

## 2021-08-27 DIAGNOSIS — E84.9 CF (CYSTIC FIBROSIS) (H): ICD-10-CM

## 2021-08-27 RX ORDER — TOBRAMYCIN INHALATION SOLUTION 300 MG/5ML
300 INHALANT RESPIRATORY (INHALATION) 2 TIMES DAILY
Qty: 280 ML | Refills: 6 | Status: SHIPPED | OUTPATIENT
Start: 2021-08-27 | End: 2022-03-01

## 2021-09-01 DIAGNOSIS — Z20.822 COVID-19 RULED OUT: Primary | ICD-10-CM

## 2021-09-18 ENCOUNTER — HEALTH MAINTENANCE LETTER (OUTPATIENT)
Age: 21
End: 2021-09-18

## 2021-11-29 DIAGNOSIS — E84.9 CF (CYSTIC FIBROSIS) (H): ICD-10-CM

## 2021-12-06 NOTE — PROGRESS NOTES
[facility]  December 7, 2021         Assessment and Plan:   Keon Conway is a 21 year old male with cystic fibrosis who is seen today in clinic for routine follow up.     1. CF lung disease: no new pulmonary complaints, good exercise endurance, working 30-40 hours per week over the winter. Sating 98% on room air; vesting BID. PFTs were not scheduled today and he was unable to do them, prior PFTs in August were at his best. 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 No evidence of exacerbation.  - Continue current nebulizers and vest therapy  - Alternating [place] and noemi nebs  - Chronic azithromycin 500 mg three times/week     2. 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 Hepatic panel and CK today WNL.   - Continue Trikafta     3. Reactive hypoglycemia: AIC today of 5.2 with fasting BS < 100, but significant post hypoglycemia down to 26, mild symptoms. Per patient, he has symptoms of low BS 2-3 times/week, then has a snack. Is not routinely checking BS, Kaye, RD to see today.   - Encouraged patient to check BS periodically, discussed frequent small meals  - Will have his f/u with Dr. Castro and Mindy--could benefit from continuous glucose montior     4. Exocrine pancreatic insufficiency: with h/o malnutrition s/p G tube placement. Tube fell out and patient has been able to maintain his weight. BMI slight < CFF goal.   - Continue pancreatic enzymes and vitamin supplementation     5. Hypertriglyceridemia: elevated since 2005, level today was fasting and remains elevated.     6. HCM: reviewed with the patient including normal CBC, CMP, low cholesterol, low HDL, normal iron, elevated TGs per above, normal TSH, low vitamin D. CXR reviewed and demonstrates improved aeration and changes of CF. DEXA reviewed with Z scores WNL.      RTC: 3 months  Annual: December 2022  Vaccinations:  discussed covid vaccine and he is not willing to get that at this time; did get in the influenza vaccine today    Nita Cedeño PA-C  Pulmonary, Allergy, Critical Care and Sleep Medicine        Interval History:     Working about 30-40 hours per week, always moving, doing some ice fishing. Minimal cough, no tightness or shortness of breath. Vesting BID. No new GI complaints, stools are 1-2 per day, not fatty or floating.          Review of Systems:   Please see HPI. Otherwise, complete 10 point ROS negative.           Past Medical and Surgical History:     Past Medical History:   Diagnosis Date     CF (cystic fibrosis) (H) 11/30/2011     Chronic maxillary sinusitis 11/30/2011     Exocrine pancreatic insufficiency 11/30/2011     Past Surgical History:   Procedure Laterality Date     BRONCHOSCOPY (RIGID OR FLEXIBLE), DIAGNOSTIC N/A 10/2/2017    Procedure: COMBINED BRONCHOSCOPY (RIGID OR FLEXIBLE), LAVAGE;  Flexible Bronchoscopy With Lavage, PICC Line Placement ;  Surgeon: Darrel Dudley MD;  Location: [facility] OR     CATARACT IOL, RT/LT Left      EXAM UNDER ANESTHESIA EYE(S) Left 3/17/2015    Procedure: EXAM UNDER ANESTHESIA EYE(S);  Surgeon: Aamir Taylor MD;  Location: [facility] OR     HERNIA REPAIR  December 2014     INSERT PICC LINE Right 10/2/2017    Procedure: INSERT PICC LINE;;  Surgeon: Angeles Singh MD;  Location: [facility] OR     LAPAROSCOPIC ASSISTED INSERTION TUBE GASTROTOMY N/A 10/16/2017    Procedure: LAPAROSCOPIC ASSISTED INSERTION TUBE GASTROSTOMY;  Laparoscopic Gastrostomy Tube Placement.;  Surgeon: Jeremy Obando MD;  Location: [facility] OR     SCRUB ETHYLENEDIAMENETETRAACETIC (EDTA) Left 3/17/2015    Procedure: SCRUB ETHYLENEDIAMENETETRAACETIC (EDTA);  Surgeon: Aamir Taylor MD;  Location: [facility] OR     SINUS SURGERY  3/2011           Family History:     Family History   Problem Relation Age of Onset     Diabetes Paternal Grandfather             Social History:     Social History     Socioeconomic History  "    Marital status: Single     Spouse name: Not on file     Number of children: Not on file     Years of education: Not on file     Highest education level: Not on file   Occupational History     Not on file   Tobacco Use     Smoking status: Never Smoker     Smokeless tobacco: Never Used   Substance and Sexual Activity     Alcohol use: No     Drug use: No     Sexual activity: Not on file   Other Topics Concern     Parent/sibling w/ CABG, MI or angioplasty before 65F 55M? Not Asked   Social History Narrative    Mom is 35, Dad 39, both healthy.     Social Determinants of Health     Financial Resource Strain: Not on file   Food Insecurity: Not on file   Transportation Needs: Not on file   Physical Activity: Not on file   Stress: Not on file   Social Connections: Not on file   Intimate Partner Violence: Not on file   Housing Stability: Not on file            Medications:     Current Outpatient Medications   Medication     albuterol (VENTOLIN HFA) 108 (90 Base) MCG/ACT inhaler     amylase-lipase-protease (CREON) 87841-26355 units CPEP per EC capsule     azithromycin (ZITHROMAX) 500 MG tablet     aztreonam lysine (CAYSTON) 75 MG SOLR     blood glucose (ACCU-CHEK GUIDE) test strip     blood glucose monitoring (ACCU-CHEK FASTCLIX) lancets     dornase alpha (PULMOZYME) 1 MG/ML neb solution     elexacaftor-tezacaftor-ivacaftor & ivacaftor (TRIKAFTA) 100-50-75 & 150 MG tablet pack     ipratropium - albuterol 0.5 mg/2.5 mg/3 mL (DUONEB) 0.5-2.5 (3) MG/3ML neb solution     mvw complete formulation (SOFTGELS ) capsule     JOSE ALTERA NEBULIZER SYSTEM MISC     polyethylene glycol (MIRALAX) powder     Respiratory Therapy Supplies (JOSE ALTERA NEBULIZER HANDSET) MISC     sodium chloride inhalant (HYPERSAL) 7 % NEBU neb solution     tobramycin, PF, (NAMRATA) 300 MG/5ML neb solution     No current facility-administered medications for this visit.            Physical Exam:   /74   Pulse 87   Ht 1.727 m (5' 8\")   Wt 68 kg " (150 lb)   SpO2 98%   BMI 22.81 kg/m      GENERAL: alert, NAD  HEENT: NCAT, EOMI, anicteric sclera, canals and TMs clear; no oral mucosal edema or erythema  Neck: no cervical or supraclavicular adenopathy  Respiratory: good air flow, mainly clear  CV: RRR, S1S2, no murmurs noted  Abdomen: normoactive BS, soft   Lymph: no edema, + digital clubbing  Neuro: AAO X 3, CN 2-12 grossly intact  Psychiatric: normal affect, good eye contact  Skin: no rash, jaundice or lesions on limited exam         Data:   All laboratory and imaging data reviewed.      Cystic Fibrosis Culture  Specimen Description   Date Value Ref Range Status   04/05/2021 Throat  Final   12/07/2020 Throat  Final   02/03/2020 Throat  Final    Culture Micro   Date Value Ref Range Status   04/05/2021 Moderate growth  Normal branden    Final   12/07/2020 Moderate growth  Normal branden    Final   02/03/2020 Canceled, Test credited  Incorrect specimen type    Final   02/03/2020   Final    Notification of test cancellation was given to  Mindy in [place]. They do not want a yeast culture instead. 2.3.20 at 1109 bw          PFT interpretation:  Maneuver: valid and meets ATS guidelines  Normal spirometry

## 2021-12-07 ENCOUNTER — OFFICE VISIT (OUTPATIENT)
Dept: PULMONOLOGY | Facility: CLINIC | Age: 21
End: 2021-12-07
Attending: PHYSICIAN ASSISTANT
Payer: COMMERCIAL

## 2021-12-07 ENCOUNTER — ANCILLARY PROCEDURE (OUTPATIENT)
Dept: BONE DENSITY | Facility: CLINIC | Age: 21
End: 2021-12-07
Attending: PHYSICIAN ASSISTANT
Payer: COMMERCIAL

## 2021-12-07 ENCOUNTER — ANCILLARY PROCEDURE (OUTPATIENT)
Dept: GENERAL RADIOLOGY | Facility: CLINIC | Age: 21
End: 2021-12-07
Attending: PHYSICIAN ASSISTANT
Payer: COMMERCIAL

## 2021-12-07 ENCOUNTER — CARE COORDINATION (OUTPATIENT)
Dept: PULMONOLOGY | Facility: CLINIC | Age: 21
End: 2021-12-07

## 2021-12-07 ENCOUNTER — INFUSION THERAPY VISIT (OUTPATIENT)
Dept: INFUSION THERAPY | Facility: CLINIC | Age: 21
End: 2021-12-07
Attending: PHYSICIAN ASSISTANT
Payer: COMMERCIAL

## 2021-12-07 VITALS
OXYGEN SATURATION: 100 % | DIASTOLIC BLOOD PRESSURE: 86 MMHG | SYSTOLIC BLOOD PRESSURE: 138 MMHG | RESPIRATION RATE: 16 BRPM | HEART RATE: 93 BPM | TEMPERATURE: 98 F

## 2021-12-07 VITALS
BODY MASS INDEX: 22.73 KG/M2 | DIASTOLIC BLOOD PRESSURE: 74 MMHG | HEART RATE: 87 BPM | WEIGHT: 150 LBS | SYSTOLIC BLOOD PRESSURE: 125 MMHG | HEIGHT: 68 IN | OXYGEN SATURATION: 98 %

## 2021-12-07 DIAGNOSIS — K86.81 EXOCRINE PANCREATIC INSUFFICIENCY: ICD-10-CM

## 2021-12-07 DIAGNOSIS — E84.9 CYSTIC FIBROSIS (H): ICD-10-CM

## 2021-12-07 DIAGNOSIS — R73.09 ABNORMAL GLUCOSE TOLERANCE TEST: ICD-10-CM

## 2021-12-07 DIAGNOSIS — E55.9 HYPOVITAMINOSIS D: ICD-10-CM

## 2021-12-07 DIAGNOSIS — J32.0 CHRONIC MAXILLARY SINUSITIS: ICD-10-CM

## 2021-12-07 DIAGNOSIS — K21.9 ESOPHAGEAL REFLUX: Primary | ICD-10-CM

## 2021-12-07 DIAGNOSIS — E84.9 CF (CYSTIC FIBROSIS) (H): ICD-10-CM

## 2021-12-07 DIAGNOSIS — E84.9 CF (CYSTIC FIBROSIS) (H): Primary | ICD-10-CM

## 2021-12-07 LAB
ALBUMIN SERPL-MCNC: 3.9 G/DL (ref 3.4–5)
ALBUMIN UR-MCNC: NEGATIVE MG/DL
ALP SERPL-CCNC: 145 U/L (ref 40–150)
ALT SERPL W P-5'-P-CCNC: 35 U/L (ref 0–70)
ANION GAP SERPL CALCULATED.3IONS-SCNC: 8 MMOL/L (ref 3–14)
APPEARANCE UR: CLEAR
AST SERPL W P-5'-P-CCNC: 24 U/L (ref 0–45)
BASOPHILS # BLD AUTO: 0 10E3/UL (ref 0–0.2)
BASOPHILS NFR BLD AUTO: 1 %
BILIRUB DIRECT SERPL-MCNC: <0.1 MG/DL (ref 0–0.2)
BILIRUB SERPL-MCNC: 0.3 MG/DL (ref 0.2–1.3)
BILIRUB UR QL STRIP: NEGATIVE
BUN SERPL-MCNC: 16 MG/DL (ref 7–30)
CALCIUM SERPL-MCNC: 9.5 MG/DL (ref 8.5–10.1)
CHLORIDE BLD-SCNC: 106 MMOL/L (ref 94–109)
CHOLEST SERPL-MCNC: 123 MG/DL
CK SERPL-CCNC: 69 U/L (ref 30–300)
CO2 SERPL-SCNC: 25 MMOL/L (ref 20–32)
COLOR UR AUTO: YELLOW
CREAT SERPL-MCNC: 0.72 MG/DL (ref 0.66–1.25)
CREAT UR-MCNC: 103 MG/DL
DEPRECATED CALCIDIOL+CALCIFEROL SERPL-MC: 17 UG/L (ref 20–75)
EOSINOPHIL # BLD AUTO: 0.1 10E3/UL (ref 0–0.7)
EOSINOPHIL NFR BLD AUTO: 1 %
ERYTHROCYTE [DISTWIDTH] IN BLOOD BY AUTOMATED COUNT: 11.8 % (ref 10–15)
ERYTHROCYTE [SEDIMENTATION RATE] IN BLOOD BY WESTERGREN METHOD: 9 MM/HR (ref 0–15)
FASTING STATUS PATIENT QL REPORTED: YES
GFR SERPL CREATININE-BSD FRML MDRD: >90 ML/MIN/1.73M2
GGT SERPL-CCNC: 41 U/L (ref 0–75)
GLUCOSE BLD-MCNC: 129 MG/DL (ref 70–99)
GLUCOSE BLD-MCNC: 190 MG/DL (ref 70–99)
GLUCOSE BLD-MCNC: 196 MG/DL (ref 70–99)
GLUCOSE BLD-MCNC: 36 MG/DL (ref 70–99)
GLUCOSE BLD-MCNC: 91 MG/DL (ref 70–99)
GLUCOSE BLD-MCNC: 93 MG/DL (ref 70–99)
GLUCOSE BLDC GLUCOMTR-MCNC: 100 MG/DL (ref 70–99)
GLUCOSE BLDC GLUCOMTR-MCNC: 182 MG/DL (ref 70–99)
GLUCOSE BLDC GLUCOMTR-MCNC: 26 MG/DL (ref 70–99)
GLUCOSE BLDC GLUCOMTR-MCNC: 29 MG/DL (ref 70–99)
GLUCOSE BLDC GLUCOMTR-MCNC: 41 MG/DL (ref 70–99)
GLUCOSE BLDC GLUCOMTR-MCNC: 63 MG/DL (ref 70–99)
GLUCOSE UR STRIP-MCNC: NEGATIVE MG/DL
HBA1C MFR BLD: 5.2 % (ref 0–5.6)
HCT VFR BLD AUTO: 43.3 % (ref 40–53)
HDLC SERPL-MCNC: 29 MG/DL
HGB BLD-MCNC: 14.7 G/DL (ref 13.3–17.7)
HGB UR QL STRIP: NEGATIVE
IGG SERPL-MCNC: 999 MG/DL (ref 610–1616)
IMM GRANULOCYTES # BLD: 0 10E3/UL
IMM GRANULOCYTES NFR BLD: 0 %
INR PPP: 1.05 (ref 0.85–1.15)
INSULIN SERPL-ACNC: 13.8 MU/L (ref 3–25)
INSULIN SERPL-ACNC: 23.7 MU/L (ref 3–25)
INSULIN SERPL-ACNC: 3 MU/L (ref 3–25)
INSULIN SERPL-ACNC: 69.6 MU/L (ref 3–25)
INSULIN SERPL-ACNC: 88.3 MU/L (ref 3–25)
IRON SERPL-MCNC: 119 UG/DL (ref 35–180)
KETONES UR STRIP-MCNC: NEGATIVE MG/DL
LDLC SERPL CALC-MCNC: 56 MG/DL
LEUKOCYTE ESTERASE UR QL STRIP: NEGATIVE
LYMPHOCYTES # BLD AUTO: 2.4 10E3/UL (ref 0.8–5.3)
LYMPHOCYTES NFR BLD AUTO: 41 %
MAGNESIUM SERPL-MCNC: 2.2 MG/DL (ref 1.6–2.3)
MCH RBC QN AUTO: 31.3 PG (ref 26.5–33)
MCHC RBC AUTO-ENTMCNC: 33.9 G/DL (ref 31.5–36.5)
MCV RBC AUTO: 92 FL (ref 78–100)
MICROALBUMIN UR-MCNC: 6 MG/L
MICROALBUMIN/CREAT UR: 5.83 MG/G CR (ref 0–17)
MONOCYTES # BLD AUTO: 0.5 10E3/UL (ref 0–1.3)
MONOCYTES NFR BLD AUTO: 8 %
MUCOUS THREADS #/AREA URNS LPF: PRESENT /LPF
NEUTROPHILS # BLD AUTO: 2.8 10E3/UL (ref 1.6–8.3)
NEUTROPHILS NFR BLD AUTO: 49 %
NITRATE UR QL: NEGATIVE
NONHDLC SERPL-MCNC: 94 MG/DL
NRBC # BLD AUTO: 0 10E3/UL
NRBC BLD AUTO-RTO: 0 /100
PH UR STRIP: 5 [PH] (ref 5–7)
PHOSPHATE SERPL-MCNC: 3.4 MG/DL (ref 2.5–4.5)
PLATELET # BLD AUTO: 432 10E3/UL (ref 150–450)
POTASSIUM BLD-SCNC: 4.1 MMOL/L (ref 3.4–5.3)
PROT SERPL-MCNC: 7.8 G/DL (ref 6.8–8.8)
RBC # BLD AUTO: 4.69 10E6/UL (ref 4.4–5.9)
RBC URINE: <1 /HPF
SODIUM SERPL-SCNC: 139 MMOL/L (ref 133–144)
SP GR UR STRIP: 1.02 (ref 1–1.03)
TRIGL SERPL-MCNC: 190 MG/DL
TSH SERPL DL<=0.005 MIU/L-ACNC: 3.3 MU/L (ref 0.4–4)
UROBILINOGEN UR STRIP-MCNC: NORMAL MG/DL
WBC # BLD AUTO: 5.8 10E3/UL (ref 4–11)
WBC URINE: <1 /HPF

## 2021-12-07 PROCEDURE — 84450 TRANSFERASE (AST) (SGOT): CPT

## 2021-12-07 PROCEDURE — 71046 X-RAY EXAM CHEST 2 VIEWS: CPT | Performed by: RADIOLOGY

## 2021-12-07 PROCEDURE — 80069 RENAL FUNCTION PANEL: CPT | Performed by: PHYSICIAN ASSISTANT

## 2021-12-07 PROCEDURE — 87077 CULTURE AEROBIC IDENTIFY: CPT | Performed by: PHYSICIAN ASSISTANT

## 2021-12-07 PROCEDURE — 82306 VITAMIN D 25 HYDROXY: CPT

## 2021-12-07 PROCEDURE — 83540 ASSAY OF IRON: CPT

## 2021-12-07 PROCEDURE — 82977 ASSAY OF GGT: CPT

## 2021-12-07 PROCEDURE — 82248 BILIRUBIN DIRECT: CPT

## 2021-12-07 PROCEDURE — 83036 HEMOGLOBIN GLYCOSYLATED A1C: CPT

## 2021-12-07 PROCEDURE — 84403 ASSAY OF TOTAL TESTOSTERONE: CPT

## 2021-12-07 PROCEDURE — 85025 COMPLETE CBC W/AUTO DIFF WBC: CPT

## 2021-12-07 PROCEDURE — 82040 ASSAY OF SERUM ALBUMIN: CPT

## 2021-12-07 PROCEDURE — 82947 ASSAY GLUCOSE BLOOD QUANT: CPT | Mod: 91

## 2021-12-07 PROCEDURE — 250N000009 HC RX 250: Performed by: PHYSICIAN ASSISTANT

## 2021-12-07 PROCEDURE — G0463 HOSPITAL OUTPT CLINIC VISIT: HCPCS

## 2021-12-07 PROCEDURE — 82550 ASSAY OF CK (CPK): CPT

## 2021-12-07 PROCEDURE — 82043 UR ALBUMIN QUANTITATIVE: CPT

## 2021-12-07 PROCEDURE — 77080 DXA BONE DENSITY AXIAL: CPT | Performed by: INTERNAL MEDICINE

## 2021-12-07 PROCEDURE — 84460 ALANINE AMINO (ALT) (SGPT): CPT

## 2021-12-07 PROCEDURE — 85610 PROTHROMBIN TIME: CPT

## 2021-12-07 PROCEDURE — 84155 ASSAY OF PROTEIN SERUM: CPT

## 2021-12-07 PROCEDURE — 84100 ASSAY OF PHOSPHORUS: CPT

## 2021-12-07 PROCEDURE — 36592 COLLECT BLOOD FROM PICC: CPT

## 2021-12-07 PROCEDURE — G0008 ADMIN INFLUENZA VIRUS VAC: HCPCS | Performed by: PHYSICIAN ASSISTANT

## 2021-12-07 PROCEDURE — 81001 URINALYSIS AUTO W/SCOPE: CPT

## 2021-12-07 PROCEDURE — 83525 ASSAY OF INSULIN: CPT | Mod: 91 | Performed by: PHYSICIAN ASSISTANT

## 2021-12-07 PROCEDURE — 99214 OFFICE O/P EST MOD 30 MIN: CPT | Performed by: PHYSICIAN ASSISTANT

## 2021-12-07 PROCEDURE — 36592 COLLECT BLOOD FROM PICC: CPT | Performed by: PHYSICIAN ASSISTANT

## 2021-12-07 PROCEDURE — 84590 ASSAY OF VITAMIN A: CPT

## 2021-12-07 PROCEDURE — 84075 ASSAY ALKALINE PHOSPHATASE: CPT

## 2021-12-07 PROCEDURE — 84446 ASSAY OF VITAMIN E: CPT

## 2021-12-07 PROCEDURE — 83735 ASSAY OF MAGNESIUM: CPT

## 2021-12-07 PROCEDURE — 250N000011 HC RX IP 250 OP 636: Performed by: PHYSICIAN ASSISTANT

## 2021-12-07 PROCEDURE — 82962 GLUCOSE BLOOD TEST: CPT

## 2021-12-07 PROCEDURE — 82784 ASSAY IGA/IGD/IGG/IGM EACH: CPT

## 2021-12-07 PROCEDURE — 90686 IIV4 VACC NO PRSV 0.5 ML IM: CPT | Performed by: PHYSICIAN ASSISTANT

## 2021-12-07 PROCEDURE — 82785 ASSAY OF IGE: CPT

## 2021-12-07 PROCEDURE — 85652 RBC SED RATE AUTOMATED: CPT

## 2021-12-07 PROCEDURE — 82947 ASSAY GLUCOSE BLOOD QUANT: CPT | Mod: 91 | Performed by: PHYSICIAN ASSISTANT

## 2021-12-07 PROCEDURE — 80061 LIPID PANEL: CPT

## 2021-12-07 PROCEDURE — 84443 ASSAY THYROID STIM HORMONE: CPT

## 2021-12-07 RX ADMIN — ALCOHOL 75 G: 65 GEL TOPICAL at 09:31

## 2021-12-07 RX ADMIN — INFLUENZA A VIRUS A/GUANGDONG-MAONAN/SWL1536/2019 CNIC-1909 (H1N1) ANTIGEN (FORMALDEHYDE INACTIVATED), INFLUENZA A VIRUS A/HONG KONG/2671/2019 (H3N2) ANTIGEN (FORMALDEHYDE INACTIVATED), INFLUENZA B VIRUS B/PHUKET/3073/2013 ANTIGEN (FORMALDEHYDE INACTIVATED), AND INFLUENZA B VIRUS B/WASHINGTON/02/2019 ANTIGEN (FORMALDEHYDE INACTIVATED) 0.5 ML: 15; 15; 15; 15 INJECTION, SUSPENSION INTRAMUSCULAR at 14:04

## 2021-12-07 ASSESSMENT — MIFFLIN-ST. JEOR: SCORE: 1659.9

## 2021-12-07 ASSESSMENT — PAIN SCALES - GENERAL: PAINLEVEL: NO PAIN (0)

## 2021-12-07 NOTE — PROGRESS NOTES
Nursing Note  Keon Conway presents today to [facility] for:   Chief Complaint   Patient presents with     Other     OGTT     During today's [facility] appointment, orders from Nita Cedeño PA-C were completed.  Frequency: once    Progress note:  Patient identification verified by name and date of birth.  Assessment completed.  Vitals recorded in Doc Flowsheets.  Patient was provided with education regarding medication/procedure and possible side effects.  Patient verbalized understanding.     present during visit today: Not Applicable.    Treatment Conditions: Patient confirms NPO status for 8 hours prior to test.  Patient drank 75 grams glucola at 0732 within ten minutes.     Labs: labs drawn at time 0, +30, +60, +90, +120. Annual labs also drawn prior to start of test.    Vascular access: peripheral IV placed today.    Is the next appt scheduled? NA   Asymptomatic COVID test completed? No    Post Infusion Assessment:  At completion of test (2 hours after test start time) blood glucose level of 26; pt asymptomatic. Juice, pop, and maddison crackers given. Charge RN and paramedic in to assess patient. Blood sugar up to 100 after snacks and prior to discharge from [facility]. See BG trends in chart. Patient alert and oriented with stable vitals throughout. Patient discharged from [facility] ambulatory to imaging appointments with more snacks and PIV still in place, accompanied by paramedic. Patient will follow-up with ROSEANNA Mae, this afternoon after imaging appointments.    Discharge Plan:   Follow up plan of care with: pt discharged to imaging appointments, accompanied by paramedic with follow-up with ROSEANNA Mae at 1330. AVS to mychart.  Discharge instructions were reviewed with patient.  Patient/representative verbalized understanding of discharge instructions and all questions answered.  [facility] discharged [facility] in stable condition.    Carla Turner RN.       Administrations This Visit     glucose tolerence test (GLUCOLA) 25% oral liquid 75 g     Admin Date  12/07/2021 Action  Given Dose  75 g Route  Oral Administered By  Carla Turner, RN                /86   Pulse 93   Temp 98  F (36.7  C) (Oral)   Resp 16   SpO2 100%

## 2021-12-07 NOTE — LETTER
12/7/2021     RE: Keon Conway  [address]  [facility] [address]    Dear Colleague,    Thank you for referring your patient, Keon Conway, to the [facility] [place]. Please see a copy of my visit note below.    [facility]  December 7, 2021         Assessment and Plan:   Keon Conway is a 21 year old male with cystic fibrosis who is seen today in clinic for routine follow up.     1. CF lung disease: no new pulmonary complaints, good exercise endurance, working 30-40 hours per week over the winter. Sating 98% on room air; vesting BID. PFTs were not scheduled today and he was unable to do them, prior PFTs in August were at his best. 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 No evidence of exacerbation.  - Continue current nebulizers and vest therapy  - Alternating [place] and noemi nebs  - Chronic azithromycin 500 mg three times/week     2. 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 Hepatic panel and CK today WNL.   - Continue Trikafta     3. Reactive hypoglycemia: AIC today of 5.2 with fasting BS < 100, but significant post hypoglycemia down to 26, mild symptoms. Per patient, he has symptoms of low BS 2-3 times/week, then has a snack. Is not routinely checking BS, BHARATH Restrepo to see today.   - Encouraged patient to check BS periodically, discussed frequent small meals  - Will have his f/u with Dr. Castro and Mindy--could benefit from continuous glucose montior     4. Exocrine pancreatic insufficiency: with h/o malnutrition s/p G tube placement. Tube fell out and patient has been able to maintain his weight. BMI slight < CFF goal.   - Continue pancreatic enzymes and vitamin supplementation     5. Hypertriglyceridemia: elevated since 2005, level today was fasting and remains elevated.     6. HCM: reviewed with  the patient including normal CBC, CMP, low cholesterol, low HDL, normal iron, elevated TGs per above, normal TSH, low vitamin D. CXR reviewed and demonstrates improved aeration and changes of CF. DEXA reviewed with Z scores WNL.      RTC: 3 months  Annual: December 2022  Vaccinations: discussed covid vaccine and he is not willing to get that at this time; did get in the influenza vaccine today    Nita Cedeño PA-C  Pulmonary, Allergy, Critical Care and Sleep Medicine        Interval History:     Working about 30-40 hours per week, always moving, doing some ice fishing. Minimal cough, no tightness or shortness of breath. Vesting BID. No new GI complaints, stools are 1-2 per day, not fatty or floating.          Review of Systems:   Please see HPI. Otherwise, complete 10 point ROS negative.           Past Medical and Surgical History:     Past Medical History:   Diagnosis Date     CF (cystic fibrosis) (H) 11/30/2011     Chronic maxillary sinusitis 11/30/2011     Exocrine pancreatic insufficiency 11/30/2011     Past Surgical History:   Procedure Laterality Date     BRONCHOSCOPY (RIGID OR FLEXIBLE), DIAGNOSTIC N/A 10/2/2017    Procedure: COMBINED BRONCHOSCOPY (RIGID OR FLEXIBLE), LAVAGE;  Flexible Bronchoscopy With Lavage, PICC Line Placement ;  Surgeon: Darrel Dudley MD;  Location: [facility] OR     CATARACT IOL, RT/LT Left      EXAM UNDER ANESTHESIA EYE(S) Left 3/17/2015    Procedure: EXAM UNDER ANESTHESIA EYE(S);  Surgeon: Aamir Taylor MD;  Location: [facility] OR     HERNIA REPAIR  December 2014     INSERT PICC LINE Right 10/2/2017    Procedure: INSERT PICC LINE;;  Surgeon: Angeles iSngh MD;  [facility] OR     LAPAROSCOPIC ASSISTED INSERTION TUBE GASTROTOMY N/A 10/16/2017    Procedure: LAPAROSCOPIC ASSISTED INSERTION TUBE GASTROSTOMY;  Laparoscopic Gastrostomy Tube Placement.;  Surgeon: Jeremy Obando MD;  [facility] OR     SCRUB ETHYLENEDIAMENETETRAACETIC (EDTA) Left 3/17/2015    Procedure: SCRUB  ETHYLENEDIAMENETETRAACETIC (EDTA);  Surgeon: Aamir Taylor MD;  [facility] OR     SINUS SURGERY  3/2011           Family History:     Family History   Problem Relation Age of Onset     Diabetes Paternal Grandfather             Social History:     Social History     Socioeconomic History     Marital status: Single     Spouse name: Not on file     Number of children: Not on file     Years of education: Not on file     Highest education level: Not on file   Occupational History     Not on file   Tobacco Use     Smoking status: Never Smoker     Smokeless tobacco: Never Used   Substance and Sexual Activity     Alcohol use: No     Drug use: No     Sexual activity: Not on file   Other Topics Concern     Parent/sibling w/ CABG, MI or angioplasty before 65F 55M? Not Asked   Social History Narrative    Mom is 35, Dad 39, both healthy.     Social Determinants of Health     Financial Resource Strain: Not on file   Food Insecurity: Not on file   Transportation Needs: Not on file   Physical Activity: Not on file   Stress: Not on file   Social Connections: Not on file   Intimate Partner Violence: Not on file   Housing Stability: Not on file            Medications:     Current Outpatient Medications   Medication     albuterol (VENTOLIN HFA) 108 (90 Base) MCG/ACT inhaler     amylase-lipase-protease (CREON) 67665-81840 units CPEP per EC capsule     azithromycin (ZITHROMAX) 500 MG tablet     aztreonam lysine (CAYSTON) 75 MG SOLR     blood glucose (ACCU-CHEK GUIDE) test strip     blood glucose monitoring (ACCU-CHEK FASTCLIX) lancets     dornase alpha (PULMOZYME) 1 MG/ML neb solution     elexacaftor-tezacaftor-ivacaftor & ivacaftor (TRIKAFTA) 100-50-75 & 150 MG tablet pack     ipratropium - albuterol 0.5 mg/2.5 mg/3 mL (DUONEB) 0.5-2.5 (3) MG/3ML neb solution     mvw complete formulation (SOFTGELS ) capsule     JOSE ALTERA NEBULIZER SYSTEM MISC     polyethylene glycol (MIRALAX) powder     Respiratory Therapy Supplies (JOSE  "ALTERA NEBULIZER HANDSET) MISC     sodium chloride inhalant (HYPERSAL) 7 % NEBU neb solution     tobramycin, PF, (ANMRATA) 300 MG/5ML neb solution     No current facility-administered medications for this visit.            Physical Exam:   /74   Pulse 87   Ht 1.727 m (5' 8\")   Wt 68 kg (150 lb)   SpO2 98%   BMI 22.81 kg/m      GENERAL: alert, NAD  HEENT: NCAT, EOMI, anicteric sclera, canals and TMs clear; no oral mucosal edema or erythema  Neck: no cervical or supraclavicular adenopathy  Respiratory: good air flow, mainly clear  CV: RRR, S1S2, no murmurs noted  Abdomen: normoactive BS, soft   Lymph: no edema, + digital clubbing  Neuro: AAO X 3, CN 2-12 grossly intact  Psychiatric: normal affect, good eye contact  Skin: no rash, jaundice or lesions on limited exam         Data:   All laboratory and imaging data reviewed.      Cystic Fibrosis Culture  Specimen Description   Date Value Ref Range Status   04/05/2021 Throat  Final   12/07/2020 Throat  Final   02/03/2020 Throat  Final    Culture Micro   Date Value Ref Range Status   04/05/2021 Moderate growth  Normal branden    Final   12/07/2020 Moderate growth  Normal branden    Final   02/03/2020 Canceled, Test credited  Incorrect specimen type    Final   02/03/2020   Final    Notification of test cancellation was given to  Mindy in [facility]. They do not want a yeast culture instead. 2.3.20 at 1109 bw          PFT interpretation:  Maneuver: valid and meets ATS guidelines  Normal spirometry        Nutrition Note    Pt completed OGTT today and had a significant episode of reactive hypoglycemia prior to finishing the test.  Pt is known to have impaired glucose tolerance and has seen our CF endocrine team within the past 2 years.  Currently owns a glucometer and is aware of the impact of diet/carbohydrate intake on blood sugar level.  He has not been checking his blood sugars regularly at home, only when he feels like he is having an episode of hypoglycemia.  These are " somewhat infrequent per pt.    Review education provided on recommended dietary changes to prevent reactive hypoglycemia and also to treat episodes of low blood sugar.  Pt demonstrated a good understanding of information discussed and plans to follow up with endocrine as soon as he is able.     Kaye Stark MS, RD, LD (pager [phone])  Cystic Fibrosis/Lung Transplant Dietitian      -Available Mon-Thurs 8 AM-4:30 PM. On Fridays please contact pager [phone] (Willow Mendes RD) and on weekends/holidays contact coverage dietitian at pager [phone] (inpatient use only). '    Again, thank you for allowing me to participate in the care of your patient.      Sincerely,    Nita Cedeño PA-C

## 2021-12-07 NOTE — PROGRESS NOTES
Patient evaluated in clinic by Nita Cedeño PA-C. Patient had OGTT today as part of annual studies. Blood glucose dropped to 26. Per chart review, patient previously seen by Dr. Castro in 2019 from reactive hypoglycemia. Patient reports having glucometer at home and checks when symptomatic. Does not report feeling symptomatic with low sugars today. RN reached out to Dr. Castro's nurse Mindy Pedro with request to follow up with patient after OGTT today.    Agatha Reardon RN

## 2021-12-07 NOTE — NURSING NOTE
Chief Complaint   Patient presents with     Follow Up     3 mon th CF Follow      Vitals were taken and medications were reconciled.     Etelvina Daily RMA  1:29 PM

## 2021-12-07 NOTE — NURSING NOTE
Patient had OGTT performed in clinic today. Peripheral IV in the right AC still intact. RN removed IV in clinic. No signs IV infiltrated or infection--clean, dry, intact, and no swelling, bleeding, drainage, redness, or warmth to touch. Patient tolerated well.     Agatha Reardon RN

## 2021-12-07 NOTE — PATIENT INSTRUCTIONS
Cystic Fibrosis Self-Care Plan       Patient: Keon Conway   MRN: [id number]   Clinic Date: December 7, 2021     RECOMMENDATIONS:  1. Continue nebulizers and vest therapy.   2. Please consider getting the covid vaccine; you will need to wait two weeks from today since you got your flu shot.   3. Continue to check your blood sugars with symptoms and periodically during the day. You should focus on frequent small meals and snacks.     Annual Studies:   IGG   Date Value Ref Range Status   12/07/2020 979 610 - 1,616 mg/dL Final     Immunoglobulin G   Date Value Ref Range Status   12/07/2021 999 610-1,616 mg/dL Final     Insulin   Date Value Ref Range Status   12/07/2021 3.0 3.0 - 25.0 mU/L Final   05/08/2019 5.7 3 - 25 mU/L Final     There are no preventive care reminders to display for this patient.    Pulmonary Function Tests  FEV1: amount of air you can blow out in 1 second  FVC: total amount of air you can take in and blow out    Your Goals:         PFT Latest Ref Rng & Units 8/12/2021   FVC L 5.15   FEV1 L 4.38   FVC% % 100   FEV1% % 100          Airway Clearance: The Most Important Way to Keep Your Lungs Healthy  Vest Settings:    Hill-Rom Frequencies: 8, 9, 10 Pressure 10 Then, Frequencies 18, 19, 20 Pressure 6      RespirTech: Quick Start with Pressure of     Do each frequency for 5 minutes; Deflate vest after each frequency & cough 3 times before beginning the next setting.    Vest and Neb Therapy should be done 2 times/day.    Good Nutrition Can Improve Lung Function and Overall Health     Take ALL of your vitamins with food     Take 1/2 of your enzymes before EVERY meal/snack and the other 1/2 mid-meal/snack    Wt Readings from Last 3 Encounters:   12/07/21 68 kg (150 lb)   08/12/21 69.9 kg (154 lb 1.6 oz)   04/05/21 69.6 kg (153 lb 7 oz)       Body mass index is 22.81 kg/m .         National CF Foundation Recommendations for BMI in CF Adults: Women: at least 22 Men: at least 23        Controlling  Blood Sugars Helps Prevent Lung Infections & Improves Nutrition  Test blood sugar:     In the morning before eating (goal is )     2 hours after a meal (goal is less than 150)     When pre-meal glucose is greater than 150 add correction     At bedtime (if less than 100 eat a snack with 15 grams of carbohydrates  Last A1C Results:   Hemoglobin A1C   Date Value Ref Range Status   12/07/2021 5.2 0.0 - 5.6 % Final     Comment:     Normal <5.7%   Prediabetes 5.7-6.4%    Diabetes 6.5% or higher     Note: Adopted from ADA consensus guidelines.   12/07/2020 5.1 0 - 5.6 % Final     Comment:     Normal <5.7% Prediabetes 5.7-6.4%  Diabetes 6.5% or higher - adopted from ADA   consensus guidelines.           If diabetic, measure A1C every 6 months. Goal is under 7%.    Staying Healthy    Research: If you are interested in learning about research opportunities or have questions, please contact Lily Daniels at [phone] or [email or web address].      CF Foundation: Compass is a personalized resource service to help you with the insurance, financial, legal and other issues you are facing.  It's free, confidential and available to anyone with CF.  Ask your  for more information or contact Compass directly at [facility] ([phone]) or compass@cff.org, or learn more at cff.org/compass.       CF Nurse Line: Rodney Ken: [phone]   Marcelina Sales, RT: [phone]     Kaye Mendes , Dieticians: [phone]     Mindy Pedro, Diabetes Nurse: [phone]    Felicia Garcia: [phone] or Lakisha Lay at [phone], Social Workers   [email or web address]

## 2021-12-07 NOTE — LETTER
12/7/2021         RE: Keon Conway  [address]  [facility] [address]        Dear Colleague,    Thank you for referring your patient, Keon Conway, to the [facility]. Please see a copy of my visit note below.    Nursing Note  Keon Conway [facility]:   Chief Complaint   Patient presents with     Other     OGTT     During today's [facility] appointment, orders from Nita Cedeño PA-C were completed.  Frequency: once    Progress note:  Patient identification verified by name and date of birth.  Assessment completed.  Vitals recorded in Doc Flowsheets.  Patient was provided with education regarding medication/procedure and possible side effects.  Patient verbalized understanding.     present during visit today: Not Applicable.    Treatment Conditions: Patient confirms NPO status for 8 hours prior to test.  Patient drank 75 grams glucola at 0732 within ten minutes.     Labs: labs drawn at time 0, +30, +60, +90, +120. Annual labs also drawn prior to start of test.    Vascular access: peripheral IV placed today.    Is the next appt scheduled? NA   Asymptomatic COVID test completed? No    Post Infusion Assessment:  At completion of test (2 hours after test start time) blood glucose level of 26; pt asymptomatic. Juice, pop, and maddison crackers given. Charge RN and paramedic in to assess patient. Blood sugar up to 100 after snacks and prior to discharge from [facility]. See BG trends in chart. Patient alert and oriented with stable vitals throughout. Patient discharged from [facility] ambulatory to imaging appointments with more snacks and PIV still in place, accompanied by paramedic. Patient will follow-up with ROSEANNA Mae, this afternoon after imaging appointments.    Discharge Plan:   Follow up plan of care with: pt discharged to imaging appointments, accompanied by  paramedic with follow-[facility] 1330[facility]  Discharge instructions were reviewed with patient.  Patient/representative verbalized understanding of discharge instructions and all questions answered.  Patient discharged [facility] in stable condition.    Carla Turner RN.       Administrations This Visit     glucose tolerence test (GLUCOLA) 25% oral liquid 75 g     Admin Date  12/07/2021 Action  Given Dose  75 g Route  Oral Administered By  Carla Turner RN                /86   Pulse 93   Temp 98  F (36.7  C) (Oral)   Resp 16   SpO2 100%         Again, thank you for allowing me to participate in the care of your patient.        Sincerely,        [facility]

## 2021-12-07 NOTE — PATIENT INSTRUCTIONS
Dear Keon Conawy    Thank you for choosing [facility] ([facility]) for your Glucose Tolerance Test.  The following information is a summary of our appointment as well as important reminders.      Patient Education     Glucose Tolerance  Does this test have other names?  Oral glucose tolerance test, OGTT, GTT   What is this test?  An oral glucose tolerance test (OGTT) is used to screen for diabetes or prediabetes. To start the test, your healthcare provider will draw your blood to check your blood sugar (glucose) level. Then you will drink a liquid with a lot of glucose. Your healthcare provider will draw a sample of your blood every hour for the next 2 hours to check your blood glucose. This will help assess your risk for prediabetes, diabetes, or gestational diabetes. It will also help diagnose diabetes.   Why do I need this test?  The test is often a first step in diagnosing prediabetes, diabetes, or gestational diabetes.   You may need this test if you have symptoms of diabetes. These include:    Increased thirst    Increased urination    Unexplained weight loss    Blurred vision    Tiredness    Sores that don't heal  You may also need this test if you have risk factors for diabetes. These include:    Overweight or obesity    Physical inactivity    High blood pressure    High cholesterol    Family history of diabetes  The American Diabetes Association (ADA) advises that adults of any age who are obese or overweight and have one or more risk factors. All adults should be tested for diabetes every 3 years starting at age 45. If a person has prediabetes, testing should happen yearly. Adults with diabetes should have their blood tested much more often.   What other tests might I have along with this test?  You may need tests to diagnose diabetes or watch your blood glucose levels. These tests include:     Blood glucose testing    A1C blood test  Heart  health is closely tied with diabetes. Because of this, you will need regular tests of your:     Blood pressure    Cholesterol    Triglycerides  What do my test results mean?  Test results may vary depending on your age, gender, health history, the method used for the test, and other things. Your test results may not mean you have a problem. Ask your healthcare provider what your test results mean for you.   This test can be done several ways. For a common type of glucose tolerance test, a normal glucose level is less than 140 milligrams per deciliter (mg/dL) 2 hours after you drink the glucose fluid. If your level is between 140 to 199 mg/d, you may have prediabetes. If it's 200 mg/dL or above, you might have diabetes, and need more tests. During pregnancy, a result above 153 mg/dL after 2 hours means you may have gestational diabetes. This is a type of diabetes that happens with pregnancy.   If you have high blood glucose, your healthcare provider may advise lifestyle changes. These will include changing your diet and getting more exercise. Your healthcare provider may also prescribe medicine to help manage your blood sugar levels.   How is this test done?  This test is done at an outpatient medical facility or lab. A healthcare provider will put an IV into a vein in your arm. This allows the staff to take multiple blood samples more easily.   For a common type of glucose tolerance test, the first blood sample will be drawn and your blood glucose level will be checked. Then you will be asked to drink a liquid that has a lot of glucose. It will contain about 75 grams of sugar dissolved in water.   After that, your blood will be drawn every hour for the next 2 hours. Each sample will be checked to measure the levels of glucose.   Does this test pose any risks?  Having a blood test with a needle carries some risks. These include bleeding, infection, bruising, and feeling lightheaded. When the needle pricks your arm or  hand, you may feel a slight sting or pain. Afterward, the site may be sore.   After drinking the sweet liquid used for this test, you may feel nauseated. You may have an upset stomach or headache. These side effects should go away after the test.   What might affect my test results?  A number of factors can affect blood glucose levels. Follow your healthcare provider's instructions on how to prepare for the test.   How do I get ready for this test?  Follow your healthcare provider's instructions about not eating or drinking before the test. Don't smoke, chew gum, or exercise other than light walking the day before and the morning of the test.   Be sure your healthcare provider knows about all medicines, herbs, vitamins, and supplements you are taking. This includes medicines that don't need a prescription and any illegal drugs you may use.   [employer] ([employer]) last reviewed this educational content on 10/1/2020    3231-9999 [employer]. All rights reserved. This information is not intended as a substitute for professional medical care. Always follow your healthcare professional's instructions.             We look forward in seeing you on your next appointment here at [facility] ([facility]).  Please don t hesitate to call us at [phone] to reschedule any of your appointments or to speak with one of the [facility] registered nurses.  It was a pleasure taking care of you today.    Sincerely,    [facility]  [facility]  [address] [place] [address]  [id number]  Phone:  [phone]

## 2021-12-08 LAB — IGE SERPL-ACNC: 22 KU/L (ref 0–114)

## 2021-12-09 LAB — TESTOST SERPL-MCNC: 422 NG/DL (ref 240–950)

## 2021-12-09 NOTE — PROGRESS NOTES
Nutrition Note    Pt completed OGTT today and had a significant episode of reactive hypoglycemia prior to finishing the test.  Pt is known to have impaired glucose tolerance and has seen our CF endocrine team within the past 2 years.  Currently owns a glucometer and is aware of the impact of diet/carbohydrate intake on blood sugar level.  He has not been checking his blood sugars regularly at home, only when he feels like he is having an episode of hypoglycemia.  These are somewhat infrequent per pt.    Review education provided on recommended dietary changes to prevent reactive hypoglycemia and also to treat episodes of low blood sugar.  Pt demonstrated a good understanding of information discussed and plans to follow up with endocrine as soon as he is able.     Kaye Stark MS, RD, LD (pager [phone])  Cystic Fibrosis/Lung Transplant Dietitian      -Available Mon-Thurs 8 AM-4:30 PM. On Fridays please contact pager [phone] (Willow Mendes RD) and on weekends/holidays contact coverage dietitian at pager [phone] (inpatient use only). '

## 2021-12-10 LAB
A-TOCOPHEROL VIT E SERPL-MCNC: 7.4 MG/L
ANNOTATION COMMENT IMP: NORMAL
BETA+GAMMA TOCOPHEROL SERPL-MCNC: 1.4 MG/L
RETINYL PALMITATE SERPL-MCNC: 0.02 MG/L
VIT A SERPL-MCNC: 0.65 MG/L

## 2021-12-12 LAB
BACTERIA SPEC CULT: ABNORMAL
BACTERIA SPEC CULT: ABNORMAL

## 2021-12-20 ENCOUNTER — CLINICAL UPDATE (OUTPATIENT)
Dept: PHARMACY | Facility: CLINIC | Age: 21
End: 2021-12-20
Payer: COMMERCIAL

## 2021-12-20 DIAGNOSIS — E84.9 CF (CYSTIC FIBROSIS) (H): Primary | ICD-10-CM

## 2021-12-20 PROCEDURE — 99207 PR NO CHARGE LOS: CPT | Performed by: PHARMACIST

## 2021-12-20 NOTE — PROGRESS NOTES
Clinical Pharmacy Consult:                                                    A chart review was conducted for Keon Conway.      Reason for Consult: Trikafta Lab Monitoring    Discussion: Keon has been on Trikafta since November 2019.     Labs were reviewed from 12/07/21 at [facility]. Hepatic panel and CK are within normal limits.     Lab Results   Component Value Date    ALT 35 12/07/2021    AST 24 12/07/2021    BILITOTAL 0.3 12/07/2021    DBIL <0.1 12/07/2021    CKT 69 12/07/2021       Plan:  1. Continue Trikafta.  2. Re-check hepatic panel and CK in 12 months with annual studies.    Dyan Reid, PharmD, MS.   Adult [facility] Pharmacist  [phone]

## 2022-01-08 ENCOUNTER — HEALTH MAINTENANCE LETTER (OUTPATIENT)
Age: 22
End: 2022-01-08

## 2022-01-19 DIAGNOSIS — E55.9 HYPOVITAMINOSIS D: ICD-10-CM

## 2022-01-19 DIAGNOSIS — E84.9 CF (CYSTIC FIBROSIS) (H): Primary | ICD-10-CM

## 2022-01-19 DIAGNOSIS — K86.81 EXOCRINE PANCREATIC INSUFFICIENCY: ICD-10-CM

## 2022-01-19 RX ORDER — PEDIATRIC MULTIVIT 61/D3/VIT K 1500-800
1 CAPSULE ORAL DAILY
Qty: 60 CAPSULE | Refills: 11 | Status: SHIPPED | OUTPATIENT
Start: 2022-01-19 | End: 2023-01-24

## 2022-02-07 DIAGNOSIS — E84.9 CF (CYSTIC FIBROSIS) (H): ICD-10-CM

## 2022-02-07 RX ORDER — IPRATROPIUM BROMIDE AND ALBUTEROL SULFATE 2.5; .5 MG/3ML; MG/3ML
SOLUTION RESPIRATORY (INHALATION)
Qty: 360 ML | Refills: 3 | Status: SHIPPED | OUTPATIENT
Start: 2022-02-07 | End: 2023-02-06

## 2022-02-07 RX ORDER — AZITHROMYCIN 500 MG/1
TABLET, FILM COATED ORAL
Qty: 6 TABLET | Refills: 11 | Status: SHIPPED | OUTPATIENT
Start: 2022-02-07 | End: 2023-04-03

## 2022-02-08 DIAGNOSIS — E84.0 CYSTIC FIBROSIS WITH PULMONARY MANIFESTATIONS (H): ICD-10-CM

## 2022-02-08 RX ORDER — SODIUM CHLORIDE FOR INHALATION 7 %
4 VIAL, NEBULIZER (ML) INHALATION 2 TIMES DAILY
Qty: 240 ML | Refills: 11 | Status: SHIPPED | OUTPATIENT
Start: 2022-02-08 | End: 2022-04-19

## 2022-02-26 NOTE — PROGRESS NOTES
[facility]  [facility]  March 1, 2022         Assessment and Plan:   Keon Conway is a 22 year old male with cystic fibrosis who is seen today in clinic for routine follow up.     1. CF lung disease: maybe a slight increase in cough, but no other changes in his lung function, especially since he tells me today he has not been able to get Trikafta since October. Sating 98% on room air; vesting BID. PFTs today down 20% from August 2021, suspect related to lack of Trikafta. 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   - Continue current nebulizers and vest therapy  - On noemi nebs month on/off  - Chronic azithromycin 500 mg three times/week     2. CFTR modulation: has not been on Trikafta since October, just telling us this today. Severe decline in pulmonary function per above. Will have Mirian FERRIS, see today.  - Hepatic panel and CK now  - Resume Trikafat     3. Reactive hypoglycemia: AIC of 5.2 12/7/21 with fasting BS < 100, but significant post hypoglycemia down to 26, mild symptoms. Checking BS with symptoms, maybe once per week, last BS was 82.  - Encouraged patient to check BS periodically, discussed frequent small meals  - Needs to schedule follow up with Dr. Castro     4. Exocrine pancreatic insufficiency: with h/o malnutrition s/p G tube placement. Tube fell out and patient has been able to maintain his weight. BMI > CFF goal. Vitamin D was low in December, started on MVI with vitamin D.  - Continue pancreatic enzymes and vitamin supplementation     5. Hypertriglyceridemia: elevated since 2005, level today was fasting and remains elevated.     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 No cough or shortness of breath, no recent illnesses. No sinus complaints, no new GI complaints. No nausea or bloating, stools are normal, one per day and sinking. Vesting BID.          Review of Systems:   Please see HPI. Otherwise, complete 10 point ROS negative.           Past Medical and Surgical History:     Past Medical History:   Diagnosis Date     CF (cystic fibrosis) (H) 11/30/2011     Chronic maxillary sinusitis 11/30/2011     Exocrine pancreatic insufficiency 11/30/2011     Past Surgical History:   Procedure Laterality Date     BRONCHOSCOPY (RIGID OR FLEXIBLE), DIAGNOSTIC N/A 10/2/2017    Procedure: COMBINED BRONCHOSCOPY (RIGID OR FLEXIBLE), LAVAGE;  Flexible Bronchoscopy With Lavage, PICC Line Placement ;  Surgeon: Darrel Dudley MD;  [facility]     CATARACT IOL, RT/LT Left      EXAM UNDER ANESTHESIA EYE(S) Left 3/17/2015    Procedure: EXAM UNDER ANESTHESIA EYE(S);  Surgeon: Aamir Taylor MD;  [facility]     HERNIA REPAIR  December 2014     INSERT PICC LINE Right 10/2/2017    Procedure: INSERT PICC LINE;;  Surgeon: Angeles Singh MD;  [facility]     LAPAROSCOPIC ASSISTED INSERTION TUBE GASTROTOMY N/A 10/16/2017    Procedure: LAPAROSCOPIC ASSISTED INSERTION TUBE GASTROSTOMY;  Laparoscopic Gastrostomy Tube Placement.;  Surgeon: Jeremy Obando MD;  [facility]     SCRUB ETHYLENEDIAMENETETRAACETIC (EDTA) Left 3/17/2015    Procedure: SCRUB ETHYLENEDIAMENETETRAACETIC (EDTA);  Surgeon: Aamir Taylor MD;  [facility]     SINUS SURGERY  3/2011           Family History:     Family History   Problem Relation Age of Onset     Diabetes Paternal Grandfather             Social History:     Social History     Socioeconomic History     Marital status: Single     Spouse name: Not on file     Number of children: Not on file     Years of education: Not on file     Highest education level: Not on  "file   Occupational History     Not on file   Tobacco Use     Smoking status: Never Smoker     Smokeless tobacco: Never Used   Substance and Sexual Activity     Alcohol use: No     Drug use: No     Sexual activity: Not on file   Other Topics Concern     Parent/sibling w/ CABG, MI or angioplasty before 65F 55M? Not Asked   Social History Narrative    Mom is 35, Dad 39, both healthy.     Social Determinants of Health     Financial Resource Strain: Not on file   Food Insecurity: Not on file   Transportation Needs: Not on file   Physical Activity: Not on file   Stress: Not on file   Social Connections: Not on file   Intimate Partner Violence: Not on file   Housing Stability: Not on file            Medications:     Current Outpatient Medications   Medication     albuterol (VENTOLIN HFA) 108 (90 Base) MCG/ACT inhaler     amylase-lipase-protease (CREON) 95685-62555 units CPEP per EC capsule     azithromycin (ZITHROMAX) 500 MG tablet     aztreonam lysine (CAYSTON) 75 MG SOLR     blood glucose (ACCU-CHEK GUIDE) test strip     blood glucose monitoring (ACCU-CHEK FASTCLIX) lancets     dornase alpha (PULMOZYME) 1 MG/ML neb solution     elexacaftor-tezacaftor-ivacaftor & ivacaftor (TRIKAFTA) 100-50-75 & 150 MG tablet pack     ipratropium - albuterol 0.5 mg/2.5 mg/3 mL (DUONEB) 0.5-2.5 (3) MG/3ML neb solution     mvw complete formulation (SOFTGELS ) capsule     JOSE ALTERA NEBULIZER SYSTEM MISC     polyethylene glycol (MIRALAX) powder     Respiratory Therapy Supplies (JOSE ALTERA NEBULIZER HANDSET) MISC     sodium chloride inhalant (HYPERSAL) 7 % NEBU neb solution     tobramycin, PF, (NAMRATA) 300 MG/5ML neb solution     No current facility-administered medications for this visit.            Physical Exam:   /78   Pulse 92   Resp 17   Ht 1.727 m (5' 8\")   Wt 69.4 kg (153 lb)   SpO2 98%   BMI 23.26 kg/m      GENERAL: alert, NAD  HEENT: NCAT, EOMI, anicteric sclera, no oral mucosal edema or erythema  Neck: no cervical " or supraclavicular adenopathy  Respiratory: good air flow, few scattered crackles bilaterally  CV: RRR, S1S2, no murmurs noted  Abdomen: normoactive BS, soft   Lymph: no edema, + digital clubbing  Neuro: AAO X 3, CN 2-12 grossly intact  Psychiatric: normal affect, good eye contact  Skin: no rash, jaundice or lesions on limited exam         Data:   All laboratory and imaging data reviewed.      Cystic Fibrosis Culture  Specimen Description   Date Value Ref Range Status   04/05/2021 Throat  Final   12/07/2020 Throat  Final   02/03/2020 Throat  Final    Culture Micro   Date Value Ref Range Status   04/05/2021 Moderate growth  Normal branden    Final   12/07/2020 Moderate growth  Normal branden    Final   02/03/2020 Canceled, Test credited  Incorrect specimen type    Final   02/03/2020   Final    Notification of test cancellation was given to  Mindy in [facility]. They do not want a yeast culture instead. 2.3.20 at 1109 bw          PFT interpretation:  Maneuver: valid and meets ATS guidelines  Normal ratio with decreased FEV1, but normal FVC  Compared to prior: FEV1 of 3.53 is 850 ml below prior

## 2022-03-01 ENCOUNTER — OFFICE VISIT (OUTPATIENT)
Dept: PHARMACY | Facility: CLINIC | Age: 22
End: 2022-03-01

## 2022-03-01 ENCOUNTER — OFFICE VISIT (OUTPATIENT)
Dept: PULMONOLOGY | Facility: CLINIC | Age: 22
End: 2022-03-01
Attending: PHYSICIAN ASSISTANT
Payer: COMMERCIAL

## 2022-03-01 ENCOUNTER — TELEPHONE (OUTPATIENT)
Dept: PULMONOLOGY | Facility: CLINIC | Age: 22
End: 2022-03-01

## 2022-03-01 ENCOUNTER — LAB (OUTPATIENT)
Dept: LAB | Facility: CLINIC | Age: 22
End: 2022-03-01
Payer: COMMERCIAL

## 2022-03-01 VITALS
WEIGHT: 153 LBS | BODY MASS INDEX: 23.19 KG/M2 | HEIGHT: 68 IN | OXYGEN SATURATION: 98 % | SYSTOLIC BLOOD PRESSURE: 129 MMHG | RESPIRATION RATE: 17 BRPM | DIASTOLIC BLOOD PRESSURE: 78 MMHG | HEART RATE: 92 BPM

## 2022-03-01 DIAGNOSIS — E84.9 CF (CYSTIC FIBROSIS) (H): Primary | ICD-10-CM

## 2022-03-01 DIAGNOSIS — E84.9 CF (CYSTIC FIBROSIS) (H): ICD-10-CM

## 2022-03-01 DIAGNOSIS — E84.9 CYSTIC FIBROSIS (H): ICD-10-CM

## 2022-03-01 LAB
ALBUMIN SERPL-MCNC: 4.1 G/DL (ref 3.4–5)
ALP SERPL-CCNC: 135 U/L (ref 40–150)
ALT SERPL W P-5'-P-CCNC: 28 U/L (ref 0–70)
AST SERPL W P-5'-P-CCNC: 18 U/L (ref 0–45)
BILIRUB DIRECT SERPL-MCNC: 0.1 MG/DL (ref 0–0.2)
BILIRUB SERPL-MCNC: 0.3 MG/DL (ref 0.2–1.3)
CK SERPL-CCNC: 60 U/L (ref 30–300)
EXPTIME-PRE: 7.5 SEC
FEF2575-%PRED-PRE: 60 %
FEF2575-PRE: 2.88 L/SEC
FEF2575-PRED: 4.77 L/SEC
FEFMAX-%PRED-PRE: 92 %
FEFMAX-PRE: 8.99 L/SEC
FEFMAX-PRED: 9.69 L/SEC
FEV1-%PRED-PRE: 80 %
FEV1-PRE: 3.53 L
FEV1FEV6-PRE: 77 %
FEV1FEV6-PRED: 84 %
FEV1FVC-PRE: 76 %
FEV1FVC-PRED: 85 %
FIFMAX-PRE: 7.93 L/SEC
FVC-%PRED-PRE: 89 %
FVC-PRE: 4.63 L
FVC-PRED: 5.16 L
PROT SERPL-MCNC: 7.9 G/DL (ref 6.8–8.8)

## 2022-03-01 PROCEDURE — 94375 RESPIRATORY FLOW VOLUME LOOP: CPT | Performed by: PHYSICIAN ASSISTANT

## 2022-03-01 PROCEDURE — 99207 PR NO CHARGE LOS: CPT | Performed by: PHARMACIST

## 2022-03-01 PROCEDURE — 36415 COLL VENOUS BLD VENIPUNCTURE: CPT | Performed by: PATHOLOGY

## 2022-03-01 PROCEDURE — 82550 ASSAY OF CK (CPK): CPT | Performed by: PATHOLOGY

## 2022-03-01 PROCEDURE — 99214 OFFICE O/P EST MOD 30 MIN: CPT | Mod: 25 | Performed by: PHYSICIAN ASSISTANT

## 2022-03-01 PROCEDURE — 87070 CULTURE OTHR SPECIMN AEROBIC: CPT | Performed by: PHYSICIAN ASSISTANT

## 2022-03-01 PROCEDURE — 80076 HEPATIC FUNCTION PANEL: CPT | Performed by: PATHOLOGY

## 2022-03-01 PROCEDURE — G0463 HOSPITAL OUTPT CLINIC VISIT: HCPCS | Mod: 25

## 2022-03-01 PROCEDURE — 87077 CULTURE AEROBIC IDENTIFY: CPT | Performed by: PHYSICIAN ASSISTANT

## 2022-03-01 RX ORDER — ELEXACAFTOR, TEZACAFTOR, AND IVACAFTOR 100-50-75
KIT ORAL
Qty: 84 TABLET | Refills: 5 | Status: SHIPPED | OUTPATIENT
Start: 2022-03-01 | End: 2022-07-28

## 2022-03-01 RX ORDER — TOBRAMYCIN INHALATION SOLUTION 300 MG/5ML
300 INHALANT RESPIRATORY (INHALATION) 2 TIMES DAILY
Qty: 280 ML | Refills: 6 | Status: SHIPPED | OUTPATIENT
Start: 2022-03-01 | End: 2023-04-03

## 2022-03-01 ASSESSMENT — PAIN SCALES - GENERAL: PAINLEVEL: NO PAIN (0)

## 2022-03-01 NOTE — TELEPHONE ENCOUNTER
Prior Authorization Approval    Authorization Effective Date: 3/1/2022  Authorization Expiration Date: 2/28/2023  Medication: Pulmozyme PA approved   Approved Dose/Quantity: 2.5mg / up to 150ml every 30 days   Reference #: Key: [id number] - PA Case ID: [id number]   Insurance Company: [employer] [phone] Fax [phone]  Expected CoPay:       CoPay Card Available:      Foundation Assistance Needed:    Which Pharmacy is filling the prescription (Not needed for infusion/clinic administered): [facility] (HOME DELIVERY - [address]  Pharmacy Notified:    Patient Notified:                          PA Initiation    Medication: Pulmozyme PA pending  Insurance Company: [employer] [phone] Fax [phone]  Pharmacy Filling the Rx:    Filling Pharmacy Phone:    Filling Pharmacy Fax:    Start Date: 3/1/2022

## 2022-03-01 NOTE — LETTER
3/1/2022     RE: Keon Conway  [address]  [place] [address]    Dear Colleague,    Thank you for referring your patient, Keon Conway, to the [facility] [place]. Please see a copy of my visit note below.    [facility]  March 1, 2022         Assessment and Plan:   Keon Conway is a 22 year old male with cystic fibrosis who is seen today in clinic for routine follow up.     1. CF lung disease: maybe a slight increase in cough, but no other changes in his lung function, especially since he tells me today he has not been able to get Trikafta since October. Sating 98% on room air; vesting BID. PFTs today down 20% from August 2021, suspect related to lack of Trikafta. 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   - Continue current nebulizers and vest therapy  - On noemi nebs month on/off  - Chronic azithromycin 500 mg three times/week     2. CFTR modulation: has not been on Trikafta since October, just telling us this today. Severe decline in pulmonary function per above. Will have Mirian FERRIS see today.  - Hepatic panel and CK now  - Resume Trikafat     3. Reactive hypoglycemia: AIC of 5.2 12/7/21 with fasting BS < 100, but significant post hypoglycemia down to 26, mild symptoms. Checking BS with symptoms, maybe once per week, last BS was 82.  - Encouraged patient to check BS periodically, discussed frequent small meals  - Needs to schedule follow up with Dr. Castro     4. Exocrine pancreatic insufficiency: with h/o malnutrition s/p G tube placement. Tube fell out and patient has been able to maintain his weight. BMI > CFF goal. Vitamin D was low in December, started on MVI with vitamin D.  - Continue pancreatic enzymes and vitamin supplementation     5. Hypertriglyceridemia: elevated since 2005, level today was  fasting and remains elevated.     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 No cough or shortness of breath, no recent illnesses. No sinus complaints, no new GI complaints. No nausea or bloating, stools are normal, one per day and sinking. Vesting BID.          Review of Systems:   Please see HPI. Otherwise, complete 10 point ROS negative.           Past Medical and Surgical History:     Past Medical History:   Diagnosis Date     CF (cystic fibrosis) (H) 11/30/2011     Chronic maxillary sinusitis 11/30/2011     Exocrine pancreatic insufficiency 11/30/2011     Past Surgical History:   Procedure Laterality Date     BRONCHOSCOPY (RIGID OR FLEXIBLE), DIAGNOSTIC N/A 10/2/2017    Procedure: COMBINED BRONCHOSCOPY (RIGID OR FLEXIBLE), LAVAGE;  Flexible Bronchoscopy With Lavage, PICC Line Placement ;  Surgeon: Darrel Dudley MD;  [facility]     CATARACT IOL, RT/LT Left      EXAM UNDER ANESTHESIA EYE(S) Left 3/17/2015    Procedure: EXAM UNDER ANESTHESIA EYE(S);  Surgeon: Aamir Taylor MD;  [facility]     HERNIA REPAIR  December 2014     INSERT PICC LINE Right 10/2/2017    Procedure: INSERT PICC LINE;;  Surgeon: Angeles Singh MD;  [facility]     LAPAROSCOPIC ASSISTED INSERTION TUBE GASTROTOMY N/A 10/16/2017    Procedure: LAPAROSCOPIC ASSISTED INSERTION TUBE GASTROSTOMY;  Laparoscopic Gastrostomy Tube Placement.;  Surgeon: Jeremy Obando MD;  [facility]     SCRUB ETHYLENEDIAMENETETRAACETIC (EDTA) Left 3/17/2015    Procedure: SCRUB ETHYLENEDIAMENETETRAACETIC (EDTA);  Surgeon: Aamir Taylor MD;  [facility]     SINUS SURGERY  3/2011         Family History:     Family History   Problem  Relation Age of Onset     Diabetes Paternal Grandfather               Social History:     Social History     Socioeconomic History     Marital status: Single     Spouse name: Not on file     Number of children: Not on file     Years of education: Not on file     Highest education level: Not on file   Occupational History     Not on file   Tobacco Use     Smoking status: Never Smoker     Smokeless tobacco: Never Used   Substance and Sexual Activity     Alcohol use: No     Drug use: No     Sexual activity: Not on file   Other Topics Concern     Parent/sibling w/ CABG, MI or angioplasty before 65F 55M? Not Asked   Social History Narrative    Mom is 35, Dad 39, both healthy.     Social Determinants of Health     Financial Resource Strain: Not on file   Food Insecurity: Not on file   Transportation Needs: Not on file   Physical Activity: Not on file   Stress: Not on file   Social Connections: Not on file   Intimate Partner Violence: Not on file   Housing Stability: Not on file            Medications:     Current Outpatient Medications   Medication     albuterol (VENTOLIN HFA) 108 (90 Base) MCG/ACT inhaler     amylase-lipase-protease (CREON) 59822-61306 units CPEP per EC capsule     azithromycin (ZITHROMAX) 500 MG tablet     aztreonam lysine (CAYSTON) 75 MG SOLR     blood glucose (ACCU-CHEK GUIDE) test strip     blood glucose monitoring (ACCU-CHEK FASTCLIX) lancets     dornase alpha (PULMOZYME) 1 MG/ML neb solution     elexacaftor-tezacaftor-ivacaftor & ivacaftor (TRIKAFTA) 100-50-75 & 150 MG tablet pack     ipratropium - albuterol 0.5 mg/2.5 mg/3 mL (DUONEB) 0.5-2.5 (3) MG/3ML neb solution     mvw complete formulation (SOFTGELS ) capsule     JOSE ALTERA NEBULIZER SYSTEM MISC     polyethylene glycol (MIRALAX) powder     Respiratory Therapy Supplies (JOSE ALTERA NEBULIZER HANDSET) MISC     sodium chloride inhalant (HYPERSAL) 7 % NEBU neb solution     tobramycin, PF, (NAMRATA) 300 MG/5ML neb solution     No current  "facility-administered medications for this visit.          Physical Exam:   /78   Pulse 92   Resp 17   Ht 1.727 m (5' 8\")   Wt 69.4 kg (153 lb)   SpO2 98%   BMI 23.26 kg/m      GENERAL: alert, NAD  HEENT: NCAT, EOMI, anicteric sclera, no oral mucosal edema or erythema  Neck: no cervical or supraclavicular adenopathy  Respiratory: good air flow, few scattered crackles bilaterally  CV: RRR, S1S2, no murmurs noted  Abdomen: normoactive BS, soft   Lymph: no edema, + digital clubbing  Neuro: AAO X 3, CN 2-12 grossly intact  Psychiatric: normal affect, good eye contact  Skin: no rash, jaundice or lesions on limited exam         Data:   All laboratory and imaging data reviewed.      Cystic Fibrosis Culture  Specimen Description   Date Value Ref Range Status   04/05/2021 Throat  Final   12/07/2020 Throat  Final   02/03/2020 Throat  Final    Culture Micro   Date Value Ref Range Status   04/05/2021 Moderate growth  Normal branden    Final   12/07/2020 Moderate growth  Normal branden    Final   02/03/2020 Canceled, Test credited  Incorrect specimen type    Final   02/03/2020   Final    Notification of test cancellation was given to  Mindy in [facility]. They do not want a yeast culture instead. 2.3.20 at 1109 bw          PFT interpretation:  Maneuver: valid and meets ATS guidelines  Normal ratio with decreased FEV1, but normal FVC  Compared to prior: FEV1 of 3.53 is 850 ml below prior      Nita Cedeño PA-C  "

## 2022-03-01 NOTE — NURSING NOTE
Chief Complaint   Patient presents with     RECHECK     Return Cystic Fibrosis     Medications reviewed and vital signs taken.   Lucila Iniguez, CMA     
Eyes with no visual disturbances.  Ears clean and dry and no hearing difficulties. Nose with pink mucosa and no drainage.  Mouth mucous membranes moist and pink.  No tenderness or swelling to throat or neck.

## 2022-03-01 NOTE — PATIENT INSTRUCTIONS
Cystic Fibrosis Self-Care Plan       Patient: Keon Conway   MRN: [id number]   Clinic Date: March 1, 2022     RECOMMENDATIONS:  1. Continue nebulizers and vest therapy.   2. Labs today, then resume Trikafta ASAP!  3. You need to discuss a glucose monitor with Dr. Castro or a plan for your low blood sugars.  4. If you lung function doesn't improve back to % on Trikafta, we will need to reevaluate using a second inhaled antibiotic.     Annual Studies:   IGG   Date Value Ref Range Status   12/07/2020 979 610 - 1,616 mg/dL Final     Immunoglobulin G   Date Value Ref Range Status   12/07/2021 999 610-1,616 mg/dL Final     Insulin   Date Value Ref Range Status   12/07/2021 13.8 3.0 - 25.0 mU/L Final   05/08/2019 5.7 3 - 25 mU/L Final     There are no preventive care reminders to display for this patient.    Pulmonary Function Tests  FEV1: amount of air you can blow out in 1 second  FVC: total amount of air you can take in and blow out    Your Goals:         PFT Latest Ref Rng & Units 3/1/2022   FVC L 4.63   FEV1 L 3.53   FVC% % 89   FEV1% % 80          Airway Clearance: The Most Important Way to Keep Your Lungs Healthy  Vest Settings:    Hill-Rom Frequencies: 8, 9, 10 Pressure 10 Then, Frequencies 18, 19, 20 Pressure 6      RespirTech: Quick Start with Pressure of     Do each frequency for 5 minutes; Deflate vest after each frequency & cough 3 times before beginning the next setting.    Vest and Neb Therapy should be done 2 times/day.    Good Nutrition Can Improve Lung Function and Overall Health     Take ALL of your vitamins with food     Take 1/2 of your enzymes before EVERY meal/snack and the other 1/2 mid-meal/snack    Wt Readings from Last 3 Encounters:   03/01/22 69.4 kg (153 lb)   12/07/21 68 kg (150 lb)   08/12/21 69.9 kg (154 lb 1.6 oz)       Body mass index is 23.26 kg/m .    National CF Foundation Recommendations for BMI in CF Adults: Women: at least 22 Men: at least 23      Controlling Blood  Sugars Helps Prevent Lung Infections & Improves Nutrition  Test blood sugar:     In the morning before eating (goal is )     2 hours after a meal (goal is less than 150)     When pre-meal glucose is greater than 150 add correction     At bedtime (if less than 100 eat a snack with 15 grams of carbohydrates  Last A1C Results:   Hemoglobin A1C POCT   Date Value Ref Range Status   12/07/2020 5.1 0 - 5.6 % Final     Comment:     Normal <5.7% Prediabetes 5.7-6.4%  Diabetes 6.5% or higher - adopted from ADA   consensus guidelines.       Hemoglobin A1C   Date Value Ref Range Status   12/07/2021 5.2 0.0 - 5.6 % Final     Comment:     Normal <5.7%   Prediabetes 5.7-6.4%    Diabetes 6.5% or higher     Note: Adopted from ADA consensus guidelines.         If diabetic, measure A1C every 6 months. Goal is under 7%.    Staying Healthy    Research: If you are interested in learning about research opportunities or have questions, please contact Lily Daniels at [phone] or [email or web address].      CF Foundation: Compass is a personalized resource service to help you with the insurance, financial, legal and other issues you are facing.  It's free, confidential and available to anyone with CF.  Ask your  for more information or contact Compass directly at 265-[facility] ([phone]) or compass@cff.org, or learn more at cff.org/compass.       CF Nurse Line: Rodney Ken: [phone]   Marcelina Sales, RT: [phone]     Kaye Mendes , Dieticians: [phone]     Mindy Pedro, Diabetes Nurse: [phone]    Felicia Garcia: [phone] or Lakisha Lay at [phone], Social Workers   [email or web address]

## 2022-03-01 NOTE — PROGRESS NOTES
Clinical Pharmacy Consult:                                                    Keon Conway is a 22 year old male seen for a clinical pharmacist consult.  He was referred to me from .Nita Cedeño PA-C. Follow up from 8/12/21.    Reason for Consult: Annual Medication Review    Discussion: Full MTM visit was completed due to time. Updates are as follows:    1. Cayston: Has not been taking Cayston, he trialed this for 'a few months' but did not find it helpful so stopped. Reviewed with provider Nita, plan to continue off Cayston and focus on adherence to Duoneb, Pulmozyme, and hypertonic saline.    2. Pulmozyme, Trikafta, Elgin: Keon reports that he has been out of Trikafta for a few months because he switched insurances and his mom wasn't able to get it from the pharmacy. He also reports being out of Pulmozyme. Reviewed insurance and pharmacy restrictions with clinic liaison Damian. [facility], [facility], and Trikafta to [facility].    Plan:  1. Continue off Cayston at this time  2. New prescriptions sent for Pulmozyme, Elgin and Trikafta  3. Mychart follow up in 1 week       Mirian Mederos PharmD  Cystic Fibrosis MTM Pharmacist  [facility]  Voicemail: [phone]

## 2022-03-03 NOTE — PROGRESS NOTES
Respiratory Therapist Note:         Vest                Brand: Hill-Rom - traditional Hill Rom: Frequencies 8, 9, 10 at pressure 10 then frequencies 18, 19, 20 at pressure 6.                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 Top rack of .         Education and Transition Information                Correct order of inhaled medications: Yes                Mechanism of Action of inhaled medications: Yes                Frequency of inhaled medications: Yes                Dosage of inhaled medications: Yes                Other: Nita ok with discontinuing Cayston, but need to continue Pulmozyme         Home Care:                Nebulizer Cups (Brand/Type): [employer]uSpeak                Nebulizer Compressor                            Year Purchased: Works                            [facility], Phone: [phone], Fax: [phone]                Nebulizer Supply Company:                            [facility], Phone: [phone], Fax: [phone]         Oxygen: N/A         Pulmonary Rehab                Site:                 Date Completed:          Plan of Care and Goals for next visit: Keep up the great work with your airway clearance. Please reach out with any respiratory needs.

## 2022-03-04 NOTE — PATIENT INSTRUCTIONS
Cystic Fibrosis Self-Care Plan       Patient: Keon Conway   MRN: [id number]   Clinic Date: August 13, 2019     RECOMMENDATIONS:  1. Continue nebulizers and vest therapy.   2. Resume azithromycin 500 mg three times/week.  3. Complete course of doxycycline.  4. [place] test dose close to home. Resume month of noemi nebs now and then you'll alternate one month noemi/one month [place].     Annual Studies:   IGG   Date Value Ref Range Status   05/08/2019 1,120 695 - 1,620 mg/dL Final     Insulin   Date Value Ref Range Status   05/08/2019 5.7 3 - 25 mU/L Final     There are no preventive care reminders to display for this patient.    Pulmonary Function Tests  FEV1: amount of air you can blow out in 1 second  FVC: total amount of air you can take in and blow out    Your Goals:         PFT Latest Ref Rng & Units 8/13/2019   FVC L 4.66   FEV1 L 3.74   FVC% % 92   FEV1% % 86          Airway Clearance: The Most Important Way to Keep Your Lungs Healthy  Vest Settings:    Hill-Rom Frequencies: 8, 9, 10 Pressure 10 Then, Frequencies 18, 19, 20 Pressure 6      RespirTech: Quick Start with Pressure of     Do each frequency for 5 minutes; Deflate vest after each frequency & cough 3 times before beginning the next setting.    Vest and Neb Therapy should be done 2 times/day.    Good Nutrition Can Improve Lung Function and Overall Health     Take ALL of your vitamins with food     Take 1/2 of your enzymes before EVERY meal/snack and the other 1/2 mid-meal/snack    Wt Readings from Last 3 Encounters:   08/13/19 70 kg (154 lb 5.2 oz) (50 %)*   07/26/19 67.6 kg (149 lb 1.6 oz) (42 %)*   07/24/19 66.6 kg (146 lb 13.2 oz) (38 %)*     * Growth percentiles are based on CDC (Boys, 2-20 Years) data.       Body mass index is 23.46 kg/m .         National CF Foundation Recommendations for BMI in CF Adults: Women: at least 22 Men: at least 23        Controlling Blood Sugars Helps Prevent Lung Infections & Improves Nutrition  Test blood  sugar:     In the morning before eating (goal is )     2 hours after a meal (goal is less than 150)     When pre-meal glucose is greater than 150 add correction     At bedtime (if less than 100 eat a snack with 15 grams of carbohydrates  Last A1C Results:   Hemoglobin A1C   Date Value Ref Range Status   07/26/2019 5.1 0 - 5.6 % Final     Comment:     Normal <5.7% Prediabetes 5.7-6.4%  Diabetes 6.5% or higher - adopted from ADA   consensus guidelines.           If diabetic, measure A1C every 6 months. Goal is under 7%.    Staying Healthy    Research: If you are interested in learning about research opportunities or have questions, please contact Lily Daniels at [phone] or [email or web address].       Foundation: Compass is a personalized resource service to help you with the insurance, financial, legal and other issues you are facing.  It's free, confidential and available to anyone with CF.  Ask your  for more information or contact Compass directly at [facility] ([phone]) or compass@cff.org, or learn more at cff.org/compass.       CF Nurse Line:  Gabby Ken and Maryann: [phone]   Marcelina Sales, RT: [phone]     Kaye Stark and Willow Mendes , Dieticians: [phone]     Mindy Pedro, Diabetes Nurse: [phone]    Felicia Garcia: [phone] or Lakisha Lay at [phone], Social Workers   [email or web address]     No

## 2022-03-07 LAB
BACTERIA SPEC CULT: ABNORMAL

## 2022-03-10 ENCOUNTER — TELEPHONE (OUTPATIENT)
Dept: ADMINISTRATIVE | Facility: CLINIC | Age: 22
End: 2022-03-10
Payer: COMMERCIAL

## 2022-03-10 ENCOUNTER — TELEPHONE (OUTPATIENT)
Dept: PULMONOLOGY | Facility: CLINIC | Age: 22
End: 2022-03-10
Payer: COMMERCIAL

## 2022-03-10 NOTE — PATIENT INSTRUCTIONS
See provider AVS for a summary of recommendations from today's visit.    Mirian Mederos, PharmD  Cystic Fibrosis MTM Pharmacist  [facility]  Voicemail: [phone]

## 2022-03-30 NOTE — PROGRESS NOTES
Cystic Fibrosis Clinical Follow Up Assessment   Assessment Link -Cystic Fibrosis Clinical Follow Up Assessment     2022/03/30 14:33:27 [facility], by Anna Lazcano        Activity date 2022/03/30        Providers     CHANTELLE ESTRADA        Medications     TRIKAFTA        Encounter Notes   JUMA MAGALLON is a 22-year-old Male being followed by the clinical pharmacist for medication monitoring of Cystic Fibrosis. This patient's current med list, allergies, and vaccination status have been reviewed and updated as appropriate.         Care Details     What are the patient's goals for this therapy?   ? 3/30/2022: Mom said pt is feeling better       Did you identify any patient barriers to access and successful treatment?   ? No barriers to access identified       Is it appropriate to collect a PDC at this time? [QA Metric] (An MPR or PDC would not be appropriate for cycled medications or if the patient is on therapy   ? No       Has the patient missed doses inappropriately?   ? No       Please select CURRENT side effect(s) for monitoring:   ? None          Identified concerns   None     Summary notes   Spoke to Mom 3/30/2022 after order set up with spec tech. Re-starting Trikafta: Side effects: Mom states he did have the cough for the first few days, but that went away Doses: doing well with the doses. Mom states he is feeling much better now that he is back on Trikafta. No health, allergy or medication changes. No questions or concerns.   - Since pt has been on before, and doing well with re-start, will move to quarterly TM f/u and if pt is stable may consider biannual.     Pearl LAZCANO, PharmD, CSP  Therapy Management Pharmacist  [address] [address]   [email or web address]  [email or web address]   Specialty: [phone]  Mail Order: [phone]

## 2022-04-08 NOTE — TELEPHONE ENCOUNTER
Nutrition Services Encounter:   Message received from CF center RN, patient's mother called stating that she was unable to get Relizorb cartridge after discharge and will be out of cartridges Saturday (10/21.) Needing a plan for enzymes and tube feeds over the weekend as cartridges will likely not arrive until Monday or Tuesday of next week.     Interventions:  Recommended the following:   - Keon to take 4 caps Creon 24 at the start and 4 caps Creon 24 at the end of his tube feeding cycle. Will continue this until Relizrob arrives; then go back to using enzyme cartridge with night drip feedings.   Mother verbalized understanding and did not have further questions.   RD to continue to monitor.     Melissa Watkins RD, LD, CNSC  Pediatric Cystic Fibrosis & Pulmonary Dietitian  [facility]  Pager #[phone]  Phone #[phone]         
3

## 2022-04-19 DIAGNOSIS — E84.0 CYSTIC FIBROSIS WITH PULMONARY MANIFESTATIONS (H): ICD-10-CM

## 2022-04-19 RX ORDER — SODIUM CHLORIDE FOR INHALATION 7 %
4 VIAL, NEBULIZER (ML) INHALATION 2 TIMES DAILY
Qty: 240 ML | Refills: 11 | Status: SHIPPED | OUTPATIENT
Start: 2022-04-19 | End: 2023-04-03

## 2022-04-25 NOTE — PROGRESS NOTES
[facility]  April 28, 2022         Assessment and Plan:   Keon Conway is a 22 year old male with cystic fibrosis who is seen today in clinic for routine follow up.     1. CF lung disease: since getting back on Trikafta, no new pulmonary complaints. Did have some cough/purge for the first few days, now with minimal cough, no dyspnea. Sating 99% on room air; vesting 1-2 times/day. PFTs with normal spirometry, improved 17% from prior. 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 No evidence of exacerbation.    - Continue current nebulizers and vest therapy, minimum of once/day  - On noemi nebs month on/off, no longer alternates with [place]  - Chronic azithromycin 500 mg three times/week     2. CFTR modulation: back on Trikafta and pulmonary function dramatically improved to his recent best. Labs. 3/1 were WNL.   - Labs in one month, then at next f/u  - Continue Trikafa     3. Reactive hypoglycemia: AIC of 5.2 on 12/7/21 with fasting BS < 100, but significant post hypoglycemia down to 26, mild symptoms. Checking BS a few times/week, usually in the mornings, around 80-90s, no lows.   - Encouraged patient to check BS periodically, discussed frequent small meals  - Needs to schedule follow up with Dr. Castro     4. Exocrine pancreatic insufficiency: no s/s of malabsorption. BMI > CFF goal.   - Continue pancreatic enzymes and vitamin supplementation     5. Hypertriglyceridemia: elevated since 2005, level today was fasting and remains elevated.     RTC: 3 months  Annual: December 2022 (DEXA due 12/2023)  Vaccinations: declines covid vaccine; will be due for TDAP in August    Nita Cedeño PA-C  Pulmonary, Allergy, Critical Care and Sleep Medicine        Interval History:     Fully back on Trikafta, feels much more clear, had an initial purge for the first 2-3 days. Cough now is minimal, no  congestion. No tightness or shortness of breath. No fever, chills, headaches, sinus congestion. Duoneb and Pulmozyme with vesting, 1-2 times/day. No bloating or gas, having one stool per day. Not fatty or floating.          Review of Systems:   Please see HPI. Otherwise, complete 10 point ROS negative.           Past Medical and Surgical History:     Past Medical History:   Diagnosis Date     CF (cystic fibrosis) (H) 11/30/2011     Chronic maxillary sinusitis 11/30/2011     Exocrine pancreatic insufficiency 11/30/2011     Past Surgical History:   Procedure Laterality Date     BRONCHOSCOPY (RIGID OR FLEXIBLE), DIAGNOSTIC N/A 10/2/2017    Procedure: COMBINED BRONCHOSCOPY (RIGID OR FLEXIBLE), LAVAGE;  Flexible Bronchoscopy With Lavage, PICC Line Placement ;  Surgeon: Darrel Dudley MD;  Location: [facility] OR     CATARACT IOL, RT/LT Left      EXAM UNDER ANESTHESIA EYE(S) Left 3/17/2015    Procedure: EXAM UNDER ANESTHESIA EYE(S);  Surgeon: Aamir Taylor MD;  Location: [facility] OR     HERNIA REPAIR  December 2014     INSERT PICC LINE Right 10/2/2017    Procedure: INSERT PICC LINE;;  Surgeon: Angeles Singh MD;  Location: [facility] OR     LAPAROSCOPIC ASSISTED INSERTION TUBE GASTROTOMY N/A 10/16/2017    Procedure: LAPAROSCOPIC ASSISTED INSERTION TUBE GASTROSTOMY;  Laparoscopic Gastrostomy Tube Placement.;  Surgeon: Jeremy Obando MD;  Location: [facility] OR     SCRUB ETHYLENEDIAMENETETRAACETIC (EDTA) Left 3/17/2015    Procedure: SCRUB ETHYLENEDIAMENETETRAACETIC (EDTA);  Surgeon: Aamir Taylor MD;  Location: [facility] OR     SINUS SURGERY  3/2011           Family History:     Family History   Problem Relation Age of Onset     Diabetes Paternal Grandfather             Social History:     Social History     Socioeconomic History     Marital status: Single     Spouse name: Not on file     Number of children: Not on file     Years of education: Not on file     Highest education level: Not on file   Occupational History     Not on file    Tobacco Use     Smoking status: Never Smoker     Smokeless tobacco: Never Used   Substance and Sexual Activity     Alcohol use: No     Drug use: No     Sexual activity: Not on file   Other Topics Concern     Parent/sibling w/ CABG, MI or angioplasty before 65F 55M? Not Asked   Social History Narrative    Mom is 35, Dad 39, both healthy.     Social Determinants of Health     Financial Resource Strain: Not on file   Food Insecurity: Not on file   Transportation Needs: Not on file   Physical Activity: Not on file   Stress: Not on file   Social Connections: Not on file   Intimate Partner Violence: Not on file   Housing Stability: Not on file            Medications:     Current Outpatient Medications   Medication     albuterol (VENTOLIN HFA) 108 (90 Base) MCG/ACT inhaler     amylase-lipase-protease (CREON) 98185-35035 units CPEP per EC capsule     azithromycin (ZITHROMAX) 500 MG tablet     blood glucose (ACCU-CHEK GUIDE) test strip     blood glucose monitoring (ACCU-CHEK FASTCLIX) lancets     dornase alpha (PULMOZYME) 2.5 MG/2.5ML neb solution     elexacaftor-tezacaftor-ivacaftor & ivacaftor (TRIKAFTA) 100-50-75 & 150 MG tablet pack     ipratropium - albuterol 0.5 mg/2.5 mg/3 mL (DUONEB) 0.5-2.5 (3) MG/3ML neb solution     mvw complete formulation (SOFTGELS ) capsule     polyethylene glycol (MIRALAX) powder     sodium chloride inhalant (HYPERSAL) 7 % NEBU neb solution     tobramycin, PF, (NAMRATA) 300 MG/5ML neb solution     No current facility-administered medications for this visit.            Physical Exam:   /78   Pulse 81   SpO2 99%     GENERAL: alert, NAD  HEENT: NCAT, EOMI, anicteric sclera, no oral mucosal edema or erythema  Neck: no cervical or supraclavicular adenopathy  Respiratory: good air flow, mainly clear  CV: RRR, S1S2, no murmurs noted  Abdomen: normoactive BS, soft   Lymph: no edema, + digital clubbing  Neuro: AAO X 3, CN 2-12 grossly intact  Psychiatric: normal affect, good eye  contact  Skin: no rash, jaundice or lesions on limited exam         Data:   All laboratory and imaging data reviewed.      Cystic Fibrosis Culture  Specimen Description   Date Value Ref Range Status   04/05/2021 Throat  Final   12/07/2020 Throat  Final   02/03/2020 Throat  Final    Culture Micro   Date Value Ref Range Status   04/05/2021 Moderate growth  Normal branden    Final   12/07/2020 Moderate growth  Normal branden    Final   02/03/2020 Canceled, Test credited  Incorrect specimen type    Final   02/03/2020   Final    Notification of test cancellation was given to  Mindy in [facility]. They do not want a yeast culture instead. 2.3.20 at 1109 bw          PFT interpretation:  Maneuver: valid and meets ATS guidelines  Normal spirometry  Compared to prior: FEV1 of 4.27 if 740 ml above prior

## 2022-04-28 ENCOUNTER — OFFICE VISIT (OUTPATIENT)
Dept: PULMONOLOGY | Facility: CLINIC | Age: 22
End: 2022-04-28
Attending: PHYSICIAN ASSISTANT
Payer: COMMERCIAL

## 2022-04-28 VITALS — OXYGEN SATURATION: 99 % | DIASTOLIC BLOOD PRESSURE: 78 MMHG | HEART RATE: 81 BPM | SYSTOLIC BLOOD PRESSURE: 111 MMHG

## 2022-04-28 DIAGNOSIS — E84.0 CYSTIC FIBROSIS WITH PULMONARY MANIFESTATIONS (H): Primary | ICD-10-CM

## 2022-04-28 DIAGNOSIS — E84.9 CF (CYSTIC FIBROSIS) (H): Primary | ICD-10-CM

## 2022-04-28 LAB
EXPTIME-PRE: 6.92 SEC
FEF2575-%PRED-PRE: 95 %
FEF2575-PRE: 4.57 L/SEC
FEF2575-PRED: 4.77 L/SEC
FEFMAX-%PRED-PRE: 113 %
FEFMAX-PRE: 10.95 L/SEC
FEFMAX-PRED: 9.69 L/SEC
FEV1-%PRED-PRE: 97 %
FEV1-PRE: 4.27 L
FEV1FEV6-PRE: 85 %
FEV1FEV6-PRED: 84 %
FEV1FVC-PRE: 85 %
FEV1FVC-PRED: 85 %
FIFMAX-PRE: 8.56 L/SEC
FVC-%PRED-PRE: 97 %
FVC-PRE: 5.02 L
FVC-PRED: 5.16 L

## 2022-04-28 PROCEDURE — 87070 CULTURE OTHR SPECIMN AEROBIC: CPT | Performed by: PHYSICIAN ASSISTANT

## 2022-04-28 PROCEDURE — G0463 HOSPITAL OUTPT CLINIC VISIT: HCPCS | Mod: 25

## 2022-04-28 PROCEDURE — 94375 RESPIRATORY FLOW VOLUME LOOP: CPT | Performed by: PHYSICIAN ASSISTANT

## 2022-04-28 PROCEDURE — 99214 OFFICE O/P EST MOD 30 MIN: CPT | Mod: 25 | Performed by: PHYSICIAN ASSISTANT

## 2022-04-28 NOTE — PATIENT INSTRUCTIONS
Cystic Fibrosis Self-Care Plan       Patient: Keon Conway   MRN: [id number]   Clinic Date: April 28, 2022     RECOMMENDATIONS:  1. Continue nebulizers and vest therapy. Do 2 vests on the days you do less physical activity. Otherwise, minimum of 1 vest per day.  2. Set up appt with Dr. Castro. Check you blood sugars 3-4 times/day before this visit, for 2-3 days.   3. You need to get labs in 1 month to continue Trikafta. We will fax orders, call and schedule appointment.     Annual Studies:   IGG   Date Value Ref Range Status   12/07/2020 979 610 - 1,616 mg/dL Final     Immunoglobulin G   Date Value Ref Range Status   12/07/2021 999 610-1,616 mg/dL Final     Insulin   Date Value Ref Range Status   12/07/2021 13.8 3.0 - 25.0 mU/L Final   05/08/2019 5.7 3 - 25 mU/L Final     There are no preventive care reminders to display for this patient.    Pulmonary Function Tests  FEV1: amount of air you can blow out in 1 second  FVC: total amount of air you can take in and blow out    Your Goals:         PFT Latest Ref Rng & Units 4/28/2022   FVC L 5.02   FEV1 L 4.27   FVC% % 97   FEV1% % 97          Airway Clearance: The Most Important Way to Keep Your Lungs Healthy  Vest Settings:   Hill-Rom Frequencies: 8, 9, 10 Pressure 10 Then, Frequencies 18, 19, 20 Pressure 6     RespirTech: Quick Start with Pressure of     Do each frequency for 5 minutes; Deflate vest after each frequency & cough 3 times before beginning the next setting.    Vest and Neb Therapy should be done 2 times/day.    Good Nutrition Can Improve Lung Function and Overall Health    Take ALL of your vitamins with food    Take 1/2 of your enzymes before EVERY meal/snack and the other 1/2 mid-meal/snack    Wt Readings from Last 3 Encounters:   03/01/22 69.4 kg (153 lb)   12/07/21 68 kg (150 lb)   08/12/21 69.9 kg (154 lb 1.6 oz)       There is no height or weight on file to calculate BMI.         National CF Foundation Recommendations for BMI in CF Adults:  Women: at least 22 Men: at least 23        Controlling Blood Sugars Helps Prevent Lung Infections & Improves Nutrition  Test blood sugar:    In the morning before eating (goal is )    2 hours after a meal (goal is less than 150)    When pre-meal glucose is greater than 150 add correction    At bedtime (if less than 100 eat a snack with 15 grams of carbohydrates  Last A1C Results:   Hemoglobin A1C POCT   Date Value Ref Range Status   12/07/2020 5.1 0 - 5.6 % Final     Comment:     Normal <5.7% Prediabetes 5.7-6.4%  Diabetes 6.5% or higher - adopted from ADA   consensus guidelines.       Hemoglobin A1C   Date Value Ref Range Status   12/07/2021 5.2 0.0 - 5.6 % Final     Comment:     Normal <5.7%   Prediabetes 5.7-6.4%    Diabetes 6.5% or higher     Note: Adopted from ADA consensus guidelines.         If diabetic, measure A1C every 6 months. Goal is under 7%.    Staying Healthy  Research: If you are interested in learning about research opportunities or have questions, please contact Lily Daniels at [phone] or [email or web address].    CF Foundation: Compass is a personalized resource service to help you with the insurance, financial, legal and other issues you are facing.  It's free, confidential and available to anyone with CF.  Ask your  for more information or contact Compass directly at 920-[facility] ([phone]) or compass@cff.org, or learn more at cff.[email or web address].       CF Nurse Line: Rodney Ken: [phone]  Marcelina Sales, RT: [phone]    Kaye Mendes , Dieticians: [phone]    Mindy Pedro, Diabetes Nurse: [phone]   Felicia Garcia: [phone] or Lakisha Lay at [phone], Social Workers  [email or web address]

## 2022-04-28 NOTE — NURSING NOTE
Keon Conway is a 22 year old year old who is being seen for Cystic Fibrosis (F/u)      Medications reviewed and Vital signs taken.    Specimen Collection Type: Throat Swab    Order(s) placed: CF Aerobic Bacterial        No results found for: ACIDFAST      Lab Results   Component Value Date    AFBSMS Negative for acid fast bacteria 07/29/2019    AFBSMS  07/29/2019     A minimum of 5 mL of sputum or fluid is recommended for recovery of acid fast bacilli   (AFB).  Volumes less than 5 mL are suboptimal and may compromise recovery of AFB from   culture.      AFBSMS  07/29/2019     Assayed at [facility] [employer] [address] [place] [phone]         Vitals were taken and medications were reconciled.     OSMAN Kramer

## 2022-04-28 NOTE — LETTER
4/28/2022         RE: Keon Conway  [address]  [facility] [address]        Dear Colleague,    Thank you for referring your patient, Keon Conway, to the [facility] [place]. Please see a copy of my visit note below.    [facility]  April 28, 2022         Assessment and Plan:   Keon Conway is a 22 year old male with cystic fibrosis who is seen today in clinic for routine follow up.     1. CF lung disease: since getting back on Trikafta, no new pulmonary complaints. Did have some cough/purge for the first few days, now with minimal cough, no dyspnea. Sating 99% on room air; vesting 1-2 times/day. PFTs with normal spirometry, improved 17% from prior. 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 No evidence of exacerbation.    - Continue current nebulizers and vest therapy, minimum of once/day  - On noemi nebs month on/off, no longer alternates with [place]  - Chronic azithromycin 500 mg three times/week     2. CFTR modulation: back on Trikafta and pulmonary function dramatically improved to his recent best. Labs. 3/1 were WNL.   - Labs in one month, then at next f/u  - Continue Trikafa     3. Reactive hypoglycemia: AIC of 5.2 on 12/7/21 with fasting BS < 100, but significant post hypoglycemia down to 26, mild symptoms. Checking BS a few times/week, usually in the mornings, around 80-90s, no lows.   - Encouraged patient to check BS periodically, discussed frequent small meals  - Needs to schedule follow up with Dr. Castro     4. Exocrine pancreatic insufficiency: no s/s of malabsorption. BMI > CFF goal.   - Continue pancreatic enzymes and vitamin supplementation     5. Hypertriglyceridemia: elevated since 2005, level today was fasting and remains elevated.     RTC: 3 months  Annual: December 2022 (DEXA due  12/2023)  Vaccinations: declines covid vaccine; will be due for TDAP in August    Nita Cedeño PA-C  Pulmonary, Allergy, Critical Care and Sleep Medicine        Interval History:     Fully back on Trikafta, feels much more clear, had an initial purge for the first 2-3 days. Cough now is minimal, no congestion. No tightness or shortness of breath. No fever, chills, headaches, sinus congestion. Duoneb and Pulmozyme with vesting, 1-2 times/day. No bloating or gas, having one stool per day. Not fatty or floating.          Review of Systems:   Please see HPI. Otherwise, complete 10 point ROS negative.           Past Medical and Surgical History:     Past Medical History:   Diagnosis Date     CF (cystic fibrosis) (H) 11/30/2011     Chronic maxillary sinusitis 11/30/2011     Exocrine pancreatic insufficiency 11/30/2011     Past Surgical History:   Procedure Laterality Date     BRONCHOSCOPY (RIGID OR FLEXIBLE), DIAGNOSTIC N/A 10/2/2017    Procedure: COMBINED BRONCHOSCOPY (RIGID OR FLEXIBLE), LAVAGE;  Flexible Bronchoscopy With Lavage, PICC Line Placement ;  Surgeon: Darrel Dudley MD;  [facility]     CATARACT IOL, RT/LT Left      EXAM UNDER ANESTHESIA EYE(S) Left 3/17/2015    Procedure: EXAM UNDER ANESTHESIA EYE(S);  Surgeon: Aamir Taylor MD;  [facility]     HERNIA REPAIR  December 2014     INSERT PICC LINE Right 10/2/2017    Procedure: INSERT PICC LINE;;  Surgeon: Angeles Singh MD;  [facility]     LAPAROSCOPIC ASSISTED INSERTION TUBE GASTROTOMY N/A 10/16/2017    Procedure: LAPAROSCOPIC ASSISTED INSERTION TUBE GASTROSTOMY;  Laparoscopic Gastrostomy Tube Placement.;  Surgeon: Jeremy Obando MD;  [facility]     SCRUB ETHYLENEDIAMENETETRAACETIC (EDTA) Left 3/17/2015    Procedure: SCRUB ETHYLENEDIAMENETETRAACETIC (EDTA);  Surgeon: Aamir Taylor MD;  [facility]     SINUS SURGERY  3/2011           Family History:     Family History   Problem Relation Age of Onset     Diabetes  Paternal Grandfather             Social History:     Social History     Socioeconomic History     Marital status: Single     Spouse name: Not on file     Number of children: Not on file     Years of education: Not on file     Highest education level: Not on file   Occupational History     Not on file   Tobacco Use     Smoking status: Never Smoker     Smokeless tobacco: Never Used   Substance and Sexual Activity     Alcohol use: No     Drug use: No     Sexual activity: Not on file   Other Topics Concern     Parent/sibling w/ CABG, MI or angioplasty before 65F 55M? Not Asked   Social History Narrative    Mom is 35, Dad 39, both healthy.     Social Determinants of Health     Financial Resource Strain: Not on file   Food Insecurity: Not on file   Transportation Needs: Not on file   Physical Activity: Not on file   Stress: Not on file   Social Connections: Not on file   Intimate Partner Violence: Not on file   Housing Stability: Not on file            Medications:     Current Outpatient Medications   Medication     albuterol (VENTOLIN HFA) 108 (90 Base) MCG/ACT inhaler     amylase-lipase-protease (CREON) 29130-75149 units CPEP per EC capsule     azithromycin (ZITHROMAX) 500 MG tablet     blood glucose (ACCU-CHEK GUIDE) test strip     blood glucose monitoring (ACCU-CHEK FASTCLIX) lancets     dornase alpha (PULMOZYME) 2.5 MG/2.5ML neb solution     elexacaftor-tezacaftor-ivacaftor & ivacaftor (TRIKAFTA) 100-50-75 & 150 MG tablet pack     ipratropium - albuterol 0.5 mg/2.5 mg/3 mL (DUONEB) 0.5-2.5 (3) MG/3ML neb solution     mvw complete formulation (SOFTGELS ) capsule     polyethylene glycol (MIRALAX) powder     sodium chloride inhalant (HYPERSAL) 7 % NEBU neb solution     tobramycin, PF, (NAMRATA) 300 MG/5ML neb solution     No current facility-administered medications for this visit.            Physical Exam:   /78   Pulse 81   SpO2 99%     GENERAL: alert, NAD  HEENT: NCAT, EOMI, anicteric sclera, no oral  mucosal edema or erythema  Neck: no cervical or supraclavicular adenopathy  Respiratory: good air flow, mainly clear  CV: RRR, S1S2, no murmurs noted  Abdomen: normoactive BS, soft   Lymph: no edema, + digital clubbing  Neuro: AAO X 3, CN 2-12 grossly intact  Psychiatric: normal affect, good eye contact  Skin: no rash, jaundice or lesions on limited exam         Data:   All laboratory and imaging data reviewed.      Cystic Fibrosis Culture  Specimen Description   Date Value Ref Range Status   04/05/2021 Throat  Final   12/07/2020 Throat  Final   02/03/2020 Throat  Final    Culture Micro   Date Value Ref Range Status   04/05/2021 Moderate growth  Normal branden    Final   12/07/2020 Moderate growth  Normal branden    Final   02/03/2020 Canceled, Test credited  Incorrect specimen type    Final   02/03/2020   Final    Notification of test cancellation was given to  Mindy in [facility]. They do not want a yeast culture instead. 2.3.20 at 1109 bw          PFT interpretation:  Maneuver: valid and meets ATS guidelines  Normal spirometry  Compared to prior: FEV1 of 4.27 if 740 ml above prior        Again, thank you for allowing me to participate in the care of your patient.        Sincerely,        Nita Cedeño PA-C

## 2022-05-03 LAB — BACTERIA SPEC CULT: NORMAL

## 2022-06-06 LAB
ALBUMIN (EXTERNAL): 4.2 G/DL (ref 3.5–5)
ALKALINE PHOSPHATASE (EXTERNAL): 128 U/L (ref 40–150)
ALT SERPL-CCNC: 19 U/L (ref 6–40)
AST SERPL-CCNC: 19 U/L (ref 10–40)
BILIRUB SERPL-MCNC: 0.7 MG/DL (ref 0.2–1.2)
BILIRUBIN DIRECT (EXTERNAL): 0.3 MG/DL (ref 0–0.5)
CK SERPL-CCNC: 96 U/L
PROTEIN TOTAL (EXTERNAL): 7.6 G/DL (ref 6–8)

## 2022-06-07 ENCOUNTER — CLINICAL UPDATE (OUTPATIENT)
Dept: PHARMACY | Facility: CLINIC | Age: 22
End: 2022-06-07
Payer: COMMERCIAL

## 2022-06-07 DIAGNOSIS — E84.9 CF (CYSTIC FIBROSIS) (H): Primary | ICD-10-CM

## 2022-06-07 PROCEDURE — 99207 PR NO CHARGE LOS: CPT | Performed by: PHARMACIST

## 2022-06-07 NOTE — PROGRESS NOTES
Clinical Pharmacy Consult:                                                    A chart review was conducted for Keon Conway.      Reason for Consult: Trikafta Quarterly Lab Monitoring 1/4    Discussion: Keon originally started Trikafta in November 2019. However he reported going off Trikafta ~12/2021 before having a decline in lung function and restarting 3/2022. Per chart review Keon is taking full dose Trikafta at this time.     Refill History per [facility]:  3/2/22, 3/31/22, 4/26/22, 5/26/22 (2 refills remaining)    Labs were reviewed from 5/31/22 at [facility]. Hepatic panel and CK are within normal limits.     (ALT, AST, total bilirubin, direct bilirubin, CK)  Lab Results   Component Value Date    80019 19 05/31/2022    58236 19 05/31/2022    83702 0.7 05/31/2022    42162 0.3 05/31/2022    CKT 96 05/31/2022       Plan:  1. Continue Trikafta  2. Re-check hepatic panel and CK in 3 months      Mirian Mederos, PharmD  Cystic Fibrosis MTM Pharmacist  [facility]  Voicemail: [phone]

## 2022-07-25 NOTE — PROGRESS NOTES
[facility]  July 28, 2022         Assessment and Plan:   Keon Conway is a 22 year old male with cystic fibrosis who is seen today in clinic for routine follow up.     1. CF lung disease: no new pulmonary complaints, has been consistent with his Trikafta use, wince getting back on the medication. Sating 99% on room air; vesting 1 time per day. PFTs stable at his Trikafta baseline, slightly below his yearly best. Previous cultures have grown out MSSA, H. Influenzae and Ps A. No evidence of exacerbation.    - Continue current nebulizers and vest therapy, 1-2 times/day  - On noemi nebs month on/off, no longer alternates with [place], if PFTs decrease may need to add [place] back into regimen  - Chronic azithromycin 500 mg three times/week     2. CFTR modulation: back on Trikafta and tolerating it well. Labs today WNL.   - Continue Trikafa     3. Reactive hypoglycemia: AIC of 5.2 on 12/7/21 with fasting BS < 100, but significant post hypoglycemia down to 26, mild symptoms. Hasn't been checking BS, hasn't felt like he has any low BS.   - Needs to schedule f/u with Dr. Castro     4. Exocrine pancreatic insufficiency: no s/s of malabsorption. BMI > CFF goal. BHARATH Daugherty, to see today.  - Continue pancreatic enzymes and vitamin supplementation     5. Hypertriglyceridemia: elevated since 2005, level in January was fasting and remains elevated, but lower than prior levels.  - Will monitor and recheck with annual studies    RTC: 3 months  Annual: December 2022 (DEXA due 12/2023)  Vaccinations: declines covid vaccine; will be due for TDAP in August/next visit    Nita Cedeño PA-C  Pulmonary, Allergy, Critical Care and Sleep Medicine        Interval History:     Feeling well, remains on Trikafta. No recent sickness, no allergies or sinus congestion. No cough or shortness of breath. Vesting once/day. No bloating or gas, having 1 stool per day, not fatty or floating.           Review of Systems:   Please see HPI. Otherwise, complete 10 point ROS negative.           Past Medical and Surgical History:     Past Medical History:   Diagnosis Date     CF (cystic fibrosis) (H) 11/30/2011     Chronic maxillary sinusitis 11/30/2011     Exocrine pancreatic insufficiency 11/30/2011     Past Surgical History:   Procedure Laterality Date     BRONCHOSCOPY (RIGID OR FLEXIBLE), DIAGNOSTIC N/A 10/2/2017    Procedure: COMBINED BRONCHOSCOPY (RIGID OR FLEXIBLE), LAVAGE;  Flexible Bronchoscopy With Lavage, PICC Line Placement ;  Surgeon: Darrel Dudley MD;  [facility]     CATARACT IOL, RT/LT Left      EXAM UNDER ANESTHESIA EYE(S) Left 3/17/2015    Procedure: EXAM UNDER ANESTHESIA EYE(S);  Surgeon: Aamir Taylor MD;  [facility]     HERNIA REPAIR  December 2014     INSERT PICC LINE Right 10/2/2017    Procedure: INSERT PICC LINE;;  Surgeon: Angeles Singh MD;  [facility]     LAPAROSCOPIC ASSISTED INSERTION TUBE GASTROTOMY N/A 10/16/2017    Procedure: LAPAROSCOPIC ASSISTED INSERTION TUBE GASTROSTOMY;  Laparoscopic Gastrostomy Tube Placement.;  Surgeon: Jeremy Obando MD;  [facility]     SCRUB ETHYLENEDIAMENETETRAACETIC (EDTA) Left 3/17/2015    Procedure: SCRUB ETHYLENEDIAMENETETRAACETIC (EDTA);  Surgeon: Aamir Taylor MD;  [facility]     SINUS SURGERY  3/2011           Family History:     Family History   Problem Relation Age of Onset     Diabetes Paternal Grandfather             Social History:     Social History     Socioeconomic History     Marital status: Single     Spouse name: Not on file     Number of children: Not on file     Years of education: Not on file     Highest education level: Not on file   Occupational History     Not on file   Tobacco Use     Smoking status: Never Smoker     Smokeless tobacco: Never Used   Substance and Sexual Activity     Alcohol use: No     Drug use: No     Sexual activity: Not on file   Other Topics Concern      "Parent/sibling w/ CABG, MI or angioplasty before 65F 55M? Not Asked   Social History Narrative    Mom is 35, Dad 39, both healthy.     Social Determinants of Health     Financial Resource Strain: Not on file   Food Insecurity: Not on file   Transportation Needs: Not on file   Physical Activity: Not on file   Stress: Not on file   Social Connections: Not on file   Intimate Partner Violence: Not on file   Housing Stability: Not on file            Medications:     Current Outpatient Medications   Medication     elexacaftor-tezacaftor-ivacaftor & ivacaftor (TRIKAFTA) 100-50-75 & 150 MG tablet pack     albuterol (VENTOLIN HFA) 108 (90 Base) MCG/ACT inhaler     amylase-lipase-protease (CREON) 16315-72170 units CPEP per EC capsule     azithromycin (ZITHROMAX) 500 MG tablet     blood glucose (ACCU-CHEK GUIDE) test strip     blood glucose monitoring (ACCU-CHEK FASTCLIX) lancets     dornase alpha (PULMOZYME) 2.5 MG/2.5ML neb solution     ipratropium - albuterol 0.5 mg/2.5 mg/3 mL (DUONEB) 0.5-2.5 (3) MG/3ML neb solution     mvw complete formulation (SOFTGELS ) capsule     polyethylene glycol (MIRALAX) powder     sodium chloride inhalant (HYPERSAL) 7 % NEBU neb solution     tobramycin, PF, (NAMRATA) 300 MG/5ML neb solution     No current facility-administered medications for this visit.            Physical Exam:   /83   Pulse 76   Ht 1.72 m (5' 7.72\")   Wt 70.9 kg (156 lb 4.9 oz)   SpO2 99%   BMI 23.97 kg/m      GENERAL: alert, NAD  HEENT: NCAT, EOMI, anicteric sclera, no oral mucosal edema or erythema  Neck: no cervical or supraclavicular adenopathy  Respiratory: good air flow, mainly clear  CV: RRR, S1S2, no murmurs noted  Abdomen: normoactive BS, soft   Lymph: no edema, + digital clubbing  Neuro: AAO X 3, CN 2-12 grossly intact  Psychiatric: normal affect, good eye contact  Skin: no rash, jaundice or lesions on limited exam         Data:   All laboratory and imaging data reviewed.      Cystic Fibrosis " Culture  Specimen Description   Date Value Ref Range Status   04/05/2021 Throat  Final   12/07/2020 Throat  Final   02/03/2020 Throat  Final    Culture Micro   Date Value Ref Range Status   04/05/2021 Moderate growth  Normal branden    Final   12/07/2020 Moderate growth  Normal branden    Final   02/03/2020 Canceled, Test credited  Incorrect specimen type    Final   02/03/2020   Final    Notification of test cancellation was given to  Mindy in [facility]. They do not want a yeast culture instead. 2.3.20 at 1109 bw          PFT interpretation:  Maneuver: valid and meets ATS guidelines  Normal spirometry

## 2022-07-28 ENCOUNTER — CLINICAL UPDATE (OUTPATIENT)
Dept: PHARMACY | Facility: CLINIC | Age: 22
End: 2022-07-28

## 2022-07-28 ENCOUNTER — LAB (OUTPATIENT)
Dept: LAB | Facility: CLINIC | Age: 22
End: 2022-07-28
Attending: PHYSICIAN ASSISTANT
Payer: COMMERCIAL

## 2022-07-28 ENCOUNTER — ALLIED HEALTH/NURSE VISIT (OUTPATIENT)
Dept: CARE COORDINATION | Facility: CLINIC | Age: 22
End: 2022-07-28

## 2022-07-28 ENCOUNTER — OFFICE VISIT (OUTPATIENT)
Dept: PULMONOLOGY | Facility: CLINIC | Age: 22
End: 2022-07-28
Attending: PHYSICIAN ASSISTANT
Payer: COMMERCIAL

## 2022-07-28 VITALS
BODY MASS INDEX: 23.69 KG/M2 | OXYGEN SATURATION: 99 % | WEIGHT: 156.31 LBS | HEART RATE: 76 BPM | HEIGHT: 68 IN | SYSTOLIC BLOOD PRESSURE: 129 MMHG | DIASTOLIC BLOOD PRESSURE: 83 MMHG

## 2022-07-28 DIAGNOSIS — E84.0 CYSTIC FIBROSIS WITH PULMONARY MANIFESTATIONS (H): ICD-10-CM

## 2022-07-28 DIAGNOSIS — E84.9 CF (CYSTIC FIBROSIS) (H): Primary | ICD-10-CM

## 2022-07-28 DIAGNOSIS — E84.9 CYSTIC FIBROSIS (H): ICD-10-CM

## 2022-07-28 DIAGNOSIS — Z13.9 RISK AND FUNCTIONAL ASSESSMENT: Primary | ICD-10-CM

## 2022-07-28 DIAGNOSIS — K86.81 EXOCRINE PANCREATIC INSUFFICIENCY: ICD-10-CM

## 2022-07-28 LAB
ALBUMIN SERPL-MCNC: 4.1 G/DL (ref 3.4–5)
ALP SERPL-CCNC: 114 U/L (ref 40–150)
ALT SERPL W P-5'-P-CCNC: 26 U/L (ref 0–70)
AST SERPL W P-5'-P-CCNC: 18 U/L (ref 0–45)
BILIRUB DIRECT SERPL-MCNC: 0.1 MG/DL (ref 0–0.2)
BILIRUB SERPL-MCNC: 0.4 MG/DL (ref 0.2–1.3)
CK SERPL-CCNC: 125 U/L (ref 30–300)
EXPTIME-PRE: 8.01 SEC
FEF2575-%PRED-PRE: 97 %
FEF2575-PRE: 4.66 L/SEC
FEF2575-PRED: 4.77 L/SEC
FEFMAX-%PRED-PRE: 120 %
FEFMAX-PRE: 11.71 L/SEC
FEFMAX-PRED: 9.69 L/SEC
FEV1-%PRED-PRE: 95 %
FEV1-PRE: 4.18 L
FEV1FEV6-PRE: 85 %
FEV1FEV6-PRED: 84 %
FEV1FVC-PRE: 85 %
FEV1FVC-PRED: 85 %
FIFMAX-PRE: 8.46 L/SEC
FVC-%PRED-PRE: 95 %
FVC-PRE: 4.93 L
FVC-PRED: 5.16 L
PROT SERPL-MCNC: 7.2 G/DL (ref 6.8–8.8)

## 2022-07-28 PROCEDURE — 80076 HEPATIC FUNCTION PANEL: CPT | Performed by: PATHOLOGY

## 2022-07-28 PROCEDURE — 99214 OFFICE O/P EST MOD 30 MIN: CPT | Mod: 25 | Performed by: PHYSICIAN ASSISTANT

## 2022-07-28 PROCEDURE — 97803 MED NUTRITION INDIV SUBSEQ: CPT | Performed by: DIETITIAN, REGISTERED

## 2022-07-28 PROCEDURE — 82550 ASSAY OF CK (CPK): CPT | Performed by: PATHOLOGY

## 2022-07-28 PROCEDURE — 94375 RESPIRATORY FLOW VOLUME LOOP: CPT | Performed by: PHYSICIAN ASSISTANT

## 2022-07-28 PROCEDURE — 99207 PR NO CHARGE LOS: CPT | Performed by: PHARMACIST

## 2022-07-28 PROCEDURE — G0463 HOSPITAL OUTPT CLINIC VISIT: HCPCS | Mod: 25

## 2022-07-28 PROCEDURE — 87070 CULTURE OTHR SPECIMN AEROBIC: CPT | Performed by: PHYSICIAN ASSISTANT

## 2022-07-28 PROCEDURE — 36415 COLL VENOUS BLD VENIPUNCTURE: CPT | Performed by: PATHOLOGY

## 2022-07-28 RX ORDER — ELEXACAFTOR, TEZACAFTOR, AND IVACAFTOR 100-50-75
KIT ORAL
Qty: 84 TABLET | Refills: 5 | Status: SHIPPED | OUTPATIENT
Start: 2022-07-28 | End: 2023-01-20

## 2022-07-28 ASSESSMENT — PATIENT HEALTH QUESTIONNAIRE - PHQ9
5. POOR APPETITE OR OVEREATING: NOT AT ALL
SUM OF ALL RESPONSES TO PHQ QUESTIONS 1-9: 0

## 2022-07-28 ASSESSMENT — ANXIETY QUESTIONNAIRES
7. FEELING AFRAID AS IF SOMETHING AWFUL MIGHT HAPPEN: NOT AT ALL
6. BECOMING EASILY ANNOYED OR IRRITABLE: NOT AT ALL
3. WORRYING TOO MUCH ABOUT DIFFERENT THINGS: NOT AT ALL
GAD7 TOTAL SCORE: 0
5. BEING SO RESTLESS THAT IT IS HARD TO SIT STILL: NOT AT ALL
2. NOT BEING ABLE TO STOP OR CONTROL WORRYING: NOT AT ALL
GAD7 TOTAL SCORE: 0
1. FEELING NERVOUS, ANXIOUS, OR ON EDGE: NOT AT ALL

## 2022-07-28 ASSESSMENT — PAIN SCALES - GENERAL: PAINLEVEL: NO PAIN (0)

## 2022-07-28 NOTE — PROGRESS NOTES
"Adult Cystic Fibrosis Program  Annual Psychosocial Assessment    Presenting Information:  KEON is an 22-year-old male with cystic fibrosis, presenting in CF clinic for a follow up with CF provider, Nita Rivas.  Met with Keon for annual psychosocial assessment.      Living situation:  Keon lives with his parents and brother in [place], [place].  His parents own the home. They have 2 dogs. He does not currently pay rent. He denies any related concerns. Keon hopes to buy a house soon.     Family Constellation:  Keon was raised by his biological parents. He has 1 sibling(s): an older brother.  He notes that all four of his grandparents are still living. His extended family all lives close by. He is not aware of anyone else in his family having CF. He reports his family members are all doing well.      Social Support:  Keon reports good social support.  He gets along well with family members and draws additional support from his girlfriend of 6 years (Miri) and friends. He does not have any connections in the CF Community at this time.     Adjustment to Illness:  Keon was diagnosed with CF at age 18 months. He reports being somewhat sick often during his childhood with some hospitalizations.     Today he describes his current health status as \"good\". He started trikafta a few years ago and notes it continues to go well.  Clinically, he has mild lung disease and pancreatic insufficiency. He typically does 1 vest treatment(s) per day. He does not formally exercise but notes being constantly on the move at work. He denies any health problems that interfere with activities of daily living.     Keon is open about his CF diagnosis.          Health Care Directive:  Keon has previously received Health Care Directive education. This SW provided overview of education, including concept/purpose of health care directive, default health care agents and how to complete a directive. Keon is comfortable with his " "default health care agent(s) (parents) in absence of a directive. He is not currently interested in reviewing a health care directive.     Education:  Keon has completed high school. He does not currently have plans for further education. He is aware that scholarships are available if he changes his mind.     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 During the farming season (Spring until about November) he works about 60-70 hours a week. 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 He denies related employment concerns at this time.      Finances:  Keon receives income from wages and also relies on some parental support. He does not currently pay rent, for food or medical needs/expenses. He denies any financial concerns at this time.     Insurance:  Keon is insured through his mother's employer policy as well as [facility] He denies any related concerns at this time. He was encouraged to call SW to report changes/questions/concerns/needs should they arise.     Mental Health/Coping:  Keon denies any current or past symptoms indicative of mood, anxiety, eating, learning or other mental health disorder. He reports a positive and stable mood recently.    Keon was administered the following screens today in clinic:  RADHA-7: score of 0, indicating an absence of anxiety symptoms.   PHQ-9: score of 0, indicating an absence of depression symptoms.     He does not take any medications for mental health and does not see a therapist. Keon reports low/manageable stress levels. He was not able to identify any particular coping skills today.      Keon does not identify elizabeth/spirituality as important in his life and goes to Anabaptism \"off and on\".     Chemical Health:  Keon denies the use of alcohol, " tobacco or other drugs.     Leisure Activities/Interests:   Keon enjoys going fishing, ice fishing, hanging out with friends/Miri, snowmobiling, and riding on his side by side.     Intervention:  -Psychosocial Assessment  -Supportive counseling    Assessment:  Keon was pleasant and appeared to be open in his responses. He was brief in his answers which is his baseline. He denies any major changes in his life currently. He continues to work long hours during farming season but does not feel this impacts his health. 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 He continues to be well supported by his parents and girlfriend. No financial/insurance concerns.     Keon seems to be psychosocially stable overall, with access to relevant resources and supports.  No concerns expressed/noted.    Plan:  Re-consult for any psychosocial needs that may arise.    Complete psychosocial assessment annually.  Continue to follow for regular clinic consult.    Lakisha [facility]  Adult Cystic Fibrosis   Ph: [phone], Pager: [phone]

## 2022-07-28 NOTE — PATIENT INSTRUCTIONS
Cystic Fibrosis Self-Care Plan       Patient: Keon Conway   MRN: [id number]   Clinic Date: July 28, 2022     RECOMMENDATIONS:  Continue nebs and vest therapy at least daily, increase to twice per day if you are less active or feel congested.     Annual Studies:   IGG   Date Value Ref Range Status   12/07/2020 979 610 - 1,616 mg/dL Final     Immunoglobulin G   Date Value Ref Range Status   12/07/2021 999 610-1,616 mg/dL Final     Insulin   Date Value Ref Range Status   12/07/2021 13.8 3.0 - 25.0 mU/L Final   05/08/2019 5.7 3 - 25 mU/L Final     There are no preventive care reminders to display for this patient.    Pulmonary Function Tests  FEV1: amount of air you can blow out in 1 second  FVC: total amount of air you can take in and blow out    Your Goals:         PFT Latest Ref Rng & Units 7/28/2022   FVC L 4.93   FEV1 L 4.18   FVC% % 95   FEV1% % 95          Airway Clearance: The Most Important Way to Keep Your Lungs Healthy  Vest Settings:   Hill-Rom Frequencies: 8, 9, 10 Pressure 10 Then, Frequencies 18, 19, 20 Pressure 6     RespirTech: Quick Start with Pressure of     Do each frequency for 5 minutes; Deflate vest after each frequency & cough 3 times before beginning the next setting.    Vest and Neb Therapy should be done 2 times/day.    Good Nutrition Can Improve Lung Function and Overall Health    Take ALL of your vitamins with food    Take 1/2 of your enzymes before EVERY meal/snack and the other 1/2 mid-meal/snack    Wt Readings from Last 3 Encounters:   07/28/22 70.9 kg (156 lb 4.9 oz)   03/01/22 69.4 kg (153 lb)   12/07/21 68 kg (150 lb)       Body mass index is 23.97 kg/m .         National CF Foundation Recommendations for BMI in CF Adults: Women: at least 22 Men: at least 23        Controlling Blood Sugars Helps Prevent Lung Infections & Improves Nutrition  Test blood sugar:    In the morning before eating (goal is )    2 hours after a meal (goal is less than 150)    When pre-meal  glucose is greater than 150 add correction    At bedtime (if less than 100 eat a snack with 15 grams of carbohydrates  Last A1C Results:   Hemoglobin A1C POCT   Date Value Ref Range Status   12/07/2020 5.1 0 - 5.6 % Final     Comment:     Normal <5.7% Prediabetes 5.7-6.4%  Diabetes 6.5% or higher - adopted from ADA   consensus guidelines.       Hemoglobin A1C   Date Value Ref Range Status   12/07/2021 5.2 0.0 - 5.6 % Final     Comment:     Normal <5.7%   Prediabetes 5.7-6.4%    Diabetes 6.5% or higher     Note: Adopted from ADA consensus guidelines.         If diabetic, measure A1C every 6 months. Goal is under 7%.    Staying Healthy  Research: If you are interested in learning about research opportunities or have questions, please contact Lily Daniels at [phone] or [email or web address].     Foundation: Compass is a personalized resource service to help you with the insurance, financial, legal and other issues you are facing.  It's free, confidential and available to anyone with CF.  Ask your  for more information or contact Compass directly at [facility] ([phone]) or compass@cff.org, or learn more at cff.org/compass.       CF Nurse Line: Rodney Ken: [phone]  Marcelina Sales, RT: [phone]    Kaye Stark and Willow Mendes , Dieticians: [phone]    Mindy Pedro, Diabetes Nurse: [phone]   Felicia Garcia: [phone] or Lakisha Lay at [phone], Social Workers  [email or web address]

## 2022-07-28 NOTE — LETTER
7/28/2022         RE: Keon Conway  [address]  [facility] [address]        Dear Colleague,    Thank you for referring your patient, Keon Conway, to the [facility] [place]. Please see a copy of my visit note below.    [facility]  July 28, 2022         Assessment and Plan:   Keon Conway is a 22 year old male with cystic fibrosis who is seen today in clinic for routine follow up.     1. CF lung disease: no new pulmonary complaints, has been consistent with his Trikafta use, wince getting back on the medication. Sating 99% on room air; vesting 1 time per day. PFTs stable at his Trikafta baseline, slightly below his yearly best. Previous cultures have grown out MSSA, H. Influenzae and Ps A. No evidence of exacerbation.    - Continue current nebulizers and vest therapy, 1-2 times/day  - On noemi nebs month on/off, no longer alternates with [place], if PFTs decrease may need to add [place] back into regimen  - Chronic azithromycin 500 mg three times/week     2. CFTR modulation: back on Trikafta and tolerating it well. Labs today WNL.   - Continue Trikafa     3. Reactive hypoglycemia: AIC of 5.2 on 12/7/21 with fasting BS < 100, but significant post hypoglycemia down to 26, mild symptoms. Hasn't been checking BS, hasn't felt like he has any low BS.   - Needs to schedule f/u with Dr. Castro     4. Exocrine pancreatic insufficiency: no s/s of malabsorption. BMI > CFF goal. BHARATH Daugherty, to see today.  - Continue pancreatic enzymes and vitamin supplementation     5. Hypertriglyceridemia: elevated since 2005, level in January was fasting and remains elevated, but lower than prior levels.  - Will monitor and recheck with annual studies    RTC: 3 months  Annual: December 2022 (DEXA due 12/2023)  Vaccinations: declines covid vaccine; will be due for TDAP in August/next visit    Nita Cedeño  LEEANNE  Pulmonary, Allergy, Critical Care and Sleep Medicine        Interval History:     Feeling well, remains on Trikafta. No recent sickness, no allergies or sinus congestion. No cough or shortness of breath. Vesting once/day. No bloating or gas, having 1 stool per day, not fatty or floating.          Review of Systems:   Please see HPI. Otherwise, complete 10 point ROS negative.           Past Medical and Surgical History:     Past Medical History:   Diagnosis Date     CF (cystic fibrosis) (H) 11/30/2011     Chronic maxillary sinusitis 11/30/2011     Exocrine pancreatic insufficiency 11/30/2011     Past Surgical History:   Procedure Laterality Date     BRONCHOSCOPY (RIGID OR FLEXIBLE), DIAGNOSTIC N/A 10/2/2017    Procedure: COMBINED BRONCHOSCOPY (RIGID OR FLEXIBLE), LAVAGE;  Flexible Bronchoscopy With Lavage, PICC Line Placement ;  Surgeon: Darrel Dudley MD;  Location: [facility] OR     CATARACT IOL, RT/LT Left      EXAM UNDER ANESTHESIA EYE(S) Left 3/17/2015    Procedure: EXAM UNDER ANESTHESIA EYE(S);  Surgeon: Aamir Taylor MD;  Location: [facility] OR     HERNIA REPAIR  December 2014     INSERT PICC LINE Right 10/2/2017    Procedure: INSERT PICC LINE;;  Surgeon: Angeles Singh MD;  Location: [facility] OR     LAPAROSCOPIC ASSISTED INSERTION TUBE GASTROTOMY N/A 10/16/2017    Procedure: LAPAROSCOPIC ASSISTED INSERTION TUBE GASTROSTOMY;  Laparoscopic Gastrostomy Tube Placement.;  Surgeon: Jeremy Obando MD;  Location[facility] OR     SCRUB ETHYLENEDIAMENETETRAACETIC (EDTA) Left 3/17/2015    Procedure: SCRUB ETHYLENEDIAMENETETRAACETIC (EDTA);  Surgeon: Aamir Taylor MD;  [facility]: [facility] OR     SINUS SURGERY  3/2011           Family History:     Family History   Problem Relation Age of Onset     Diabetes Paternal Grandfather             Social History:     Social History     Socioeconomic History     Marital status: Single     Spouse name: Not on file     Number of children: Not on file     Years of education: Not on file  "    Highest education level: Not on file   Occupational History     Not on file   Tobacco Use     Smoking status: Never Smoker     Smokeless tobacco: Never Used   Substance and Sexual Activity     Alcohol use: No     Drug use: No     Sexual activity: Not on file   Other Topics Concern     Parent/sibling w/ CABG, MI or angioplasty before 65F 55M? Not Asked   Social History Narrative    Mom is 35, Dad 39, both healthy.     Social Determinants of Health     Financial Resource Strain: Not on file   Food Insecurity: Not on file   Transportation Needs: Not on file   Physical Activity: Not on file   Stress: Not on file   Social Connections: Not on file   Intimate Partner Violence: Not on file   Housing Stability: Not on file            Medications:     Current Outpatient Medications   Medication     elexacaftor-tezacaftor-ivacaftor & ivacaftor (TRIKAFTA) 100-50-75 & 150 MG tablet pack     albuterol (VENTOLIN HFA) 108 (90 Base) MCG/ACT inhaler     amylase-lipase-protease (CREON) 84609-99222 units CPEP per EC capsule     azithromycin (ZITHROMAX) 500 MG tablet     blood glucose (ACCU-CHEK GUIDE) test strip     blood glucose monitoring (ACCU-CHEK FASTCLIX) lancets     dornase alpha (PULMOZYME) 2.5 MG/2.5ML neb solution     ipratropium - albuterol 0.5 mg/2.5 mg/3 mL (DUONEB) 0.5-2.5 (3) MG/3ML neb solution     mvw complete formulation (SOFTGELS ) capsule     polyethylene glycol (MIRALAX) powder     sodium chloride inhalant (HYPERSAL) 7 % NEBU neb solution     tobramycin, PF, (NAMRATA) 300 MG/5ML neb solution     No current facility-administered medications for this visit.            Physical Exam:   /83   Pulse 76   Ht 1.72 m (5' 7.72\")   Wt 70.9 kg (156 lb 4.9 oz)   SpO2 99%   BMI 23.97 kg/m      GENERAL: alert, NAD  HEENT: NCAT, EOMI, anicteric sclera, no oral mucosal edema or erythema  Neck: no cervical or supraclavicular adenopathy  Respiratory: good air flow, mainly clear  CV: RRR, S1S2, no murmurs " noted  Abdomen: normoactive BS, soft   Lymph: no edema, + digital clubbing  Neuro: AAO X 3, CN 2-12 grossly intact  Psychiatric: normal affect, good eye contact  Skin: no rash, jaundice or lesions on limited exam         Data:   All laboratory and imaging data reviewed.      Cystic Fibrosis Culture  Specimen Description   Date Value Ref Range Status   2021 Throat  Final   2020 Throat  Final   2020 Throat  Final    Culture Micro   Date Value Ref Range Status   2021 Moderate growth  Normal branden    Final   2020 Moderate growth  Normal branden    Final   2020 Canceled, Test credited  Incorrect specimen type    Final   2020   Final    Notification of test cancellation was given to  Mindy in [facility]. They do not want a yeast culture instead. 2.3.20 at 1109 bw          PFT interpretation:  Maneuver: valid and meets ATS guidelines  Normal spirometry      CF Annual Nutrition Assessment    Reason for Assessment  Assessed during clinic visit with Nita Cedeño r/t increased nutrition risk with diagnosis of CF per protocol.    Nutrition Significant PMH  Mild Lung Disease - taking Trikafta  Pancreatic Insufficient  G-tube placed  -- fell out 2021 and maintaining weight  Reactive hypoglycemia with OGTT (since )    Anthropometric Assessment  Height: 172 cm  IBW based on BMI 22 for females and 23 for males per CF Foundation recs: 68.6 kg (151 lbs)  Today's Weight: 70.9 kg (actual weight) (156 lbs)  Body mass index is 23.97 kg/m .     Wt Readings from Last 5 Encounters:   22 70.9 kg (156 lb 4.9 oz)   22 69.4 kg (153 lb)   21 68 kg (150 lb)   21 69.9 kg (154 lb 1.6 oz)   21 69.6 kg (153 lb 7 oz)     Comments: Weight remained stable over the last year and at CFF goal.     Pancreatic Enzymes  Brand:  Creon [id number]  Dosin with meals, 3 with snacks = 2030 units lipase/kg/meal  Estimated Daily Intake: 20-24 caps = 8230 units lipase/kg/day    Signs of  Malabsorption:  No  Enzyme Program:  None    Diet History and Assessment  Diet Preferences/Allergies/Intolerances: High Kcal/Pro.   Intake Recall/Comments: Fair/good appetite at baseline, reports typical intake BID meals + 1-2 snacks daily. Skips breakfast but may have snacks or granola bars on the way to work with Trikafta. Mom does majority of cooking but Keon does some grocery shopping and grilling. Has been able to maintain weight without TFs. Overall consumes good variety including fruits, vegetables, and dairy foods/protein.     Diet Recall:  Breakfast- skips, may have snacks like granola bars  Lunch- pack leftovers to eat at work or 3-4 ham sandwiches + 2 cups fruit + granola bar  Dinner- Mom cooks, usually meat/potatoes  Snacks- PB/cheese crackers, granola bars, yogurt, peanuts    Calcium: adequate with 3+ cups of whole milk and cheese/yogurt  Salt: adds to foods + Gatorade zero  Hydration: adequate, drinks lots of water/milk, occasionally sports drinks like Gatorade.   Supplements: No  Tube Feeding: No - GT fell out in March 2021. Has been able to maintain weight without TFs.  - Previous regimen: Nutren 2.0 @ 80 mL/hr x 6 hrs to provide 500 mls, 1000 kcals, 42 g pro.     Estimated Energy and Protein Needs  Estimation based on weight maintenance/gain with Mild lung disease and pancreatic insufficient.    BEE: 1700   0985-2027 kcals/day = 150-175% BEE   g protein/day = 1.2-1.5 g/kg    Laboratory Assessment  Annual study labs obtained Dec 2020  Vitamin A- 0.65 wnl  Vitamin D- 17 Low --> supplementation adjusted per mychart  Vitamin E- 7.4 wnl  Iron- 119 wnl  Lipid Panel- TG elevated although improving; will continue to monitor with AS    OGTT- 2021 with reactive hypoglycemia. Peaks at 196 at 1-hr down to 26 at 2-hr  DEXA- 2021, lowest z-score 0.8    Current Vitamin/Mineral Prescription: restarted MVW Complete  - 1 cap per day; taking daily  Comments: obtains from [facility]    CF Related Diabetes  Evaluation  Keon asymptomatic with hypoglycemia noted on OGTT. Educated by CF RD on 5/8/19 for diet recs to prevent reactive hypoglycemia.   -- Seen by endocrine/Dr. Castro and CDE 2019. Given glucose meter and recommended to complete BG checks if symptomatic. Complete OGTT annually.  -- Educated again by CF RD 12/2021 regarding hypoglycemia. Pt reports he has plans to follow-up with endo.     Nutrition Diagnosis  Impaired nutrient utilization related to pancreatic insufficiency as evidenced by requires pancreatic enzyme replacement therapy, high kcal/pro diet, and vitamin/mineral supplementation in order to maintain BMI >23 kg/m2 and support overall health.     Interventions/Recommendations  1) Discussed current nutrition status including review of weight trends and adequacy of total calorie intake. Continue to encourage good PO with balanced intake for weight maintenance. No GI concerns noted today and will continue with current enzyme regimen. Keon continues to look for a house and encouraged pt to become involved in meal prep/cooking in preparation for living on his own.     2) Continue current vitamin supplementation with MVW Complete . Monitor Vit D level with next annual study labs.     GOALS:  1) Weight maintenance BMI >23 kg/m2  2) Take vitamins/enzymes as prescribed    FOLLOW-UP/MONITORING:  Visit patient within 12 month(s) for annual nutrition assessment.     Time Spent In Face-to-Face Patient Interactions: 15 minutes    Willow Mendes RD, LD  Cystic Fibrosis/Lung Transplant Dietitian  Pager [phone]

## 2022-07-28 NOTE — PROGRESS NOTES
CF Annual Nutrition Assessment    Reason for Assessment  Assessed during clinic visit with Nita Cedeño r/t increased nutrition risk with diagnosis of CF per protocol.    Nutrition Significant PMH  Mild Lung Disease - taking Trikafta  Pancreatic Insufficient  G-tube placed  -- fell out 2021 and maintaining weight  Reactive hypoglycemia with OGTT (since )    Anthropometric Assessment  Height: 172 cm  IBW based on BMI 22 for females and 23 for males per CF Foundation recs: 68.6 kg (151 lbs)  Today's Weight: 70.9 kg (actual weight) (156 lbs)  Body mass index is 23.97 kg/m .     Wt Readings from Last 5 Encounters:   22 70.9 kg (156 lb 4.9 oz)   22 69.4 kg (153 lb)   21 68 kg (150 lb)   21 69.9 kg (154 lb 1.6 oz)   21 69.6 kg (153 lb 7 oz)     Comments: Weight remained stable over the last year and at CFF goal.     Pancreatic Enzymes  Brand:  Creon [id number]  Dosin with meals, 3 with snacks = 2030 units lipase/kg/meal  Estimated Daily Intake: 20-24 caps = 8230 units lipase/kg/day    Signs of Malabsorption:  No  Enzyme Program:  None    Diet History and Assessment  Diet Preferences/Allergies/Intolerances: High Kcal/Pro.   Intake Recall/Comments: Fair/good appetite at baseline, reports typical intake BID meals + 1-2 snacks daily. Skips breakfast but may have snacks or granola bars on the way to work with Trikafta. Mom does majority of cooking but Keon does some grocery shopping and grilling. Has been able to maintain weight without TFs. Overall consumes good variety including fruits, vegetables, and dairy foods/protein.     Diet Recall:  Breakfast- skips, may have snacks like granola bars  Lunch- pack leftovers to eat at work or 3-4 ham sandwiches + 2 cups fruit + granola bar  Dinner- Mom cooks, usually meat/potatoes  Snacks- PB/cheese crackers, granola bars, yogurt, peanuts    Calcium: adequate with 3+ cups of whole milk and cheese/yogurt  Salt: adds to foods + Gatorade  zero  Hydration: adequate, drinks lots of water/milk, occasionally sports drinks like Gatorade.   Supplements: No  Tube Feeding: No - GT fell out in March 2021. Has been able to maintain weight without TFs.  - Previous regimen: Nutren 2.0 @ 80 mL/hr x 6 hrs to provide 500 mls, 1000 kcals, 42 g pro.     Estimated Energy and Protein Needs  Estimation based on weight maintenance/gain with Mild lung disease and pancreatic insufficient.    BEE: 1700   8357-9910 kcals/day = 150-175% BEE   g protein/day = 1.2-1.5 g/kg    Laboratory Assessment  Annual study labs obtained Dec 2020  Vitamin A- 0.65 wnl  Vitamin D- 17 Low --> supplementation adjusted per [facility]C [employer]  Vitamin E- 7.4 wnl  Iron- 119 wnl  Lipid Panel- TG elevated although improving; will continue to monitor with AS    OGTT- 2021 with reactive hypoglycemia. Peaks at 196 at 1-hr down to 26 at 2-hr  DEXA- 2021, lowest z-score 0.8    Current Vitamin/Mineral Prescription: restarted MVW Complete  - 1 cap per day; taking daily  Comments: obtains from [facility]    CF Related Diabetes Evaluation  Keon asymptomatic with hypoglycemia noted on OGTT. Educated by CF RD on 5/8/19 for diet recs to prevent reactive hypoglycemia.   -- Seen by endocrine/Dr. Castro and CDE 2019. Given glucose meter and recommended to complete BG checks if symptomatic. Complete OGTT annually.  -- Educated again by CF RD 12/2021 regarding hypoglycemia. Pt reports he has plans to follow-up with endo.     Nutrition Diagnosis  Impaired nutrient utilization related to pancreatic insufficiency as evidenced by requires pancreatic enzyme replacement therapy, high kcal/pro diet, and vitamin/mineral supplementation in order to maintain BMI >23 kg/m2 and support overall health.     Interventions/Recommendations  1) Discussed current nutrition status including review of weight trends and adequacy of total calorie intake. Continue to encourage good PO with balanced intake for weight maintenance. No GI concerns  noted today and will continue with current enzyme regimen. Keon continues to look for a house and encouraged pt to become involved in meal prep/cooking in preparation for living on his own.     2) Continue current vitamin supplementation with MVW Complete . Monitor Vit D level with next annual study labs.     GOALS:  1) Weight maintenance BMI >23 kg/m2  2) Take vitamins/enzymes as prescribed    FOLLOW-UP/MONITORING:  Visit patient within 12 month(s) for annual nutrition assessment.     Time Spent In Face-to-Face Patient Interactions: 15 minutes    Willow Mendes RD, LD  Cystic Fibrosis/Lung Transplant Dietitian  Pager [phone]

## 2022-07-28 NOTE — NURSING NOTE
"Keon Conway is a 22 year old year old who is being seen for Cystic Fibrosis (CF Follow up )      Medications reviewed and Vital signs taken.    Specimen Collection Type: Throat Swab    Order(s) placed: CF Aerobic Bacterial    *IF AFB order placed - please enter \"PRIORITIZE AFB\" to order comments.       No results found for: ACIDFAST      Lab Results   Component Value Date    AFBSMS Negative for acid fast bacteria 07/29/2019    AFBSMS  07/29/2019     A minimum of 5 mL of sputum or fluid is recommended for recovery of acid fast bacilli   (AFB).  Volumes less than 5 mL are suboptimal and may compromise recovery of AFB from   culture.      AFBSMS  07/29/2019     Assayed at [email or web address] [address] [place] [address] [phone]     Vitals were taken and medications were reconciled.     Etelvina BREWER  7:47 AM        "

## 2022-07-28 NOTE — PROGRESS NOTES
Clinical Pharmacy Consult:                                                    A chart review was conducted for Keon Conway.      Reason for Consult: Trikafta Quarterly Lab Monitoring 2/4    Discussion: Keon originally started Trikafta in November 2019. However he reported going off Trikafta ~12/2021 before having a decline in lung function and restarting 3/2022. Per chart review Keon is taking full dose Trikafta at this time.     Refill History per [facility]:  3/2/22, 3/31/22, 4/26/22, 5/26/22, 6/30/22, (next refill in process)    Labs were reviewed from 7/28/22 at [facility]. Hepatic panel and CK are within normal limits.   Lab Results   Component Value Date    ALT 26 07/28/2022    AST 18 07/28/2022    BILITOTAL 0.4 07/28/2022    DBIL 0.1 07/28/2022     07/28/2022       Plan:  1. Continue Trikafta  2. Re-check hepatic panel and CK in 3 months      Mirian Mederos, PharmD  Cystic Fibrosis MTM Pharmacist  [facility]  Voicemail: [phone]

## 2022-08-02 LAB — BACTERIA SPEC CULT: NORMAL

## 2022-09-20 ENCOUNTER — TELEPHONE (OUTPATIENT)
Dept: PULMONOLOGY | Facility: CLINIC | Age: 22
End: 2022-09-20

## 2022-09-22 ENCOUNTER — TELEPHONE (OUTPATIENT)
Dept: PULMONOLOGY | Facility: CLINIC | Age: 22
End: 2022-09-22

## 2022-11-07 ENCOUNTER — PHARMACY VISIT (OUTPATIENT)
Dept: ADMINISTRATIVE | Facility: CLINIC | Age: 22
End: 2022-11-07

## 2022-11-07 NOTE — PROGRESS NOTES
Cystic Fibrosis Clinical Follow Up Assessment   Assessment Link -Cystic Fibrosis Clinical Follow Up Assessment     2022/11/07 18:53:46 [facility], by Anna Lazcano        Activity Date 2022/11/07     Care Details    What are the patient's goals for this therapy?   ? 3/30/2022: Mom said pt is feeling better      Did you identify any patient barriers to access and successful treatment?   ? No barriers to access identified      Is it appropriate to collect a PDC at this time? [QA Metric] (An MPR or PDC would not be appropriate for cycled medications or if the patient is on therapy   ? Yes      Document the date of the last refill of PDC   ? 2022-10-11      Document PDC   ? 0.89      Has the patient missed doses inappropriately?   ? No      Please select CURRENT side effect(s) for monitoring:   ? None        Summary Notes   I had the pleasure of speaking to Mom for TM. Side effects: None. Doses: states he does will with remembering his doses as he has pill reminders. PDC= 0.89. No health, allergy or medication changes. No questions or concerns.   - Will begin biannual TM    Pearl LAZCANO, GracyD, CSP  Therapy Management Pharmacist  [address] [address]   [email or web address]  [email or web address]   Specialty: [phone]  Mail Order: [phone]

## 2022-11-20 ENCOUNTER — HEALTH MAINTENANCE LETTER (OUTPATIENT)
Age: 22
End: 2022-11-20

## 2022-12-01 DIAGNOSIS — E84.9 CF (CYSTIC FIBROSIS) (H): ICD-10-CM

## 2023-01-20 DIAGNOSIS — E84.9 CYSTIC FIBROSIS (H): ICD-10-CM

## 2023-01-20 RX ORDER — ELEXACAFTOR, TEZACAFTOR, AND IVACAFTOR 100-50-75
KIT ORAL
Qty: 84 TABLET | Refills: 0 | Status: SHIPPED | OUTPATIENT
Start: 2023-01-20 | End: 2023-02-27

## 2023-01-24 DIAGNOSIS — K86.81 EXOCRINE PANCREATIC INSUFFICIENCY: ICD-10-CM

## 2023-01-24 DIAGNOSIS — E84.9 CF (CYSTIC FIBROSIS) (H): ICD-10-CM

## 2023-01-24 DIAGNOSIS — E55.9 HYPOVITAMINOSIS D: ICD-10-CM

## 2023-01-24 RX ORDER — PEDIATRIC MULTIVIT 61/D3/VIT K 1500-800
1 CAPSULE ORAL DAILY
Qty: 60 CAPSULE | Refills: 11 | Status: SHIPPED | OUTPATIENT
Start: 2023-01-24 | End: 2024-02-13

## 2023-02-06 DIAGNOSIS — E84.9 CF (CYSTIC FIBROSIS) (H): ICD-10-CM

## 2023-02-06 RX ORDER — IPRATROPIUM BROMIDE AND ALBUTEROL SULFATE 2.5; .5 MG/3ML; MG/3ML
SOLUTION RESPIRATORY (INHALATION)
Qty: 360 ML | Refills: 3 | Status: SHIPPED | OUTPATIENT
Start: 2023-02-06 | End: 2024-02-13

## 2023-02-17 DIAGNOSIS — E84.9 CYSTIC FIBROSIS (H): ICD-10-CM

## 2023-02-17 RX ORDER — ELEXACAFTOR, TEZACAFTOR, AND IVACAFTOR 100-50-75
KIT ORAL
Qty: 84 TABLET | Refills: 0 | OUTPATIENT
Start: 2023-02-17

## 2023-02-27 RX ORDER — ELEXACAFTOR, TEZACAFTOR, AND IVACAFTOR 100-50-75
KIT ORAL
Qty: 84 TABLET | Refills: 1 | Status: SHIPPED | OUTPATIENT
Start: 2023-02-27 | End: 2023-04-03

## 2023-02-28 DIAGNOSIS — E84.9 CF (CYSTIC FIBROSIS) (H): Primary | ICD-10-CM

## 2023-03-02 ENCOUNTER — TELEPHONE (OUTPATIENT)
Dept: PULMONOLOGY | Facility: CLINIC | Age: 23
End: 2023-03-02
Payer: COMMERCIAL

## 2023-03-02 ENCOUNTER — TELEPHONE (OUTPATIENT)
Dept: PULMONOLOGY | Facility: CLINIC | Age: 23
End: 2023-03-02

## 2023-03-02 NOTE — TELEPHONE ENCOUNTER
PA Initiation    Medication: Pulmozyme PA renewal pending  Insurance Company: Other (see comments)Comment:  [facility]  Pharmacy Filling the Rx: [facility] (HOME [facility]IVERY) [place], [place] - [address]  Filling Pharmacy Phone:    Filling Pharmacy Fax:    Start Date: 3/2/2023     Key: [id number]

## 2023-03-02 NOTE — TELEPHONE ENCOUNTER
Prior Authorization Not Needed per Insurance    Medication: Trikafta No PA Required   Insurance Company: Other (see comments)Comment:  [facility]  Expected CoPay:      Pharmacy Filling the Rx: [facility] - [address]  Pharmacy Notified:    Patient Notified:      Additional Information Required  Member should be able to get the drug/product without a PA at this time.          PA Initiation    Medication: Trikafta PA pending  Insurance Company: Other (see comments)Comment:  [facility]  Pharmacy Filling the Rx: [facility] - [address]  Filling Pharmacy Phone:    Filling Pharmacy Fax:    Start Date: 3/2/2023    Key: [id number]

## 2023-03-03 NOTE — TELEPHONE ENCOUNTER
Prior Authorization Approval    Authorization Effective Date: 3/2/2023  Authorization Expiration Date: 3/1/2024  Medication: Pulmozyme PA approved   Approved Dose/Quantity: 2.5ml / 150ml for 30 ds   Reference #: Key: [id number]   Insurance Company: Other (see comments)Comment:  [employer]  Expected CoPay:       CoPay Card Available:      Foundation Assistance Needed:    Which Pharmacy is filling the prescription (Not needed for infusion/clinic administered): [facility] ([facility]IV[facility]) [address]  Pharmacy Notified:    Patient Notified:

## 2023-04-02 NOTE — PROGRESS NOTES
[facility]  April 3, 2023         Assessment and Plan:   Keon Conway is a 23 year old male with cystic fibrosis who is seen today in clinic for routine follow up. He was last seen in July 2022.     1. CF lung disease: no new pulmonary complaints, has been consistent with his Trikafta use. Denies any illnesses since his follow up last summer. Sating 96% on room air; vesting 1 time per day. PFTs stable at his baseline. Previous cultures have grown out MSSA, H. Influenzae and Ps A. No evidence of exacerbation.    - Continue current nebulizers and vest therapy, okay to stop HTS and continue Pulmozyme; could probably stop nebs and vesting with how active he is, but needs to consistently come to clinic for monitoring  - Chronic azithromycin 500 mg three times/week     2. CFTR modulation: back on Trikafta and tolerating it well. Labs today WNL.   - Continue Trikafa     3. Reactive hypoglycemia: AIC of 5.2 on 12/7/21 with fasting BS < 100, but significant post hypoglycemia down to 26, mild symptoms. Hasn't been checking BS, hasn't felt like he has any low BS.   - Added on AIC to today  - OGTT with next f/u    4. Exocrine pancreatic insufficiency: no s/s of malabsorption. BMI > CFF goal.   - Continue pancreatic enzymes and vitamin supplementation  - Added on some labs to today     5. Hypertriglyceridemia: elevated since 2005, level in January was fasting and remains elevated, but lower than prior levels.  - Cholesterol panel added on to today    RTC: 3-4 months  Annual: overdue (added on as many labs as possible, other labs, CXR and OGTT with next visit; DEXA due 12/2023  Vaccinations: declines covid vaccine    Nita Cedeño PA-C  Pulmonary, Allergy, Critical Care and Sleep Medicine        Interval History:     Feeling well, no cough, no shortness of breath, no congestion or tightness. . No sicknesses over the last 9 months. Vesting in the morning with nebs.  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 No bloating or gas, has about 1 stool/day, sinks. No grease or fat. Does not check BS, hasn't done so since last summer, denies feeling of low BS. Not excessively thirsty or urinating frequently.          Review of Systems:   Please see HPI. Otherwise, complete 10 point ROS negative.           Past Medical and Surgical History:     Past Medical History:   Diagnosis Date     CF (cystic fibrosis) (H) 11/30/2011     Chronic maxillary sinusitis 11/30/2011     Exocrine pancreatic insufficiency 11/30/2011     Past Surgical History:   Procedure Laterality Date     BRONCHOSCOPY (RIGID OR FLEXIBLE), DIAGNOSTIC N/A 10/2/2017    Procedure: COMBINED BRONCHOSCOPY (RIGID OR FLEXIBLE), LAVAGE;  Flexible Bronchoscopy With Lavage, PICC Line Placement ;  Surgeon: Darrel Dudley MD;  [facility]     CATARACT IOL, RT/LT Left      EXAM UNDER ANESTHESIA EYE(S) Left 3/17/2015    Procedure: EXAM UNDER ANESTHESIA EYE(S);  Surgeon: Aamir Taylor MD;  [facility]     HERNIA REPAIR  December 2014     INSERT PICC LINE Right 10/2/2017    Procedure: INSERT PICC LINE;;  Surgeon: Angeles Singh MD;  [facility]     LAPAROSCOPIC ASSISTED INSERTION TUBE GASTROTOMY N/A 10/16/2017    Procedure: LAPAROSCOPIC ASSISTED INSERTION TUBE GASTROSTOMY;  Laparoscopic Gastrostomy Tube Placement.;  Surgeon: Jeremy Obando MD;  [facility]     SCRUB ETHYLENEDIAMENETETRAACETIC (EDTA) Left 3/17/2015    Procedure: SCRUB ETHYLENEDIAMENETETRAACETIC (EDTA);  Surgeon: Aamir Taylor MD;  [facility]     SINUS SURGERY  3/2011           Family History:     Family History   Problem Relation Age of Onset     Diabetes Paternal Grandfather             Social History:     Social History     Socioeconomic History     Marital status: Single     Spouse name: Not on file     Number of children: Not on file     Years of education: Not on file      "Highest education level: Not on file   Occupational History     Not on file   Tobacco Use     Smoking status: Never     Smokeless tobacco: Never   Vaping Use     Vaping status: Not on file   Substance and Sexual Activity     Alcohol use: No     Drug use: No     Sexual activity: Not on file   Other Topics Concern     Parent/sibling w/ CABG, MI or angioplasty before 65F 55M? Not Asked   Social History Narrative    Mom is 35, Dad 39, both healthy.     Social Determinants of Health     Financial Resource Strain: Not on file   Food Insecurity: Not on file   Transportation Needs: Not on file   Physical Activity: Not on file   Stress: Not on file   Social Connections: Not on file   Intimate Partner Violence: Not on file   Housing Stability: Not on file            Medications:     Current Outpatient Medications   Medication     albuterol (VENTOLIN HFA) 108 (90 Base) MCG/ACT inhaler     amylase-lipase-protease (CREON) 01688-39289 units CPEP per EC capsule     azithromycin (ZITHROMAX) 500 MG tablet     blood glucose (ACCU-CHEK GUIDE) test strip     blood glucose monitoring (ACCU-CHEK FASTCLIX) lancets     dornase alpha (PULMOZYME) 2.5 MG/2.5ML neb solution     elexacaftor-tezacaftor-ivacaftor & ivacaftor (TRIKAFTA) 100-50-75 & 150 MG tablet pack     ipratropium - albuterol 0.5 mg/2.5 mg/3 mL (DUONEB) 0.5-2.5 (3) MG/3ML neb solution     mvw complete formulation (SOFTGELS ) capsule     polyethylene glycol (MIRALAX) powder     sodium chloride inhalant (HYPERSAL) 7 % NEBU neb solution     No current facility-administered medications for this visit.            Physical Exam:   /79   Pulse 97   Resp 17   Ht 1.72 m (5' 7.72\")   Wt 74.8 kg (164 lb 14.5 oz)   SpO2 96%   BMI 25.28 kg/m      GENERAL: alert, NAD  HEENT: NCAT, EOMI, anicteric sclera, no oral mucosal edema or erythema  Neck: no cervical or supraclavicular adenopathy  Respiratory: good air flow, mainly clear  CV: RRR, S1S2, no murmurs noted  Abdomen: " normoactive BS, soft   Lymph: no edema  Neuro: AAO X 3, CN 2-12 grossly intact  Psychiatric: normal affect, good eye contact  Skin: no rash, jaundice or lesions on limited exam         Data:   All laboratory and imaging data reviewed.      Cystic Fibrosis Culture  Specimen Description   Date Value Ref Range Status   04/05/2021 Throat  Final   12/07/2020 Throat  Final   02/03/2020 Throat  Final    Culture Micro   Date Value Ref Range Status   04/05/2021 Moderate growth  Normal branden    Final   12/07/2020 Moderate growth  Normal branden    Final   02/03/2020 Canceled, Test credited  Incorrect specimen type    Final   02/03/2020   Final    Notification of test cancellation was given to  Mindy in [facility]. They do not want a yeast culture instead. 2.3.20 at 1109 bw          PFT interpretation:  Maneuver: valid and meets ATS guidelines  Normal spirometry

## 2023-04-03 ENCOUNTER — OFFICE VISIT (OUTPATIENT)
Dept: PHARMACY | Facility: CLINIC | Age: 23
End: 2023-04-03
Payer: COMMERCIAL

## 2023-04-03 ENCOUNTER — OFFICE VISIT (OUTPATIENT)
Dept: PULMONOLOGY | Facility: CLINIC | Age: 23
End: 2023-04-03
Attending: PHYSICIAN ASSISTANT
Payer: COMMERCIAL

## 2023-04-03 ENCOUNTER — LAB (OUTPATIENT)
Dept: LAB | Facility: CLINIC | Age: 23
End: 2023-04-03
Payer: COMMERCIAL

## 2023-04-03 VITALS
HEIGHT: 68 IN | DIASTOLIC BLOOD PRESSURE: 79 MMHG | HEART RATE: 97 BPM | OXYGEN SATURATION: 96 % | WEIGHT: 164.9 LBS | BODY MASS INDEX: 24.99 KG/M2 | SYSTOLIC BLOOD PRESSURE: 128 MMHG | RESPIRATION RATE: 17 BRPM

## 2023-04-03 DIAGNOSIS — E84.9 CYSTIC FIBROSIS (H): ICD-10-CM

## 2023-04-03 DIAGNOSIS — E84.9 CF (CYSTIC FIBROSIS) (H): Primary | ICD-10-CM

## 2023-04-03 DIAGNOSIS — E84.9 CF (CYSTIC FIBROSIS) (H): ICD-10-CM

## 2023-04-03 DIAGNOSIS — E84.0 CYSTIC FIBROSIS WITH PULMONARY MANIFESTATIONS (H): ICD-10-CM

## 2023-04-03 DIAGNOSIS — K86.81 EXOCRINE PANCREATIC INSUFFICIENCY: Primary | ICD-10-CM

## 2023-04-03 LAB
ALBUMIN SERPL BCG-MCNC: 4.8 G/DL (ref 3.5–5.2)
ALP SERPL-CCNC: 103 U/L (ref 40–129)
ALT SERPL W P-5'-P-CCNC: 34 U/L (ref 10–50)
ANION GAP SERPL CALCULATED.3IONS-SCNC: 13 MMOL/L (ref 7–15)
AST SERPL W P-5'-P-CCNC: 29 U/L (ref 10–50)
BILIRUB DIRECT SERPL-MCNC: <0.2 MG/DL (ref 0–0.3)
BILIRUB SERPL-MCNC: 0.4 MG/DL
BUN SERPL-MCNC: 13.2 MG/DL (ref 6–20)
CALCIUM SERPL-MCNC: 9.9 MG/DL (ref 8.6–10)
CHLORIDE SERPL-SCNC: 103 MMOL/L (ref 98–107)
CHOLEST SERPL-MCNC: 138 MG/DL
CK SERPL-CCNC: 112 U/L (ref 39–308)
CREAT SERPL-MCNC: 0.85 MG/DL (ref 0.67–1.17)
DEPRECATED CALCIDIOL+CALCIFEROL SERPL-MC: 18 UG/L (ref 20–75)
DEPRECATED HCO3 PLAS-SCNC: 25 MMOL/L (ref 22–29)
EXPTIME-PRE: 6.35 SEC
FEF2575-%PRED-PRE: 104 %
FEF2575-PRE: 4.94 L/SEC
FEF2575-PRED: 4.72 L/SEC
FEFMAX-%PRED-PRE: 121 %
FEFMAX-PRE: 11.84 L/SEC
FEFMAX-PRED: 9.71 L/SEC
FEV1-%PRED-PRE: 97 %
FEV1-PRE: 4.26 L
FEV1FEV6-PRE: 87 %
FEV1FEV6-PRED: 84 %
FEV1FVC-PRE: 86 %
FEV1FVC-PRED: 85 %
FIFMAX-PRE: 8.95 L/SEC
FVC-%PRED-PRE: 95 %
FVC-PRE: 4.96 L
FVC-PRED: 5.17 L
GFR SERPL CREATININE-BSD FRML MDRD: >90 ML/MIN/1.73M2
GLUCOSE SERPL-MCNC: 103 MG/DL (ref 70–99)
HDLC SERPL-MCNC: 40 MG/DL
LDLC SERPL CALC-MCNC: 49 MG/DL
NONHDLC SERPL-MCNC: 98 MG/DL
POTASSIUM SERPL-SCNC: 4.3 MMOL/L (ref 3.4–5.3)
PROT SERPL-MCNC: 7.5 G/DL (ref 6.4–8.3)
SODIUM SERPL-SCNC: 141 MMOL/L (ref 136–145)
TRIGL SERPL-MCNC: 246 MG/DL

## 2023-04-03 PROCEDURE — 99207 PR NO CHARGE LOS: CPT | Performed by: PHARMACIST

## 2023-04-03 PROCEDURE — 99000 SPECIMEN HANDLING OFFICE-LAB: CPT | Performed by: PATHOLOGY

## 2023-04-03 PROCEDURE — 80053 COMPREHEN METABOLIC PANEL: CPT | Performed by: PATHOLOGY

## 2023-04-03 PROCEDURE — 82550 ASSAY OF CK (CPK): CPT | Performed by: PATHOLOGY

## 2023-04-03 PROCEDURE — 80061 LIPID PANEL: CPT | Performed by: PATHOLOGY

## 2023-04-03 PROCEDURE — G0463 HOSPITAL OUTPT CLINIC VISIT: HCPCS | Performed by: PHYSICIAN ASSISTANT

## 2023-04-03 PROCEDURE — 87070 CULTURE OTHR SPECIMN AEROBIC: CPT | Performed by: PHYSICIAN ASSISTANT

## 2023-04-03 PROCEDURE — 84590 ASSAY OF VITAMIN A: CPT | Mod: 90 | Performed by: PATHOLOGY

## 2023-04-03 PROCEDURE — 99214 OFFICE O/P EST MOD 30 MIN: CPT | Mod: 25 | Performed by: PHYSICIAN ASSISTANT

## 2023-04-03 PROCEDURE — 94375 RESPIRATORY FLOW VOLUME LOOP: CPT | Performed by: PHYSICIAN ASSISTANT

## 2023-04-03 PROCEDURE — 82306 VITAMIN D 25 HYDROXY: CPT | Performed by: PHYSICIAN ASSISTANT

## 2023-04-03 PROCEDURE — 82248 BILIRUBIN DIRECT: CPT | Performed by: PATHOLOGY

## 2023-04-03 PROCEDURE — 36415 COLL VENOUS BLD VENIPUNCTURE: CPT | Performed by: PATHOLOGY

## 2023-04-03 RX ORDER — SODIUM CHLORIDE FOR INHALATION 7 %
4 VIAL, NEBULIZER (ML) INHALATION 2 TIMES DAILY
Qty: 240 ML | Refills: 11 | Status: SHIPPED | OUTPATIENT
Start: 2023-04-03 | End: 2023-07-14

## 2023-04-03 RX ORDER — ELEXACAFTOR, TEZACAFTOR, AND IVACAFTOR 100-50-75
KIT ORAL
Qty: 84 TABLET | Refills: 2 | Status: SHIPPED | OUTPATIENT
Start: 2023-04-03 | End: 2023-07-14

## 2023-04-03 RX ORDER — ALBUTEROL SULFATE 90 UG/1
AEROSOL, METERED RESPIRATORY (INHALATION)
Qty: 8.5 G | Refills: 3 | Status: SHIPPED | OUTPATIENT
Start: 2023-04-03

## 2023-04-03 RX ORDER — AZITHROMYCIN 500 MG/1
500 TABLET, FILM COATED ORAL
Qty: 6 TABLET | Refills: 11 | Status: SHIPPED | OUTPATIENT
Start: 2023-04-03 | End: 2023-09-25

## 2023-04-03 ASSESSMENT — PAIN SCALES - GENERAL: PAINLEVEL: NO PAIN (0)

## 2023-04-03 NOTE — CONFIDENTIAL NOTE
Respiratory Therapist Note:    ALBUTEROL SHORTAGE PLAN - will continue Duoneb for now, options to change to home Albuterol MDI, or can change to Combivent MDI as needed.     Vest                Brand: Hill-Rom - traditional Hill Rom: Frequencies 8, 9, 10 at pressure 10 then frequencies 18, 19, 20 at pressure 6, uses in high to low order.                Cough Pause: Cough Pause; Yes                Vest Garment Size: Adult Small                Last Fitting Date: fits, due at next visit with recent weight gain                Frequency of therapy: 7 times per week                Concerns: please bring garment to next appointment to assess fit         Exercise (purposeful and aerobic for >20 minutes each session): Yes - active job, not purposeful outside exercise                Does this qualify as additional airway clearance: Yes         Alternative Airway Clearance: none       Nebulized Medications                Bronchodilators: Albuterol and Atrovent (Duoneb)                Mucolytic: Pulmozyme and 7% Hypertonic Saline                Antibiotics: none                Additional Inhaled Medications: MDI - has albuterol at home                Spacer Use: yes         Review Cleaning: Yes. Top rack of .         Education and Transition Information                Correct order of inhaled medications: Yes                Mechanism of Action of inhaled medications: Yes                Frequency of inhaled medications: Yes                Dosage of inhaled medications: Yes                Other:          Home Care:                Nebulizer Cups (Brand/Type): Nery LC                Nebulizer Compressor                            Year Purchased: Old, taking long time to complete inhaled medications. Will replace today with Nery ProNeb Max                            [facility], Phone: [phone], Fax: [phone]                Nebulizer Supply Company:                            [facility],  Phone: [phone], Fax: [phone]         Oxygen: none    Pulmonary Rehab: none     Plan of Care and Goals for next visit: will replace neb machine from [facility] today, and got additional neb supplies ordered. Will send [employer] to confirm neb machine and supply mailing, and to ensure plan with inhaled medications.

## 2023-04-03 NOTE — PATIENT INSTRUCTIONS
See provider AVS for a summary of recommendations from today's visit.  Liss Croft, PharmD  Cystic Fibrosis MTM Pharmacist  [facility]

## 2023-04-03 NOTE — PATIENT INSTRUCTIONS
Cystic Fibrosis Self-Care Plan       Patient: Keon Conway   MRN: [id number]   Clinic Date: April 3, 2023     RECOMMENDATIONS:  1. Continue nebulizers and vest therapy. Okay to decrease vesting and stop hypertonic saline (continue Pulmozyme).   2. Fast labs and glucose test with next visit.    Annual Studies:   IGG   Date Value Ref Range Status   12/07/2020 979 610 - 1,616 mg/dL Final     Immunoglobulin G   Date Value Ref Range Status   12/07/2021 999 610-1,616 mg/dL Final     Insulin   Date Value Ref Range Status   12/07/2021 13.8 3.0 - 25.0 mU/L Final   05/08/2019 5.7 3 - 25 mU/L Final     There are no preventive care reminders to display for this patient.    Pulmonary Function Tests  FEV1: amount of air you can blow out in 1 second  FVC: total amount of air you can take in and blow out    Your Goals:             Latest Ref Rng & Units 4/3/2023     8:12 AM   PFT   FVC L 4.96  P   FEV1 L 4.26  P   FVC% % 95  P   FEV1% % 97  P      P Preliminary result          Airway Clearance: The Most Important Way to Keep Your Lungs Healthy  Vest Settings:   Hill-Rom Frequencies: 8, 9, 10 Pressure 10 Then, Frequencies 18, 19, 20 Pressure 6     RespirTech: Quick Start with Pressure of     Do each frequency for 5 minutes; Deflate vest after each frequency & cough 3 times before beginning the next setting.    Vest and Neb Therapy should be done 1 times/day.    Good Nutrition Can Improve Lung Function and Overall Health    Take ALL of your vitamins with food    Take 1/2 of your enzymes before EVERY meal/snack and the other 1/2 mid-meal/snack    Wt Readings from Last 3 Encounters:   04/03/23 74.8 kg (164 lb 14.5 oz)   07/28/22 70.9 kg (156 lb 4.9 oz)   03/01/22 69.4 kg (153 lb)       Body mass index is 25.28 kg/m .         National CF Foundation Recommendations for BMI in CF Adults: Women: at least 22 Men: at least 23        Controlling Blood Sugars Helps Prevent Lung Infections & Improves Nutrition  Test blood sugar:     In the morning before eating (goal is )    2 hours after a meal (goal is less than 150)    When pre-meal glucose is greater than 150 add correction    At bedtime (if less than 100 eat a snack with 15 grams of carbohydrates  Last A1C Results:   Hemoglobin A1C   Date Value Ref Range Status   12/07/2021 5.2 0.0 - 5.6 % Final     Comment:     Normal <5.7%   Prediabetes 5.7-6.4%    Diabetes 6.5% or higher     Note: Adopted from ADA consensus guidelines.   12/07/2020 5.1 0 - 5.6 % Final     Comment:     Normal <5.7% Prediabetes 5.7-6.4%  Diabetes 6.5% or higher - adopted from ADA   consensus guidelines.           If diabetic, measure A1C every 6 months. Goal is under 7%.    Staying Healthy  Research: If you are interested in learning about research opportunities or have questions, please contact Lily Daniels at [phone] or [email or web address].     Foundation: Compass is a personalized resource service to help you with the insurance, financial, legal and other issues you are facing.  It's free, confidential and available to anyone with CF.  Ask your  for more information or contact Compass directly at [facility] ([phone]) or compass@cff.org, or learn more at cff.org/compass.       CF Nurse Line: Rodney Ken: [phone]  Marcelina Sales, RT: [phone]    Kaye Mendes , Dieticians: [phone]    Mindy Pedro, Diabetes Nurse: [phone]   Felicia Garcia: [phone] or Lakisha Lay at [phone], Social Workers  [email or web address]

## 2023-04-03 NOTE — LETTER
4/3/2023         RE: Keon Conway  [address]  [place] [address]        Dear Colleague,    Thank you for referring your patient, Keon Conway, to the [facility] [place]. Please see a copy of my visit note below.    [facility]  April 3, 2023         Assessment and Plan:   Keon Conway is a 23 year old male with cystic fibrosis who is seen today in clinic for routine follow up. He was last seen in July 2022.     1. CF lung disease: no new pulmonary complaints, has been consistent with his Trikafta use. Denies any illnesses since his follow up last summer. Sating 96% on room air; vesting 1 time per day. PFTs stable at his baseline. Previous cultures have grown out MSSA, H. Influenzae and Ps A. No evidence of exacerbation.    - Continue current nebulizers and vest therapy, okay to stop HTS and continue Pulmozyme; could probably stop nebs and vesting with how active he is, but needs to consistently come to clinic for monitoring  - Chronic azithromycin 500 mg three times/week     2. CFTR modulation: back on Trikafta and tolerating it well. Labs today WNL.   - Continue Trikafa     3. Reactive hypoglycemia: AIC of 5.2 on 12/7/21 with fasting BS < 100, but significant post hypoglycemia down to 26, mild symptoms. Hasn't been checking BS, hasn't felt like he has any low BS.   - Added on AIC to today  - OGTT with next f/u    4. Exocrine pancreatic insufficiency: no s/s of malabsorption. BMI > CFF goal.   - Continue pancreatic enzymes and vitamin supplementation  - Added on some labs to today     5. Hypertriglyceridemia: elevated since 2005, level in January was fasting and remains elevated, but lower than prior levels.  - Cholesterol panel added on to today    RTC: 3-4 months  Annual: overdue (added on as many labs as possible, other labs, CXR and OGTT with next visit; DEXA due  12/2023  Vaccinations: declines covid vaccine    Nita Cedeño PA-C  Pulmonary, Allergy, Critical Care and Sleep Medicine        Interval History:     Feeling well, no cough, no shortness of breath, no congestion or tightness. . No sicknesses over the last 9 months. Vesting in the morning with nebs. 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 No bloating or gas, has about 1 stool/day, sinks. No grease or fat. Does not check BS, hasn't done so since last summer, denies feeling of low BS. Not excessively thirsty or urinating frequently.          Review of Systems:   Please see HPI. Otherwise, complete 10 point ROS negative.           Past Medical and Surgical History:     Past Medical History:   Diagnosis Date     CF (cystic fibrosis) (H) 11/30/2011     Chronic maxillary sinusitis 11/30/2011     Exocrine pancreatic insufficiency 11/30/2011     Past Surgical History:   Procedure Laterality Date     BRONCHOSCOPY (RIGID OR FLEXIBLE), DIAGNOSTIC N/A 10/2/2017    Procedure: COMBINED BRONCHOSCOPY (RIGID OR FLEXIBLE), LAVAGE;  Flexible Bronchoscopy With Lavage, PICC Line Placement ;  Surgeon: Darrel Dudley MD;  [facility]     CATARACT IOL, RT/LT Left      EXAM UNDER ANESTHESIA EYE(S) Left 3/17/2015    Procedure: EXAM UNDER ANESTHESIA EYE(S);  Surgeon: Aamir Taylor MD;  [facility]     HERNIA REPAIR  December 2014     INSERT PICC LINE Right 10/2/2017    Procedure: INSERT PICC LINE;;  Surgeon: Angeles Singh MD;  [facility]     LAPAROSCOPIC ASSISTED INSERTION TUBE GASTROTOMY N/A 10/16/2017    Procedure: LAPAROSCOPIC ASSISTED INSERTION TUBE GASTROSTOMY;  Laparoscopic Gastrostomy Tube Placement.;  Surgeon: Jeremy Obando MD;  [facility]     SCRUB ETHYLENEDIAMENETETRAACETIC (EDTA) Left 3/17/2015    Procedure: SCRUB ETHYLENEDIAMENETETRAACETIC (EDTA);  Surgeon: Aamir Taylor MD;  [facility]     SINUS SURGERY  3/2011           Family  "History:     Family History   Problem Relation Age of Onset     Diabetes Paternal Grandfather             Social History:     Social History     Socioeconomic History     Marital status: Single     Spouse name: Not on file     Number of children: Not on file     Years of education: Not on file     Highest education level: Not on file   Occupational History     Not on file   Tobacco Use     Smoking status: Never     Smokeless tobacco: Never   Vaping Use     Vaping status: Not on file   Substance and Sexual Activity     Alcohol use: No     Drug use: No     Sexual activity: Not on file   Other Topics Concern     Parent/sibling w/ CABG, MI or angioplasty before 65F 55M? Not Asked   Social History Narrative    Mom is 35, Dad 39, both healthy.     Social Determinants of Health     Financial Resource Strain: Not on file   Food Insecurity: Not on file   Transportation Needs: Not on file   Physical Activity: Not on file   Stress: Not on file   Social Connections: Not on file   Intimate Partner Violence: Not on file   Housing Stability: Not on file            Medications:     Current Outpatient Medications   Medication     albuterol (VENTOLIN HFA) 108 (90 Base) MCG/ACT inhaler     amylase-lipase-protease (CREON) 38189-30302 units CPEP per EC capsule     azithromycin (ZITHROMAX) 500 MG tablet     blood glucose (ACCU-CHEK GUIDE) test strip     blood glucose monitoring (ACCU-CHEK FASTCLIX) lancets     dornase alpha (PULMOZYME) 2.5 MG/2.5ML neb solution     elexacaftor-tezacaftor-ivacaftor & ivacaftor (TRIKAFTA) 100-50-75 & 150 MG tablet pack     ipratropium - albuterol 0.5 mg/2.5 mg/3 mL (DUONEB) 0.5-2.5 (3) MG/3ML neb solution     mvw complete formulation (SOFTGELS ) capsule     polyethylene glycol (MIRALAX) powder     sodium chloride inhalant (HYPERSAL) 7 % NEBU neb solution     No current facility-administered medications for this visit.            Physical Exam:   /79   Pulse 97   Resp 17   Ht 1.72 m (5' 7.72\") "   Wt 74.8 kg (164 lb 14.5 oz)   SpO2 96%   BMI 25.28 kg/m      GENERAL: alert, NAD  HEENT: NCAT, EOMI, anicteric sclera, no oral mucosal edema or erythema  Neck: no cervical or supraclavicular adenopathy  Respiratory: good air flow, mainly clear  CV: RRR, S1S2, no murmurs noted  Abdomen: normoactive BS, soft   Lymph: no edema  Neuro: AAO X 3, CN 2-12 grossly intact  Psychiatric: normal affect, good eye contact  Skin: no rash, jaundice or lesions on limited exam         Data:   All laboratory and imaging data reviewed.      Cystic Fibrosis Culture  Specimen Description   Date Value Ref Range Status   04/05/2021 Throat  Final   12/07/2020 Throat  Final   02/03/2020 Throat  Final    Culture Micro   Date Value Ref Range Status   04/05/2021 Moderate growth  Normal branden    Final   12/07/2020 Moderate growth  Normal branden    Final   02/03/2020 Canceled, Test credited  Incorrect specimen type    Final   02/03/2020   Final    Notification of test cancellation was given to  Mindy in [facility]. They do not want a yeast culture instead. 2.3.20 at 1109 bw          PFT interpretation:  Maneuver: valid and meets ATS guidelines  Normal spirometry      Again, thank you for allowing me to participate in the care of your patient.        Sincerely,        Nita Cedeño PA-C

## 2023-04-03 NOTE — NURSING NOTE
"Keon Conway is a 23 year old year old who is being seen for RECHECK (Return Cystic Fibrosis )      Medications reviewed and Vital signs taken.    Specimen Collection Type: Throat Swab    Order(s) placed: CF Aerobic Bacterial    *IF AFB order placed - please enter \"PRIORITIZE AFB\" to order comments.       No results found for: ACIDFAST      Lab Results   Component Value Date    AFBSMS Negative for acid fast bacteria 07/29/2019    AFBSMS  07/29/2019     A minimum of 5 mL of sputum or fluid is recommended for recovery of acid fast bacilli   (AFB).  Volumes less than 5 mL are suboptimal and may compromise recovery of AFB from   culture.      AFBSMS  07/29/2019     Assayed at [facility] [address] [place] [id number] [phone]         Medications reviewed and vital signs taken.   Lucila Iniguez CMA    "

## 2023-04-05 LAB
ANNOTATION COMMENT IMP: NORMAL
RETINYL PALMITATE SERPL-MCNC: 0.03 MG/L
VIT A SERPL-MCNC: 0.7 MG/L

## 2023-04-08 LAB — BACTERIA SPEC CULT: NORMAL

## 2023-04-15 ENCOUNTER — HEALTH MAINTENANCE LETTER (OUTPATIENT)
Age: 23
End: 2023-04-15

## 2023-07-14 DIAGNOSIS — E84.9 CYSTIC FIBROSIS (H): ICD-10-CM

## 2023-07-14 DIAGNOSIS — E84.0 CYSTIC FIBROSIS WITH PULMONARY MANIFESTATIONS (H): ICD-10-CM

## 2023-07-14 RX ORDER — ELEXACAFTOR, TEZACAFTOR, AND IVACAFTOR 100-50-75
KIT ORAL
Qty: 84 TABLET | Refills: 2 | Status: SHIPPED | OUTPATIENT
Start: 2023-07-14 | End: 2023-08-07

## 2023-07-14 RX ORDER — SODIUM CHLORIDE FOR INHALATION 7 %
4 VIAL, NEBULIZER (ML) INHALATION 2 TIMES DAILY
Qty: 240 ML | Refills: 11 | Status: SHIPPED | OUTPATIENT
Start: 2023-07-14 | End: 2023-08-07

## 2023-08-06 NOTE — PROGRESS NOTES
[facility]  August 7, 2023         Assessment and Plan:   Keon Conway is a 23 year old male with cystic fibrosis who is seen today in clinic for routine follow up.     1. CF lung disease: no new pulmonary complaints, has been consistent with his Trikafta use. 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 Sating 99% on room air; vesting 1 time per day. PFTs stable at his baseline best. Previous cultures have grown out MSSA, H. Influenzae and Ps A. No evidence of exacerbation.    - Okay to change nebs and vesting to PRN  - Chronic azithromycin 500 mg three times/week     2. CFTR modulation: on Trikafta and tolerating it well. Labs today WNL.   - Continue Trikafa     3. Reactive hypoglycemia:   Fasting hyperglycemia:  Abnormal OGTT: AIC pending for today.  Fasting BS of 110, peaked at 230 and down to 47, symptomatic. Does have a glucometer at home, but does not check BS. BHARATH Restrepo, to see today.  - OGTT with next f/u    4. Exocrine pancreatic insufficiency: no s/s of malabsorption. Vitamin D level low in April. BMI > CFF goal.   - Continue pancreatic enzymes and vitamin supplementation     5. Hypertriglyceridemia: elevated since 2005,  stable.     6. HCM: reviewed with the patient today including normal CBC, BMP completed 4/3 WNL, hepatic panel today is normal, CK WNL.  CXR reviewed and demonstrates stable changes of transplant. Multiple labs pending    RTC: 4 months  Annual: August 2024; DEXA due 12/2023  Vaccinations: declines covid vaccine    Nita Cedeño PA-C  Pulmonary, Allergy, Critical Care and Sleep Medicine        Interval History:     No recent illnesses, no cough or shortness of breath. Continues to vest once/day, doesn't notice much of a difference. No bloating or gas, stools sink.          Review of Systems:   Please see HPI. Otherwise, complete 10 point ROS negative.           Past Medical and Surgical History:     Past  Medical History:   Diagnosis Date    CF (cystic fibrosis) (H) 11/30/2011    Chronic maxillary sinusitis 11/30/2011    Exocrine pancreatic insufficiency 11/30/2011     Past Surgical History:   Procedure Laterality Date    BRONCHOSCOPY (RIGID OR FLEXIBLE), DIAGNOSTIC N/A 10/2/2017    Procedure: COMBINED BRONCHOSCOPY (RIGID OR FLEXIBLE), LAVAGE;  Flexible Bronchoscopy With Lavage, PICC Line Placement ;  Surgeon: Darrel Dudley MD;  Location: [facility] OR    CATARACT IOL, RT/LT Left     EXAM UNDER ANESTHESIA EYE(S) Left 3/17/2015    Procedure: EXAM UNDER ANESTHESIA EYE(S);  Surgeon: Aamir Taylor MD;  Location: [facility] OR    HERNIA REPAIR  December 2014    INSERT PICC LINE Right 10/2/2017    Procedure: INSERT PICC LINE;;  Surgeon: Angeles Singh MD;  Location: [facility] OR    LAPAROSCOPIC ASSISTED INSERTION TUBE GASTROTOMY N/A 10/16/2017    Procedure: LAPAROSCOPIC ASSISTED INSERTION TUBE GASTROSTOMY;  Laparoscopic Gastrostomy Tube Placement.;  Surgeon: Jeremy Obando MD;  Location[facility] OR    SCRUB ETHYLENEDIAMENETETRAACETIC (EDTA) Left 3/17/2015    Procedure: SCRUB ETHYLENEDIAMENETETRAACETIC (EDTA);  Surgeon: Aamir Taylor MD;  [facility]: [facility] OR    SINUS SURGERY  3/2011           Family History:     Family History   Problem Relation Age of Onset    Diabetes Paternal Grandfather             Social History:     Social History     Socioeconomic History    Marital status: Single     Spouse name: Not on file    Number of children: Not on file    Years of education: Not on file    Highest education level: Not on file   Occupational History    Not on file   Tobacco Use    Smoking status: Never    Smokeless tobacco: Never   Substance and Sexual Activity    Alcohol use: No    Drug use: No    Sexual activity: Not on file   Other Topics Concern    Parent/sibling w/ CABG, MI or angioplasty before 65F 55M? Not Asked   Social History Narrative    Mom is 35, Dad 39, both healthy.     Social Determinants of Health     Financial  Resource Strain: Not on file   Food Insecurity: Not on file   Transportation Needs: Not on file   Physical Activity: Not on file   Stress: Not on file   Social Connections: Not on file   Intimate Partner Violence: Not on file   Housing Stability: Not on file            Medications:     Current Outpatient Medications   Medication    elexacaftor-tezacaftor-ivacaftor & ivacaftor (TRIKAFTA) 100-50-75 & 150 MG tablet pack    albuterol (VENTOLIN HFA) 108 (90 Base) MCG/ACT inhaler    amylase-lipase-protease (CREON) 03975-68255 units CPEP per EC capsule    azithromycin (ZITHROMAX) 500 MG tablet    blood glucose (ACCU-CHEK GUIDE) test strip    blood glucose monitoring (ACCU-CHEK FASTCLIX) lancets    dornase gloria (PULMOZYME) 2.5 MG/2.5ML neb solution    ipratropium - albuterol 0.5 mg/2.5 mg/3 mL (DUONEB) 0.5-2.5 (3) MG/3ML neb solution    mvw complete formulation (SOFTGELS ) capsule     No current facility-administered medications for this visit.            Physical Exam:   /76   Pulse 87   Temp 97.9  F (36.6  C)   Wt 73.4 kg (161 lb 13.1 oz)   SpO2 99%   BMI 24.81 kg/m      GENERAL: alert, NAD  HEENT: NCAT, EOMI, anicteric sclera, no oral mucosal edema or erythema  Neck: no cervical or supraclavicular adenopathy  Respiratory: good air flow, mainly clear  CV: RRR, S1S2, no murmurs noted  Abdomen: normoactive BS, soft   Lymph: no edema  Neuro: AAO X 3, CN 2-12 grossly intact  Psychiatric: normal affect, good eye contact  Skin: no rash, jaundice or lesions on limited exam         Data:   All laboratory and imaging data reviewed.      Cystic Fibrosis Culture  Specimen Description   Date Value Ref Range Status   04/05/2021 Throat  Final   12/07/2020 Throat  Final   02/03/2020 Throat  Final    Culture Micro   Date Value Ref Range Status   04/05/2021 Moderate growth  Normal branden    Final   12/07/2020 Moderate growth  Normal branden    Final   02/03/2020 Canceled, Test credited  Incorrect specimen type    Final    02/03/2020   Final    Notification of test cancellation was given to  Mindy in [facility]. They do not want a yeast culture instead. 2.3.20 at 1109 bw          PFT interpretation:  Maneuver: valid and meets ATS guidelines  Normal spirometry

## 2023-08-07 ENCOUNTER — ANCILLARY PROCEDURE (OUTPATIENT)
Dept: GENERAL RADIOLOGY | Facility: CLINIC | Age: 23
End: 2023-08-07
Attending: PHYSICIAN ASSISTANT
Payer: COMMERCIAL

## 2023-08-07 ENCOUNTER — ALLIED HEALTH/NURSE VISIT (OUTPATIENT)
Dept: CARE COORDINATION | Facility: CLINIC | Age: 23
End: 2023-08-07

## 2023-08-07 ENCOUNTER — APPOINTMENT (OUTPATIENT)
Dept: LAB | Facility: CLINIC | Age: 23
End: 2023-08-07
Attending: PHYSICIAN ASSISTANT
Payer: COMMERCIAL

## 2023-08-07 ENCOUNTER — INFUSION THERAPY VISIT (OUTPATIENT)
Dept: INFUSION THERAPY | Facility: CLINIC | Age: 23
End: 2023-08-07
Attending: PHYSICIAN ASSISTANT
Payer: COMMERCIAL

## 2023-08-07 ENCOUNTER — OFFICE VISIT (OUTPATIENT)
Dept: PULMONOLOGY | Facility: CLINIC | Age: 23
End: 2023-08-07
Attending: PHYSICIAN ASSISTANT
Payer: COMMERCIAL

## 2023-08-07 ENCOUNTER — CLINICAL UPDATE (OUTPATIENT)
Dept: PHARMACY | Facility: CLINIC | Age: 23
End: 2023-08-07

## 2023-08-07 VITALS
TEMPERATURE: 97.9 F | SYSTOLIC BLOOD PRESSURE: 124 MMHG | HEART RATE: 87 BPM | DIASTOLIC BLOOD PRESSURE: 76 MMHG | OXYGEN SATURATION: 99 % | WEIGHT: 161.82 LBS | BODY MASS INDEX: 24.81 KG/M2

## 2023-08-07 VITALS
BODY MASS INDEX: 24.81 KG/M2 | TEMPERATURE: 97.9 F | DIASTOLIC BLOOD PRESSURE: 76 MMHG | OXYGEN SATURATION: 99 % | SYSTOLIC BLOOD PRESSURE: 124 MMHG | HEART RATE: 87 BPM | RESPIRATION RATE: 18 BRPM | WEIGHT: 161.8 LBS

## 2023-08-07 DIAGNOSIS — E84.9 CF (CYSTIC FIBROSIS) (H): Primary | ICD-10-CM

## 2023-08-07 DIAGNOSIS — Z13.9 RISK AND FUNCTIONAL ASSESSMENT: Primary | ICD-10-CM

## 2023-08-07 DIAGNOSIS — E84.0 CYSTIC FIBROSIS WITH PULMONARY MANIFESTATIONS (H): Primary | ICD-10-CM

## 2023-08-07 DIAGNOSIS — E84.9 CYSTIC FIBROSIS (H): ICD-10-CM

## 2023-08-07 DIAGNOSIS — E84.9 CF (CYSTIC FIBROSIS) (H): ICD-10-CM

## 2023-08-07 LAB
ALBUMIN SERPL BCG-MCNC: 4.9 G/DL (ref 3.5–5.2)
ALBUMIN UR-MCNC: NEGATIVE MG/DL
ALP SERPL-CCNC: 94 U/L (ref 40–129)
ALT SERPL W P-5'-P-CCNC: 20 U/L (ref 0–70)
APPEARANCE UR: CLEAR
AST SERPL W P-5'-P-CCNC: 20 U/L (ref 0–45)
BILIRUB DIRECT SERPL-MCNC: <0.2 MG/DL (ref 0–0.3)
BILIRUB SERPL-MCNC: 0.6 MG/DL
BILIRUB UR QL STRIP: NEGATIVE
CK SERPL-CCNC: 99 U/L (ref 39–308)
COLOR UR AUTO: NORMAL
ERYTHROCYTE [DISTWIDTH] IN BLOOD BY AUTOMATED COUNT: 12 % (ref 10–15)
EXPTIME-PRE: 7.31 SEC
FEF2575-%PRED-PRE: 100 %
FEF2575-PRE: 4.51 L/SEC
FEF2575-PRED: 4.48 L/SEC
FEFMAX-%PRED-PRE: 120 %
FEFMAX-PRE: 11.72 L/SEC
FEFMAX-PRED: 9.71 L/SEC
FEV1-%PRED-PRE: 105 %
FEV1-PRE: 4.28 L
FEV1FEV6-PRE: 85 %
FEV1FEV6-PRED: 84 %
FEV1FVC-PRE: 84 %
FEV1FVC-PRED: 86 %
FIFMAX-PRE: 9.03 L/SEC
FVC-%PRED-PRE: 106 %
FVC-PRE: 5.07 L
FVC-PRED: 4.76 L
GLUCOSE BLDC GLUCOMTR-MCNC: 101 MG/DL (ref 70–99)
GLUCOSE BLDC GLUCOMTR-MCNC: 39 MG/DL (ref 70–99)
GLUCOSE BLDC GLUCOMTR-MCNC: 41 MG/DL (ref 70–99)
GLUCOSE BLDC GLUCOMTR-MCNC: 73 MG/DL (ref 70–99)
GLUCOSE SERPL-MCNC: 110 MG/DL (ref 70–99)
GLUCOSE SERPL-MCNC: 162 MG/DL (ref 70–99)
GLUCOSE SERPL-MCNC: 230 MG/DL (ref 70–99)
GLUCOSE SERPL-MCNC: 232 MG/DL (ref 70–99)
GLUCOSE SERPL-MCNC: 47 MG/DL (ref 70–99)
GLUCOSE UR STRIP-MCNC: NEGATIVE MG/DL
HBA1C MFR BLD: 5.2 %
HCT VFR BLD AUTO: 43.6 % (ref 40–53)
HGB BLD-MCNC: 15.4 G/DL (ref 13.3–17.7)
HGB UR QL STRIP: NEGATIVE
INSULIN SERPL-ACNC: 145 UU/ML (ref 2.6–24.9)
INSULIN SERPL-ACNC: 158 UU/ML (ref 2.6–24.9)
INSULIN SERPL-ACNC: 26.5 UU/ML (ref 2.6–24.9)
INSULIN SERPL-ACNC: 28.2 UU/ML (ref 2.6–24.9)
INSULIN SERPL-ACNC: 9 UU/ML (ref 2.6–24.9)
KETONES UR STRIP-MCNC: NEGATIVE MG/DL
LEUKOCYTE ESTERASE UR QL STRIP: NEGATIVE
MCH RBC QN AUTO: 33.3 PG (ref 26.5–33)
MCHC RBC AUTO-ENTMCNC: 35.3 G/DL (ref 31.5–36.5)
MCV RBC AUTO: 94 FL (ref 78–100)
NITRATE UR QL: NEGATIVE
PH UR STRIP: 6 [PH] (ref 5–7)
PLATELET # BLD AUTO: 361 10E3/UL (ref 150–450)
PROT SERPL-MCNC: 7.3 G/DL (ref 6.4–8.3)
RBC # BLD AUTO: 4.63 10E6/UL (ref 4.4–5.9)
SP GR UR STRIP: 1.02 (ref 1–1.03)
UROBILINOGEN UR STRIP-MCNC: NORMAL MG/DL
WBC # BLD AUTO: 5.9 10E3/UL (ref 4–11)

## 2023-08-07 PROCEDURE — 250N000009 HC RX 250: Performed by: PHYSICIAN ASSISTANT

## 2023-08-07 PROCEDURE — 87070 CULTURE OTHR SPECIMN AEROBIC: CPT | Performed by: PHYSICIAN ASSISTANT

## 2023-08-07 PROCEDURE — 83525 ASSAY OF INSULIN: CPT | Performed by: PHYSICIAN ASSISTANT

## 2023-08-07 PROCEDURE — 36415 COLL VENOUS BLD VENIPUNCTURE: CPT

## 2023-08-07 PROCEDURE — 80076 HEPATIC FUNCTION PANEL: CPT

## 2023-08-07 PROCEDURE — 83036 HEMOGLOBIN GLYCOSYLATED A1C: CPT

## 2023-08-07 PROCEDURE — 82947 ASSAY GLUCOSE BLOOD QUANT: CPT | Performed by: PHYSICIAN ASSISTANT

## 2023-08-07 PROCEDURE — 84446 ASSAY OF VITAMIN E: CPT

## 2023-08-07 PROCEDURE — 94375 RESPIRATORY FLOW VOLUME LOOP: CPT | Performed by: PHYSICIAN ASSISTANT

## 2023-08-07 PROCEDURE — 81003 URINALYSIS AUTO W/O SCOPE: CPT

## 2023-08-07 PROCEDURE — 99214 OFFICE O/P EST MOD 30 MIN: CPT | Mod: 25 | Performed by: PHYSICIAN ASSISTANT

## 2023-08-07 PROCEDURE — 85027 COMPLETE CBC AUTOMATED: CPT

## 2023-08-07 PROCEDURE — 71045 X-RAY EXAM CHEST 1 VIEW: CPT | Mod: GC | Performed by: RADIOLOGY

## 2023-08-07 PROCEDURE — 99207 PR NO CHARGE LOS: CPT | Performed by: PHARMACIST

## 2023-08-07 PROCEDURE — 82550 ASSAY OF CK (CPK): CPT

## 2023-08-07 PROCEDURE — 82962 GLUCOSE BLOOD TEST: CPT

## 2023-08-07 PROCEDURE — G0463 HOSPITAL OUTPT CLINIC VISIT: HCPCS | Performed by: PHYSICIAN ASSISTANT

## 2023-08-07 RX ORDER — ELEXACAFTOR, TEZACAFTOR, AND IVACAFTOR 100-50-75
KIT ORAL
Qty: 84 TABLET | Refills: 5 | Status: SHIPPED | OUTPATIENT
Start: 2023-08-07 | End: 2024-03-01

## 2023-08-07 RX ADMIN — ALCOHOL 75 G: 65 GEL TOPICAL at 07:05

## 2023-08-07 ASSESSMENT — ANXIETY QUESTIONNAIRES
2. NOT BEING ABLE TO STOP OR CONTROL WORRYING: NOT AT ALL
5. BEING SO RESTLESS THAT IT IS HARD TO SIT STILL: NOT AT ALL
GAD7 TOTAL SCORE: 0
1. FEELING NERVOUS, ANXIOUS, OR ON EDGE: NOT AT ALL
GAD7 TOTAL SCORE: 0
7. FEELING AFRAID AS IF SOMETHING AWFUL MIGHT HAPPEN: NOT AT ALL
6. BECOMING EASILY ANNOYED OR IRRITABLE: NOT AT ALL
3. WORRYING TOO MUCH ABOUT DIFFERENT THINGS: NOT AT ALL

## 2023-08-07 ASSESSMENT — PATIENT HEALTH QUESTIONNAIRE - PHQ9
SUM OF ALL RESPONSES TO PHQ QUESTIONS 1-9: 0
5. POOR APPETITE OR OVEREATING: NOT AT ALL

## 2023-08-07 NOTE — PROGRESS NOTES
Clinical Update:                                                      A chart review was conducted for Keon Conway.      Reason for Consult: Trikafta Quarterly Lab Monitoring 4/4    Discussion: Keon originally started Trikafta in November 2019. However he reported going off Trikafta ~12/2021 before having a decline in lung function and restarting 3/2022. Per chart review Keon is taking full dose Trikafta at this time.     Labs were reviewed from 8/7/23 at [facility]. Hepatic panel and CK are within normal limits.   Lab Results   Component Value Date    ALT 20 08/07/2023    AST 20 08/07/2023    BILITOTAL 0.6 08/07/2023    DBIL <0.20 08/07/2023    CKT 99 08/07/2023     Keon has now completed 1 year or quarterly lab monitoring, recheck hepatic panel and CK in 6 months      Plan:  1. Continue Trikafta  2. Re-check hepatic panel and CK in 6 months      Mirian Mederos, PharmD  Cystic Fibrosis MTM Pharmacist  [facility]  Voicemail: [phone]

## 2023-08-07 NOTE — PATIENT INSTRUCTIONS
Dear Keon Conway    Thank you for choosing [facility] ([facility]) for your procedure.  The following information is a summary of our appointment as well as important reminders.      We look forward in seeing you on your next appointment here at [facility]).  Please don t hesitate to call us at [phone] to reschedule any of your appointments or to speak with one of the [facility] registered nurses.  It was a pleasure taking care of you today.    Sincerely,    [facility]  [address] [place] [address]  [address]  Phone:  [phone]

## 2023-08-07 NOTE — PROGRESS NOTES
"Adult Cystic Fibrosis Program  Annual Psychosocial Assessment    Presenting Information:  KEON is an 23-year-old male with cystic fibrosis, presenting in CF clinic for a follow up with CF provider, Nita Rivas.  Met with Keon for annual psychosocial assessment. His girlfriend, Miri, was present for the visit.     Living situation:  Keon lives with his parents, brother, and his girlfriend (Miri) in [place], [place].  His parents own the home. They have 1 dog, a 2 yo bassett hound named Josue. He does not currently pay rent. He denies any related concerns. Keon bought some land and hopes to start building a house with Miri in the next year.     Family Constellation:  Keon was raised by his biological parents. He has 1 sibling(s): an older brother.  He notes that all four of his grandparents are still living. His extended family all lives close by. He is not aware of anyone else in his family having CF. He reports his family members are all doing well.      Social Support:  Keon reports good social support.  He gets along well with family members and draws additional support from his girlfriend of 7 years (Miri) and friends. He does not have any connections in the CF Community at this time.     Adjustment to Illness:  Keon was diagnosed with CF at age 18 months. He reports being somewhat sick often during his childhood with some hospitalizations.     Today he describes his current health status as \"good\". He continues to take trikafta which is going well. Clinically, he has mild lung disease and pancreatic insufficiency. He typically does 1 vest treatment(s) per day. He does not formally exercise but notes being constantly on the move and doing heavy lifting at work. He denies any health problems that interfere with activities of daily living.     Keon is open about his CF diagnosis.          Health Care Directive:  Keon has previously received Health Care Directive education. This SW provided " overview of education, including concept/purpose of health care directive, default health care agents and how to complete a directive. Keon is comfortable with his default health care agent(s) (parents) in absence of a directive. He is not currently interested in reviewing a health care directive.     Education:  Keon has completed high school. He does not currently have plans for further education. He is aware that scholarships are available if he changes his mind.     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 He denies related employment concerns at this time.     His girlfriend, Miri, works cleaning houses. Last year she was with him up at [employer] for the winter.     Finances:  Keon receives income from wages and also relies on some parental support. He and his parents share expenses and they pay his medical bills. He denies any financial concerns at this time.     Insurance:  Keon is insured through his mother's employer policy as well as [facility] He denies any related concerns at this time. He was encouraged to call SW to report changes/questions/concerns/needs should they arise.    Mental Health/Coping:  Keon denies any current or past symptoms indicative of mood, anxiety, eating, learning or other mental health disorder. He reports a positive and stable mood recently.    Keon was administered the following screens today in clinic:  RADHA-7: score of 0, indicating an absence of anxiety symptoms.   PHQ-9: score of 0, indicating an absence of depression symptoms.     He does not take any medications for mental health and does not see a therapist.  "Keon reports low/manageable stress levels. He was not able to identify any particular coping skills today.      Keon does not identify elizabeth/spirituality as important in his life and goes to Shinto \"off and on\".    Chemical Health:  Keon denies the use of alcohol, tobacco or other drugs.     Leisure Activities/Interests:   Keon enjoys going fishing, ice fishing, hanging out with friends/Miri, snowmobiling, and riding on his side by side.     Intervention:  -Psychosocial Assessment  -Supportive counseling    Assessment:  Keon was friendly and appeared to be open in his responses. He reports no major changes in the past year. He continues to work on the farm and will be a  this winter again. He denies any mental health concerns. He reports his health is stable. He has adequate social support. He and his girlfriend hope to build a house in the next year or so. No insurance/financial concerns.     Keon seems to be psychosocially stable overall, with access to relevant resources and supports.  No concerns expressed/noted.    Plan:  Re-consult for any psychosocial needs that may arise.    Complete psychosocial assessment annually.  Continue to follow for regular clinic consult.    Lakisha Mayers [facility]  Adult Cystic Fibrosis   Ph: [phone], Pager: [phone]                  "

## 2023-08-07 NOTE — NURSING NOTE
Chief Complaint   Patient presents with    Blood Draw     Labs drawn via piv placed by RN in lab. VS Taken.      Labs drawn from PIV placed by RN. Line flushed with saline. Vitals taken. Pt checked in for appointment(s).    Kanchan Brennan RN

## 2023-08-07 NOTE — PROGRESS NOTES
Received a call back from lab at 0953 hr for critical value of Glucose (45). Notified his nurse, Emily.

## 2023-08-07 NOTE — PATIENT INSTRUCTIONS
Cystic Fibrosis Self-Care Plan       Patient: Keon Conway   MRN: [id number]   Clinic Date: August 7, 2023     RECOMMENDATIONS:  1. Okay to decrease vesting to as needed for symptoms.   2. Consider frequent smaller meals/snacks to keep your blood sugars stable. Let us know if you start to have symptoms related to low blood sugars.     Annual Studies:   IGG   Date Value Ref Range Status   12/07/2020 979 610 - 1,616 mg/dL Final     Immunoglobulin G   Date Value Ref Range Status   12/07/2021 999 610-1,616 mg/dL Final     Insulin   Date Value Ref Range Status   12/07/2021 13.8 3.0 - 25.0 mU/L Final   05/08/2019 5.7 3 - 25 mU/L Final     There are no preventive care reminders to display for this patient.    Pulmonary Function Tests  FEV1: amount of air you can blow out in 1 second  FVC: total amount of air you can take in and blow out    Your Goals:             Latest Ref Rng & Units 8/7/2023     9:57 AM   PFT   FVC L 5.07  P   FEV1 L 4.28  P   FVC% % 106  P   FEV1% % 105  P      P Preliminary result          Airway Clearance: The Most Important Way to Keep Your Lungs Healthy  Vest Settings:   Hill-Rom Frequencies: 8, 9, 10 Pressure 10 Then, Frequencies 18, 19, 20 Pressure 6     RespirTech: Quick Start with Pressure of     Do each frequency for 5 minutes; Deflate vest after each frequency & cough 3 times before beginning the next setting.    Vest and Neb Therapy should be done 1 times/day.    Good Nutrition Can Improve Lung Function and Overall Health    Take ALL of your vitamins with food    Take 1/2 of your enzymes before EVERY meal/snack and the other 1/2 mid-meal/snack    Wt Readings from Last 3 Encounters:   08/07/23 73.4 kg (161 lb 13.1 oz)   08/07/23 73.4 kg (161 lb 12.8 oz)   04/03/23 74.8 kg (164 lb 14.5 oz)       Body mass index is 24.81 kg/m .         National CF Foundation Recommendations for BMI in CF Adults: Women: at least 22 Men: at least 23        Controlling Blood Sugars Helps Prevent Lung  Infections & Improves Nutrition  Test blood sugar:    In the morning before eating (goal is )    2 hours after a meal (goal is less than 150)    When pre-meal glucose is greater than 150 add correction    At bedtime (if less than 100 eat a snack with 15 grams of carbohydrates  Last A1C Results:   Hemoglobin A1C   Date Value Ref Range Status   12/07/2021 5.2 0.0 - 5.6 % Final     Comment:     Normal <5.7%   Prediabetes 5.7-6.4%    Diabetes 6.5% or higher     Note: Adopted from ADA consensus guidelines.   12/07/2020 5.1 0 - 5.6 % Final     Comment:     Normal <5.7% Prediabetes 5.7-6.4%  Diabetes 6.5% or higher - adopted from ADA   consensus guidelines.           If diabetic, measure A1C every 6 months. Goal is under 7%.    Staying Healthy  Research: If you are interested in learning about research opportunities or have questions, please contact Lily Daniels at [phone] or [email or web address].     Foundation: Compass is a personalized resource service to help you with the insurance, financial, legal and other issues you are facing.  It's free, confidential and available to anyone with CF.  Ask your  for more information or contact Compass directly at [facility] ([phone]) or compass@cff.org, or learn more at cff.org/compass.       CF Nurse Line: Rodney Ken: [phone]  Marcelina Sales, RT: [phone]    Kaye Stark and Willow Mendes , Dieticians: [phone]    Mindy Pedro, Diabetes Nurse: [phone]   Felicia Garcia: [phone] or Lakisha Lay at [phone], Social Workers  [email or web address]

## 2023-08-07 NOTE — NURSING NOTE
"Keon Conway is a 23 year old year old who is being seen for Follow Up (cf)      Medications reviewed and Vital signs taken.    Specimen Collection Type: Throat Swab    Order(s) placed: CF Aerobic Bacterial    *IF AFB order placed - please enter \"PRIORITIZE AFB\" to order comments.       No results found for: ACIDFAST      Lab Results   Component Value Date    AFBSMS Negative for acid fast bacteria 07/29/2019    AFBSMS  07/29/2019     A minimum of 5 mL of sputum or fluid is recommended for recovery of acid fast bacilli   (AFB).  Volumes less than 5 mL are suboptimal and may compromise recovery of AFB from   culture.      AFBSMS  07/29/2019     Assayed at [facility] [address] [place] [address] [phone]           "

## 2023-08-07 NOTE — PROGRESS NOTES
Keon Conway [facility] for a glucose tolerance test.  During today's [facility] appointment orders from Nita Cedeño PA-C were completed.    Patient NPO: YES  CF annual labs completed YES  Patient drank 75 grams glucola at 0705 within 10 minutes.    Administrations This Visit       glucose tolerence test (GLUCOLA) 25% oral liquid 75 g       Admin Date  08/07/2023 Action  $Given Dose  75 g Route  Oral Administered By  Emily Mena, YUE                    Labs drawn at baseline, +30, +60, +90 and +120 minutes post glucola.    Pt tolerated glucose tolerance test poorly, post-test blood sugar low and patient light-headed    Post test blood sugar 41, 39, 73, 101. Pt asymptomatic upon final blood sugar    Patient given snack YES & sent with snacks    Patient discharged at 0940 with significant other to imaging.    Emily Mena, RN    /76 (BP Location: Right arm, Patient Position: Sitting, Cuff Size: Adult Regular)   Pulse 87   Temp 97.9  F (36.6  C) (Oral)   Resp 18   Wt 73.4 kg (161 lb 12.8 oz)   SpO2 99%   BMI 24.81 kg/m

## 2023-08-07 NOTE — LETTER
8/7/2023         RE: Keon Conway  [address]  [facility] [address]        Dear Colleague,    Thank you for referring your patient, Keon Conway, to the [facility] [place]. Please see a copy of my visit note below.    [facility]  August 7, 2023         Assessment and Plan:   Keon Conway is a 23 year old male with cystic fibrosis who is seen today in clinic for routine follow up.     1. CF lung disease: no new pulmonary complaints, has been consistent with his Trikafta use. 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 Sating 99% on room air; vesting 1 time per day. PFTs stable at his baseline best. Previous cultures have grown out MSSA, H. Influenzae and Ps A. No evidence of exacerbation.    - Okay to change nebs and vesting to PRN  - Chronic azithromycin 500 mg three times/week     2. CFTR modulation: on Trikafta and tolerating it well. Labs today WNL.   - Continue Trikafa     3. Reactive hypoglycemia:   Fasting hyperglycemia:  Abnormal OGTT: AIC pending for today.  Fasting BS of 110, peaked at 230 and down to 47, symptomatic. Does have a glucometer at home, but does not check BS. BHARATH Restrepo, to see today.  - OGTT with next f/u    4. Exocrine pancreatic insufficiency: no s/s of malabsorption. Vitamin D level low in April. BMI > CFF goal.   - Continue pancreatic enzymes and vitamin supplementation     5. Hypertriglyceridemia: elevated since 2005,  stable.     6. HCM: reviewed with the patient today including normal CBC, BMP completed 4/3 WNL, hepatic panel today is normal, CK WNL.  CXR reviewed and demonstrates stable changes of transplant. Multiple labs pending    RTC: 4 months  Annual: August 2024; DEXA due 12/2023  Vaccinations: declines covid vaccine    Nita Cedeño PA-C  Pulmonary, Allergy, Critical Care and Sleep Medicine        Interval History:     No  recent illnesses, no cough or shortness of breath. Continues to vest once/day, doesn't notice much of a difference. No bloating or gas, stools sink.          Review of Systems:   Please see HPI. Otherwise, complete 10 point ROS negative.           Past Medical and Surgical History:     Past Medical History:   Diagnosis Date     CF (cystic fibrosis) (H) 11/30/2011     Chronic maxillary sinusitis 11/30/2011     Exocrine pancreatic insufficiency 11/30/2011     Past Surgical History:   Procedure Laterality Date     BRONCHOSCOPY (RIGID OR FLEXIBLE), DIAGNOSTIC N/A 10/2/2017    Procedure: COMBINED BRONCHOSCOPY (RIGID OR FLEXIBLE), LAVAGE;  Flexible Bronchoscopy With Lavage, PICC Line Placement ;  Surgeon: Darrel Dudley MD;  Location: [facility] OR     CATARACT IOL, RT/LT Left      EXAM UNDER ANESTHESIA EYE(S) Left 3/17/2015    Procedure: EXAM UNDER ANESTHESIA EYE(S);  Surgeon: Aamir Taylor MD;  Location: [facility] OR     HERNIA REPAIR  December 2014     INSERT PICC LINE Right 10/2/2017    Procedure: INSERT PICC LINE;;  Surgeon: Angeles Singh MD;  Location: [facility] OR     LAPAROSCOPIC ASSISTED INSERTION TUBE GASTROTOMY N/A 10/16/2017    Procedure: LAPAROSCOPIC ASSISTED INSERTION TUBE GASTROSTOMY;  Laparoscopic Gastrostomy Tube Placement.;  Surgeon: Jeremy Obando MD;  Location: [facility] OR     SCRUB ETHYLENEDIAMENETETRAACETIC (EDTA) Left 3/17/2015    Procedure: SCRUB ETHYLENEDIAMENETETRAACETIC (EDTA);  Surgeon: Aamir Taylor MD;  Location: [facility] OR     SINUS SURGERY  3/2011           Family History:     Family History   Problem Relation Age of Onset     Diabetes Paternal Grandfather             Social History:     Social History     Socioeconomic History     Marital status: Single     Spouse name: Not on file     Number of children: Not on file     Years of education: Not on file     Highest education level: Not on file   Occupational History     Not on file   Tobacco Use     Smoking status: Never     Smokeless tobacco:  Never   Substance and Sexual Activity     Alcohol use: No     Drug use: No     Sexual activity: Not on file   Other Topics Concern     Parent/sibling w/ CABG, MI or angioplasty before 65F 55M? Not Asked   Social History Narrative    Mom is 35, Dad 39, both healthy.     Social Determinants of Health     Financial Resource Strain: Not on file   Food Insecurity: Not on file   Transportation Needs: Not on file   Physical Activity: Not on file   Stress: Not on file   Social Connections: Not on file   Intimate Partner Violence: Not on file   Housing Stability: Not on file            Medications:     Current Outpatient Medications   Medication     elexacaftor-tezacaftor-ivacaftor & ivacaftor (TRIKAFTA) 100-50-75 & 150 MG tablet pack     albuterol (VENTOLIN HFA) 108 (90 Base) MCG/ACT inhaler     amylase-lipase-protease (CREON) 47057-22202 units CPEP per EC capsule     azithromycin (ZITHROMAX) 500 MG tablet     blood glucose (ACCU-CHEK GUIDE) test strip     blood glucose monitoring (ACCU-CHEK FASTCLIX) lancets     dornase gloria (PULMOZYME) 2.5 MG/2.5ML neb solution     ipratropium - albuterol 0.5 mg/2.5 mg/3 mL (DUONEB) 0.5-2.5 (3) MG/3ML neb solution     mvw complete formulation (SOFTGELS ) capsule     No current facility-administered medications for this visit.            Physical Exam:   /76   Pulse 87   Temp 97.9  F (36.6  C)   Wt 73.4 kg (161 lb 13.1 oz)   SpO2 99%   BMI 24.81 kg/m      GENERAL: alert, NAD  HEENT: NCAT, EOMI, anicteric sclera, no oral mucosal edema or erythema  Neck: no cervical or supraclavicular adenopathy  Respiratory: good air flow, mainly clear  CV: RRR, S1S2, no murmurs noted  Abdomen: normoactive BS, soft   Lymph: no edema  Neuro: AAO X 3, CN 2-12 grossly intact  Psychiatric: normal affect, good eye contact  Skin: no rash, jaundice or lesions on limited exam         Data:   All laboratory and imaging data reviewed.      Cystic Fibrosis Culture  Specimen Description   Date Value Ref  Range Status   2021 Throat  Final   2020 Throat  Final   2020 Throat  Final    Culture Micro   Date Value Ref Range Status   2021 Moderate growth  Normal branden    Final   2020 Moderate growth  Normal branden    Final   2020 Canceled, Test credited  Incorrect specimen type    Final   2020   Final    Notification of test cancellation was given to  Mindy in [facility]. They do not want a yeast culture instead. 2.3.20 at 1109 bw          PFT interpretation:  Maneuver: valid and meets ATS guidelines  Normal spirometry      CF Annual Nutrition Assessment    Reason for Assessment  Assessed during clinic visit with Nita Cedeño r/t increased nutrition risk with diagnosis of CF per protocol.    Nutrition Significant PMH  Mild Lung Disease - taking Trikafta  Pancreatic Insufficient  G-tube placed  -- fell out 2021 and maintaining weight  Reactive hypoglycemia with OGTT (since )    Anthropometric Assessment  Height: 172 cm  IBW based on BMI 22 for females and 23 for males per CF Foundation recs: 68.6 kg (151 lbs)  Today's Weight: 73.4 kg (actual weight) (156 lbs)  Body mass index is 24.81 kg/m .     Wt Readings from Last 5 Encounters:   23 73.4 kg (161 lb 13.1 oz)   23 73.4 kg (161 lb 12.8 oz)   23 74.8 kg (164 lb 14.5 oz)   22 70.9 kg (156 lb 4.9 oz)   22 69.4 kg (153 lb)     Comments: Weight remained stable over the last year and at CFF goal.     Pancreatic Enzymes  Brand:  Creon [id number]  Dosin with meals, 3 with snacks = 1962 units lipase/kg/meal  Estimated Daily Intake: 20-24 caps = 7847 units lipase/kg/day    Signs of Malabsorption:  No  Enzyme Program:  None    Diet History and Assessment  Diet Preferences/Allergies/Intolerances: High Kcal/Pro    Intake Recall/Comments: Fair/good appetite at baseline, reports typical intake BID meals + 1-2 snacks daily. Skips breakfast but may have snacks or granola bars on the way to work with Trikafta.  Has been able to maintain weight without TFs. Overall consumes good variety including fruits, vegetables, and dairy foods/protein.     Diet Recall:  Breakfast- skips, may have snacks like granola bars  Lunch- pack leftovers to eat at work or 3-4 ham sandwiches + 2 cups fruit + granola bar  Dinner- Mom cooks, usually meat/potatoes  Snacks- PB/cheese crackers, granola bars, yogurt, peanuts    Calcium: adequate with 3+ cups of whole milk and cheese/yogurt  Salt: adds to foods + Gatorade zero  Hydration: adequate, drinks lots of water/milk, occasionally sports drinks like Gatorade.   Supplements: No  Tube Feeding: No - GT fell out in March 2021. Has been able to maintain weight without TFs.  - Previous regimen: Nutren 2.0 @ 80 mL/hr x 6 hrs to provide 500 mls, 1000 kcals, 42 g pro.     Estimated Energy and Protein Needs  Estimation based on weight maintenance/gain with Mild lung disease and pancreatic insufficient.    BEE: 1700   7909-5813 kcals/day = 150-175% BEE   g protein/day = 1.2-1.5 g/kg    Laboratory Assessment  Annual study labs obtained April 2023  Vitamin A- 0.7 wnl  Vitamin D- 18 Low  Vitamin E- ---  Iron- ---  Lipid Panel- TG elevated although improving; will continue to monitor with AS    OGTT- (8/7/23) Impaired glucose tolerance, peak wt 232 with reactive hypoglycemia at the end (47).  DEXA- 2021, lowest z-score 0.8    Current Vitamin/Mineral Prescription: Vitamin D 5000 units    CF Related Diabetes Evaluation  Keon asymptomatic with hypoglycemia noted on OGTT. Educated by  RD on 5/8/19 for diet recs to prevent reactive hypoglycemia.   -- Seen by endocrine/Dr. Castro and CDE 2019. Given glucose meter and recommended to complete BG checks if symptomatic. Complete OGTT annually.  -- Educated again by  RD 12/2021 regarding hypoglycemia. Pt reports he has plans to follow-up with endo.     Nutrition Diagnosis  Impaired nutrient utilization related to pancreatic insufficiency as evidenced by  requires pancreatic enzyme replacement therapy, high kcal/pro diet, and vitamin/mineral supplementation in order to maintain BMI >23 kg/m2 and support overall health.     Interventions/Recommendations  1) Oral Intake/Weight: Discussed current nutrition status including review of weight trends and adequacy of total calorie intake. Continue to encourage good PO with balanced intake for weight maintenance.  Discussed intake strategy for minimizing hypoglycemia and blood sugar spikes (mixed macronutrients) and importance of checking for/treating low blood sugar.    2) Enzymes: No GI concerns noted today and will continue with current enzyme regimen.    3) Vitamins:  Encouraged increase adherence with vitamin D, taking with food and enzymes.       GOALS:  1) Weight maintenance BMI >23 kg/m2  2) Take vitamins/enzymes as prescribed    FOLLOW-UP/MONITORING:  Visit patient within 12 month(s) for annual nutrition assessment.     Time Spent In Face-to-Face Patient Interactions: 15 minutes      Kaye Stark MS, RD, LD (pager [phone])  Cystic Fibrosis/Lung Transplant Dietitian      -Available Mon-Thurs 8 AM-4:30 PM. On Fridays please contact pager [phone] (Willow Mendes RD) and on weekends/holidays contact coverage dietitian at pager [phone] (inpatient use only).            Again, thank you for allowing me to participate in the care of your patient.        Sincerely,        Nita Cedeño PA-C

## 2023-08-07 NOTE — PROGRESS NOTES
CF Annual Nutrition Assessment    Reason for Assessment  Assessed during clinic visit with Nita Cedeño r/t increased nutrition risk with diagnosis of CF per protocol.    Nutrition Significant PMH  Mild Lung Disease - taking Trikafta  Pancreatic Insufficient  G-tube placed  -- fell out 2021 and maintaining weight  Reactive hypoglycemia with OGTT (since )    Anthropometric Assessment  Height: 172 cm  IBW based on BMI 22 for females and 23 for males per CF Foundation recs: 68.6 kg (151 lbs)  Today's Weight: 73.4 kg (actual weight) (156 lbs)  Body mass index is 24.81 kg/m .     Wt Readings from Last 5 Encounters:   23 73.4 kg (161 lb 13.1 oz)   23 73.4 kg (161 lb 12.8 oz)   23 74.8 kg (164 lb 14.5 oz)   22 70.9 kg (156 lb 4.9 oz)   22 69.4 kg (153 lb)     Comments: Weight remained stable over the last year and at CFF goal.     Pancreatic Enzymes  Brand:  Creon [id number]  Dosin with meals, 3 with snacks = 1962 units lipase/kg/meal  Estimated Daily Intake: 20-24 caps = 7847 units lipase/kg/day    Signs of Malabsorption:  No  Enzyme Program:  None    Diet History and Assessment  Diet Preferences/Allergies/Intolerances: High Kcal/Pro    Intake Recall/Comments: Fair/good appetite at baseline, reports typical intake BID meals + 1-2 snacks daily. Skips breakfast but may have snacks or granola bars on the way to work with Trikafta. Has been able to maintain weight without TFs. Overall consumes good variety including fruits, vegetables, and dairy foods/protein.     Diet Recall:  Breakfast- skips, may have snacks like granola bars  Lunch- pack leftovers to eat at work or 3-4 ham sandwiches + 2 cups fruit + granola bar  Dinner- Mom cooks, usually meat/potatoes  Snacks- PB/cheese crackers, granola bars, yogurt, peanuts    Calcium: adequate with 3+ cups of whole milk and cheese/yogurt  Salt: adds to foods + Gatorade zero  Hydration: adequate, drinks lots of water/milk, occasionally sports  drinks like Gatorade.   Supplements: No  Tube Feeding: No - GT fell out in March 2021. Has been able to maintain weight without TFs.  - Previous regimen: Nutren 2.0 @ 80 mL/hr x 6 hrs to provide 500 mls, 1000 kcals, 42 g pro.     Estimated Energy and Protein Needs  Estimation based on weight maintenance/gain with Mild lung disease and pancreatic insufficient.    BEE: 1700   7306-3815 kcals/day = 150-175% BEE   g protein/day = 1.2-1.5 g/kg    Laboratory Assessment  Annual study labs obtained April 2023  Vitamin A- 0.7 wnl  Vitamin D- 18 Low  Vitamin E- ---  Iron- ---  Lipid Panel- TG elevated although improving; will continue to monitor with AS    OGTT- (8/7/23) Impaired glucose tolerance, peak wt 232 with reactive hypoglycemia at the end (47).  DEXA- 2021, lowest z-score 0.8    Current Vitamin/Mineral Prescription: Vitamin D 5000 units    CF Related Diabetes Evaluation  Keon asymptomatic with hypoglycemia noted on OGTT. Educated by CF RD on 5/8/19 for diet recs to prevent reactive hypoglycemia.   -- Seen by endocrine/Dr. Castro and CDE 2019. Given glucose meter and recommended to complete BG checks if symptomatic. Complete OGTT annually.  -- Educated again by CF RD 12/2021 regarding hypoglycemia. Pt reports he has plans to follow-up with endo.     Nutrition Diagnosis  Impaired nutrient utilization related to pancreatic insufficiency as evidenced by requires pancreatic enzyme replacement therapy, high kcal/pro diet, and vitamin/mineral supplementation in order to maintain BMI >23 kg/m2 and support overall health.     Interventions/Recommendations  1) Oral Intake/Weight: Discussed current nutrition status including review of weight trends and adequacy of total calorie intake. Continue to encourage good PO with balanced intake for weight maintenance.  Discussed intake strategy for minimizing hypoglycemia and blood sugar spikes (mixed macronutrients) and importance of checking for/treating low blood sugar.    2)  Enzymes: No GI concerns noted today and will continue with current enzyme regimen.    3) Vitamins:  Encouraged increase adherence with vitamin D, taking with food and enzymes.       GOALS:  1) Weight maintenance BMI >23 kg/m2  2) Take vitamins/enzymes as prescribed    FOLLOW-UP/MONITORING:  Visit patient within 12 month(s) for annual nutrition assessment.     Time Spent In Face-to-Face Patient Interactions: 15 minutes      Kaye Stark MS, RD, LD (pager [phone])  Cystic Fibrosis/Lung Transplant Dietitian      -Available Mon-Thurs 8 AM-4:30 PM. On Fridays please contact pager [phone] (Willow Mendes RD) and on weekends/holidays contact coverage dietitian at pager [phone] (inpatient use only).

## 2023-08-10 LAB
A-TOCOPHEROL VIT E SERPL-MCNC: 15.9 MG/L
BETA+GAMMA TOCOPHEROL SERPL-MCNC: 0.3 MG/L

## 2023-08-12 LAB — BACTERIA SPEC CULT: NORMAL

## 2023-09-25 DIAGNOSIS — E84.9 CF (CYSTIC FIBROSIS) (H): ICD-10-CM

## 2023-09-25 RX ORDER — AZITHROMYCIN 500 MG/1
500 TABLET, FILM COATED ORAL
Qty: 12 TABLET | Refills: 11 | Status: SHIPPED | OUTPATIENT
Start: 2023-09-25

## 2023-11-03 ENCOUNTER — PHARMACY VISIT (OUTPATIENT)
Dept: ADMINISTRATIVE | Facility: CLINIC | Age: 23
End: 2023-11-03
Payer: COMMERCIAL

## 2023-11-03 NOTE — PROGRESS NOTES
Cystic Fibrosis Clinical Follow Up Assessment   Completed on 2023/11/03 19:25:16 [facility], by Anna Lazcano        Activity Date 2023/11/03     Activity Medications    TRIKAFTA        Care Details    What are the patient's goals for this therapy?   ? 11/3/2023: no specific goals; 3/30/2022: Mom said pt is feeling better      Did you identify any patient barriers to access and successful treatment?   ? No barriers to access identified      Is it appropriate to collect a PDC at this time? [QA Metric] (An MPR or PDC would not be appropriate for cycled medications or if the patient is on therapy   ? Yes      Document PDC   ? 0.98      Has the patient missed doses inappropriately?   ? No      Please select CURRENT side effect(s) for monitoring:   ? None          Summary Notes  I had the pleasure of speaking to Mom for TM.   Side effects: None. Doses: States he does good remembering his doses. He uses a pill box and it works well for him. No health, allergy or medication changes. No questions or concerns.   - Will continue biannual TM.       Pearl LAZCANO, PharmD, CSP  Therapy Management Pharmacist  [address] [address]   [email or web address]  [email or web address]   Specialty: [phone]  Mail Order: [phone]

## 2024-01-16 DIAGNOSIS — E84.9 CF (CYSTIC FIBROSIS) (H): ICD-10-CM

## 2024-02-02 DIAGNOSIS — E84.9 CYSTIC FIBROSIS (H): Primary | ICD-10-CM

## 2024-02-06 ENCOUNTER — TELEPHONE (OUTPATIENT)
Dept: CARE COORDINATION | Facility: CLINIC | Age: 24
End: 2024-02-06
Payer: COMMERCIAL

## 2024-02-06 NOTE — TELEPHONE ENCOUNTER
Adult Cystic Fibrosis Program  Social Work Phone/E-mail Communication    Data:   Keon's mom reached out to  to discuss insurance options. Keon has given permission to speak with his mom regarding insurance. His mom states that he is now over income for MA and will be losing coverage. She wasn't sure how to proceed. Keon is under 26 so he would likely qualify to go back on his mom's insurance which he was previously covered by. Also discussed marketplace plans but that these would be more costly. Provided brief overview of programs such as health well, and co-pay assistance. His mom plans to look into the options and will reach out to  with questions.    Intervention:   -Insurance/financial counseling    Assessment: Keon is now over income for MA. He likely has access to get on his mom's plan which he has been on before and would be the most cost effective option. His mom was relieved to hear about assistance programs for costs. She is aware of how to get a hold of SW if additional questions arise.    Plan:   Continue to assist with insurance concern(s)  Continue to follow through regular clinic consult and annual visit    DIMITRI Katz  Adult Cystic Fibrosis   Ph: [phone], Pager: [phone]

## 2024-02-12 ENCOUNTER — TELEPHONE (OUTPATIENT)
Dept: PULMONOLOGY | Facility: CLINIC | Age: 24
End: 2024-02-12
Payer: COMMERCIAL

## 2024-02-12 NOTE — TELEPHONE ENCOUNTER
PA Initiation    Medication: PULMOZYME 2.5 MG/2.5ML IN SOLN  Insurance Company: Other (see comments)Comment:  [facility]  Pharmacy Filling the Rx:    Filling Pharmacy Phone:    Filling Pharmacy Fax:    Start Date: 2/12/2024    Key: [id number] - need to submit PA closer to PA expiration date, once have determination can offer [facility] and if would like filled there then email liaison leadership for override.

## 2024-02-12 NOTE — PROGRESS NOTES
[facility]  February 13, 2024         Assessment and Plan:   Keon Conway is a 24 year old male with cystic fibrosis who is seen today in clinic for routine follow up. He was last seen in August 2023.     1. Moderate exacerbation of CF lung disease: with symptoms since last Thursday, slowly improving. Sating 98% on room air; vesting 1-2 times/day. PFTs today down > 500 ml. Previous cultures have grown out MSSA, H. Influenzae and Ps A. Symptoms, exam and PFTs all consistent with exacerbation.   - Start levaquin and noemi nebs X 3 weeks  - Resume nebs and vesting BID  - Chronic azithromycin 500 mg three times/week--hold  - Did place Allergy referral     2. CFTR modulation: on Trikafta and tolerating it well. Labs today WNL.   - Continue Trikafa     3. Reactive hypoglycemia:   Fasting hyperglycemia:  Abnormal OGTT: AIC of 5.2 Fasting BS of 110, peaked at 230 and down to 47, symptomatic. Does have a glucometer at home, did not address today.   - Check BS periodically  - OGTT annually    4. Exocrine pancreatic insufficiency: no s/s of malabsorption. BMI > CFF goal.   - Continue pancreatic enzymes and vitamin supplementation     5. Hypertriglyceridemia: elevated since 2005,  stable.     RTC: 3 weeks  Annual: August 2024; DEXA due 12/2023 (overdue, ordered)  Vaccinations: declines covid vaccine    Nita Cedeño PA-C  Pulmonary, Allergy, Critical Care and Sleep Medicine        Interval History:     Has been sick since last Thursday, had the chills, now resolved and a sore throat and cough. No fever,  or sinus or nasal congestion. Cough is more frequent and more productive, no blood. Notes some congestion, but no tightness or shortness of breath with what he's done. Normally vesting 1 time/day, has vested 2 times/day the last few days. No chest pain or palpitations. No nausea or diarrhea, stools are normal and once/day.              Review of Systems:   Please  see HPI. Otherwise, complete 10 point ROS negative.           Past Medical and Surgical History:     Past Medical History:   Diagnosis Date    CF (cystic fibrosis) (H) 11/30/2011    Chronic maxillary sinusitis 11/30/2011    Exocrine pancreatic insufficiency 11/30/2011     Past Surgical History:   Procedure Laterality Date    BRONCHOSCOPY (RIGID OR FLEXIBLE), DIAGNOSTIC N/A 10/2/2017    Procedure: COMBINED BRONCHOSCOPY (RIGID OR FLEXIBLE), LAVAGE;  Flexible Bronchoscopy With Lavage, PICC Line Placement ;  Surgeon: Darrel Dudley MD;  Location: [facility] OR    CATARACT IOL, RT/LT Left     EXAM UNDER ANESTHESIA EYE(S) Left 3/17/2015    Procedure: EXAM UNDER ANESTHESIA EYE(S);  Surgeon: Aamir Taylor MD;  Location: [facility] OR    HERNIA REPAIR  December 2014    INSERT PICC LINE Right 10/2/2017    Procedure: INSERT PICC LINE;;  Surgeon: Angeles Singh MD;  Location: [facility] OR    LAPAROSCOPIC ASSISTED INSERTION TUBE GASTROTOMY N/A 10/16/2017    Procedure: LAPAROSCOPIC ASSISTED INSERTION TUBE GASTROSTOMY;  Laparoscopic Gastrostomy Tube Placement.;  Surgeon: Jeremy Obando MD;  Location: [facility] OR    SCRUB ETHYLENEDIAMENETETRAACETIC (EDTA) Left 3/17/2015    Procedure: SCRUB ETHYLENEDIAMENETETRAACETIC (EDTA);  Surgeon: Aamir Taylor MD;  Location: [facility] OR    SINUS SURGERY  3/2011           Family History:     Family History   Problem Relation Age of Onset    Diabetes Paternal Grandfather             Social History:     Social History     Socioeconomic History    Marital status: Single     Spouse name: Not on file    Number of children: Not on file    Years of education: Not on file    Highest education level: Not on file   Occupational History    Not on file   Tobacco Use    Smoking status: Never    Smokeless tobacco: Never   Substance and Sexual Activity    Alcohol use: No    Drug use: No    Sexual activity: Not on file   Other Topics Concern    Parent/sibling w/ CABG, MI or angioplasty before 65F 55M? Not Asked   Social  "History Narrative    Mom is 35, Dad 39, both healthy.     Social Determinants of Health     Financial Resource Strain: Not on file   Food Insecurity: Not on file   Transportation Needs: Not on file   Physical Activity: Not on file   Stress: Not on file   Social Connections: Not on file   Interpersonal Safety: Not on file   Housing Stability: Not on file            Medications:     Current Outpatient Medications   Medication    albuterol (VENTOLIN HFA) 108 (90 Base) MCG/ACT inhaler    azithromycin (ZITHROMAX) 500 MG tablet    blood glucose (ACCU-CHEK GUIDE) test strip    blood glucose monitoring (ACCU-CHEK FASTCLIX) lancets    dornase gloria (PULMOZYME) 2.5 MG/2.5ML neb solution    elexacaftor-tezacaftor-ivacaftor & ivacaftor (TRIKAFTA) 100-50-75 & 150 MG tablet pack    ipratropium - albuterol 0.5 mg/2.5 mg/3 mL (DUONEB) 0.5-2.5 (3) MG/3ML neb solution    levofloxacin (LEVAQUIN) 750 MG tablet    lipase-protease-amylase (CREON) [id number] units CPEP per EC capsule    mvw complete formulation (SOFTGELS ) capsule    tobramycin, PF, (NAMRATA) 300 MG/5ML neb solution     No current facility-administered medications for this visit.            Physical Exam:   /77   Pulse 112   Ht 1.72 m (5' 7.72\")   Wt 75.8 kg (167 lb)   SpO2 98%   BMI 25.60 kg/m      GENERAL: alert, NAD  HEENT: NCAT, EOMI, anicteric sclera, no oral mucosal edema or erythema  Neck: no cervical or supraclavicular adenopathy  Respiratory: moderate airflow, scattered crackles  CV: RRR, S1S2, no murmurs noted  Abdomen: normoactive BS, soft   Lymph: no edema  Neuro: AAO X 3, CN 2-12 grossly intact  Psychiatric: normal affect, good eye contact  Skin: no rash, jaundice or lesions on limited exam         Data:   All laboratory and imaging data reviewed.      Cystic Fibrosis Culture  Specimen Description   Date Value Ref Range Status   04/05/2021 Throat  Final   12/07/2020 Throat  Final   02/03/2020 Throat  Final    Culture Micro   Date Value Ref " Range Status   04/05/2021 Moderate growth  Normal branden    Final   12/07/2020 Moderate growth  Normal branden    Final   02/03/2020 Canceled, Test credited  Incorrect specimen type    Final   02/03/2020   Final    Notification of test cancellation was given to  Mindy in [facility]. They do not want a yeast culture instead. 2.3.20 at 1109 bw          PFT interpretation:  Maneuver: valid and meets ATS guidelines  Normal spirometry  Compared to prior: FEV1 fo 3.75 is 530 ml below prior

## 2024-02-13 ENCOUNTER — TELEPHONE (OUTPATIENT)
Dept: PULMONOLOGY | Facility: CLINIC | Age: 24
End: 2024-02-13

## 2024-02-13 ENCOUNTER — OFFICE VISIT (OUTPATIENT)
Dept: PHARMACY | Facility: CLINIC | Age: 24
End: 2024-02-13
Payer: COMMERCIAL

## 2024-02-13 ENCOUNTER — LAB (OUTPATIENT)
Dept: LAB | Facility: CLINIC | Age: 24
End: 2024-02-13
Payer: COMMERCIAL

## 2024-02-13 ENCOUNTER — OFFICE VISIT (OUTPATIENT)
Dept: PULMONOLOGY | Facility: CLINIC | Age: 24
End: 2024-02-13
Attending: PHYSICIAN ASSISTANT
Payer: COMMERCIAL

## 2024-02-13 VITALS
SYSTOLIC BLOOD PRESSURE: 122 MMHG | OXYGEN SATURATION: 98 % | WEIGHT: 167 LBS | DIASTOLIC BLOOD PRESSURE: 77 MMHG | BODY MASS INDEX: 25.31 KG/M2 | HEART RATE: 112 BPM | HEIGHT: 68 IN

## 2024-02-13 DIAGNOSIS — K86.81 EXOCRINE PANCREATIC INSUFFICIENCY: ICD-10-CM

## 2024-02-13 DIAGNOSIS — E55.9 HYPOVITAMINOSIS D: ICD-10-CM

## 2024-02-13 DIAGNOSIS — E84.9 CYSTIC FIBROSIS (H): Primary | ICD-10-CM

## 2024-02-13 DIAGNOSIS — E84.9 CYSTIC FIBROSIS (H): ICD-10-CM

## 2024-02-13 DIAGNOSIS — E84.9 CF (CYSTIC FIBROSIS) (H): ICD-10-CM

## 2024-02-13 DIAGNOSIS — E84.9 CF (CYSTIC FIBROSIS) (H): Primary | ICD-10-CM

## 2024-02-13 DIAGNOSIS — E84.0 CYSTIC FIBROSIS WITH PULMONARY MANIFESTATIONS (H): Primary | ICD-10-CM

## 2024-02-13 LAB
ALBUMIN SERPL BCG-MCNC: 4.6 G/DL (ref 3.5–5.2)
ALP SERPL-CCNC: 92 U/L (ref 40–150)
ALT SERPL W P-5'-P-CCNC: 18 U/L (ref 0–70)
AST SERPL W P-5'-P-CCNC: 22 U/L (ref 0–45)
BILIRUB DIRECT SERPL-MCNC: <0.2 MG/DL (ref 0–0.3)
BILIRUB SERPL-MCNC: 0.4 MG/DL
CK SERPL-CCNC: 49 U/L (ref 39–308)
EXPTIME-PRE: 6.47 SEC
FEF2575-%PRED-PRE: 65 %
FEF2575-PRE: 2.91 L/SEC
FEF2575-PRED: 4.43 L/SEC
FEFMAX-%PRED-PRE: 95 %
FEFMAX-PRE: 9.33 L/SEC
FEFMAX-PRED: 9.73 L/SEC
FEV1-%PRED-PRE: 92 %
FEV1-PRE: 3.75 L
FEV1FEV6-PRE: 75 %
FEV1FEV6-PRED: 84 %
FEV1FVC-PRE: 75 %
FEV1FVC-PRED: 86 %
FIFMAX-PRE: 7.67 L/SEC
FVC-%PRED-PRE: 104 %
FVC-PRE: 4.98 L
FVC-PRED: 4.76 L
PROT SERPL-MCNC: 7.7 G/DL (ref 6.4–8.3)

## 2024-02-13 PROCEDURE — 99207 PR NO CHARGE LOS: CPT | Performed by: PHARMACIST

## 2024-02-13 PROCEDURE — G0463 HOSPITAL OUTPT CLINIC VISIT: HCPCS | Performed by: PHYSICIAN ASSISTANT

## 2024-02-13 PROCEDURE — 87070 CULTURE OTHR SPECIMN AEROBIC: CPT | Performed by: PHYSICIAN ASSISTANT

## 2024-02-13 PROCEDURE — 99214 OFFICE O/P EST MOD 30 MIN: CPT | Mod: 25 | Performed by: PHYSICIAN ASSISTANT

## 2024-02-13 PROCEDURE — 94375 RESPIRATORY FLOW VOLUME LOOP: CPT | Performed by: PHYSICIAN ASSISTANT

## 2024-02-13 PROCEDURE — 82550 ASSAY OF CK (CPK): CPT | Performed by: PATHOLOGY

## 2024-02-13 PROCEDURE — 80076 HEPATIC FUNCTION PANEL: CPT | Performed by: PATHOLOGY

## 2024-02-13 PROCEDURE — 36415 COLL VENOUS BLD VENIPUNCTURE: CPT | Performed by: PATHOLOGY

## 2024-02-13 PROCEDURE — 99213 OFFICE O/P EST LOW 20 MIN: CPT | Performed by: PHYSICIAN ASSISTANT

## 2024-02-13 RX ORDER — LEVOFLOXACIN 750 MG/1
750 TABLET, FILM COATED ORAL DAILY
Qty: 21 TABLET | Refills: 0 | Status: SHIPPED | OUTPATIENT
Start: 2024-02-13 | End: 2024-03-05

## 2024-02-13 RX ORDER — IPRATROPIUM BROMIDE AND ALBUTEROL SULFATE 2.5; .5 MG/3ML; MG/3ML
SOLUTION RESPIRATORY (INHALATION)
Qty: 360 ML | Refills: 3 | Status: SHIPPED | OUTPATIENT
Start: 2024-02-13 | End: 2024-09-04

## 2024-02-13 RX ORDER — TOBRAMYCIN INHALATION SOLUTION 300 MG/5ML
300 INHALANT RESPIRATORY (INHALATION)
Qty: 210 ML | Refills: 0 | OUTPATIENT
Start: 2024-02-13 | End: 2024-02-16 | Stop reason: ALTCHOICE

## 2024-02-13 RX ORDER — PEDIATRIC MULTIVIT 61/D3/VIT K 1500-800
1 CAPSULE ORAL DAILY
Qty: 60 CAPSULE | Refills: 11 | Status: SHIPPED | OUTPATIENT
Start: 2024-02-13 | End: 2024-08-27

## 2024-02-13 NOTE — TELEPHONE ENCOUNTER
PA Initiation    Medication: TOBRAMYCIN (NAMRATA) 300 MG/5ML IN NEBU  Insurance Company: [employer] - Phone [phone] Fax [phone]  Pharmacy Filling the Rx:    Filling Pharmacy Phone:    Filling Pharmacy Fax:    Start Date: 2/13/2024    Key: [id number]

## 2024-02-13 NOTE — PROGRESS NOTES
Respiratory Therapist Note:         Vest                Brand: Hill-Rom - traditional Hill Rom: Frequencies 8, 9, 10 at pressure 10 then frequencies 18, 19, 20 at pressure 6.                Cough Pause: Cough Pause; Yes                Vest Garment Size: Adult Small                Last Fitting Date: prn                Frequency of therapy: 7-14 times per week                Concerns:          Exercise (purposeful and aerobic for >20 minutes each session): NO.                Does this qualify as additional airway clearance: No         Alternative Airway Clearance: none         Nebulized Medications                Bronchodilators: Albuterol and Atrovent                Mucolytic: Pulmozyme                Antibiotics: NAMRATA (starting now for illness)                Additional Inhaled Medications: MDI (albuterol MDI)         Review Cleaning: Yes. Top rack of .         Education and Transition Information                Correct order of inhaled medications: Yes                Mechanism of Action of inhaled medications: Yes                Frequency of inhaled medications: Yes                Dosage of inhaled medications: Yes        Home Care:                Nebulizer Cups (Brand/Type): Nery LC and sidestream                Nebulizer Compressor                            Year Purchased: 2022                            [facility], Phone: [phone], Fax: [phone]                Nebulizer Supply Company:                            [facility], Phone: [phone], Fax: [phone]         Oxygen: none         Pulmonary Rehab: none         Plan of Care and Goals for next visit: Neb cups and compressor filters ordered from [facility] today.

## 2024-02-13 NOTE — PROGRESS NOTES
Disease State Management Encounter:                          Keon Conway is a 24 year old male coming in for a follow-up visit from 4/3/23. Today's visit is a co-visit with Nita Cedeño PA-C.     Reason for visit: Annual CF Medication Review.    Medication Access: [email or web address] Patient expresses no concern regarding cost of medications.   Medication Administration: Patient's mom helps with medications. Per patient, misses medication 0 times per week.     CF:   Inhaled medications:  Bronchodilator: Duoneb 0.5-2.5 mg daily and albuterol HFA inhaler (rarely)  Mucolytic: Pulmozyme 2.5 mg once daily (rarely) and sodium chloride 7% daily (rarely).   Oral medications:  Azithromycin: 500 mg three times weekly. No SE.  CFTR modulator: Trikafta (full dose) with high fat meals.   He denies CF medication concerns/side effects. Experiencing moderate CF exacerbation.  Genotype: [id number]  Cultures (last growth): throat cultures grow PSA (3/1/22)     Lab Results   Component Value Date    ALT 18 02/13/2024    AST 22 02/13/2024    BILITOTAL 0.4 02/13/2024    DBIL <0.20 02/13/2024    CKT 49 02/13/2024     Pancreatic Insufficiency/Nutrition:   Creon [id number] units - 6 capsules with meals and 3 capsules with snacks.   Bowel regimen: Miralax 17 g once daily as needed  Vitamins include: MVW complete  daily  Patient is not experiencing sign/symptoms of malabsorption.    Assessment/Plan:    1. Trikafta labs within normal limits, continue full dose Trikafta. Recheck hepatic panel and CK in 6 months.  2. Per visit with Nita Cedeño PA-C, start levofloxacin 750 mg daily for 21 days and  tobramycin nebs 300 mg twice daily for 21 days. Keon to separate levofloxacin from vitamins and hold azithromycin while on therapy. Keon [email or web address] or [facility]. Per discussion with Harriet Trinh, pharmacy liaison, insurance  requires him to fill tobramycin nebs with [facility] pharmacy. Given medication required to fill with specialty pharmacy, will pursue medication override to fill with [place] [facility], warm hand-off to Harriet Trinh.     Follow-up: [facility], sooner if needed    I spent 12 minutes with this patient today. All changes were made via verbal approval with Nita Cedeño PA-C. A copy of the visit note was provided to the patient's provider(s).    A summary of these recommendations was given to the patient (see AVS from today's appointment with Nita Cedeño PA-C).    Liss Croft, PharmD   Medication Therapy Management   Cystic Fibrosis Pharmacist     Medication Therapy Recommendations  Cystic fibrosis exacerbation (H)    Current Medication: tobramycin, PF, (NAMRATA) 300 MG/5ML neb solution   Rationale: Medication product not available - Adherence - Adherence   Recommendation: Referral to Service    Status: Resolved Med Access Issue

## 2024-02-13 NOTE — PATIENT INSTRUCTIONS
Cystic Fibrosis Self-Care Plan       Patient: Keon Conway   MRN: [id number]   Clinic Date: February 13, 2024     RECOMMENDATIONS:  1. Continue nebulizers and vest therapy. Recommend vesting 2 times/day for the next week, then once/day.  2. You need to do nebs twice/day for 3 weeks. Duoneb for sure (possibly Pulmozyme if you feel congested) and then noemi nebs.  3. Take levaquin 1 tablet per day 2 hours before or 2 hours after your multivitamin, magnesium and iron supplementation. Hold azithromycin while on this antibiotic.  4. Allergy will call you to schedule appointment.     Annual Studies:   IGG   Date Value Ref Range Status   12/07/2020 979 610 - 1,616 mg/dL Final     Immunoglobulin G   Date Value Ref Range Status   12/07/2021 999 610-1,616 mg/dL Final     Insulin   Date Value Ref Range Status   08/07/2023 26.5 (H) 2.6 - 24.9 uU/mL Final   12/07/2021 13.8 3.0 - 25.0 mU/L Final   05/08/2019 5.7 3 - 25 mU/L Final     There are no preventive care reminders to display for this patient.    Pulmonary Function Tests  FEV1: amount of air you can blow out in 1 second  FVC: total amount of air you can take in and blow out    Your Goals:             Latest Ref Rng & Units 2/13/2024     8:41 AM   PFT   FVC L 4.98  P   FEV1 L 3.75  P   FVC% % 104  P   FEV1% % 92  P      P Preliminary result          Airway Clearance: The Most Important Way to Keep Your Lungs Healthy  Vest Settings:   Hill-Rom Frequencies: 8, 9, 10 Pressure 10 Then, Frequencies 18, 19, 20 Pressure 6     RespirTech: Quick Start with Pressure of     Do each frequency for 5 minutes; Deflate vest after each frequency & cough 3 times before beginning the next setting.    Vest and Neb Therapy should be done 2 times/day.    Good Nutrition Can Improve Lung Function and Overall Health    Take ALL of your vitamins with food    Take 1/2 of your enzymes before EVERY meal/snack and the other 1/2 mid-meal/snack    Wt Readings from Last 3 Encounters:   08/07/23  73.4 kg (161 lb 13.1 oz)   08/07/23 73.4 kg (161 lb 12.8 oz)   04/03/23 74.8 kg (164 lb 14.5 oz)       There is no height or weight on file to calculate BMI.         National CF Foundation Recommendations for BMI in CF Adults: Women: at least 22 Men: at least 23        Controlling Blood Sugars Helps Prevent Lung Infections & Improves Nutrition  Test blood sugar:    In the morning before eating (goal is )    2 hours after a meal (goal is less than 150)    When pre-meal glucose is greater than 150 add correction    At bedtime (if less than 100 eat a snack with 15 grams of carbohydrates  Last A1C Results:   Hemoglobin A1C   Date Value Ref Range Status   08/07/2023 5.2 <5.7 % Final     Comment:     Normal <5.7%   Prediabetes 5.7-6.4%    Diabetes 6.5% or higher     Note: Adopted from ADA consensus guidelines.   12/07/2020 5.1 0 - 5.6 % Final     Comment:     Normal <5.7% Prediabetes 5.7-6.4%  Diabetes 6.5% or higher - adopted from ADA   consensus guidelines.           If diabetic, measure A1C every 6 months. Goal is under 7%.    Staying Healthy  Research: If you are interested in learning about research opportunities or have questions, please contact Lily Daniels at [phone] or [email or web address].    CF Foundation: Compass is a personalized resource service to help you with the insurance, financial, legal and other issues you are facing.  It's free, confidential and available to anyone with CF.  Ask your  for more information or contact Compass directly at [facility] ([phone]) or compass@cff.org, or learn more at cff.org/compass.       CF Nurse Line: Agatha Montiel and KJ: [phone]  Kanchan Licona or Vani Murphy RT: [phone]    Kaye Stark and Willow Mendes , Dieticians: [phone]    Mindy Pedro, Diabetes Nurse: [phone]   Yenny Yancey: [phone] or Lakisha Mayers at [phone], Social Workers  [email or web address]

## 2024-02-13 NOTE — NURSING NOTE
"Keon Conway is a 24 year old year old who is being seen for RECHECK (Return Cystic Fibrosis )      Medications reviewed and Vital signs taken.    Specimen Collection Type: Throat Swab    Order(s) placed: CF Aerobic Bacterial    *IF AFB order placed - please enter \"PRIORITIZE AFB\" to order comments.       No results found for: \"ACIDFAST\"      Lab Results   Component Value Date    AFBSMS Negative for acid fast bacteria 07/29/2019    AFBSMS  07/29/2019     A minimum of 5 mL of sputum or fluid is recommended for recovery of acid fast bacilli   (AFB).  Volumes less than 5 mL are suboptimal and may compromise recovery of AFB from   culture.      AFBSMS  07/29/2019     Assayed at [facility] [employer] [address] [place] [id number] [phone]         Medications reviewed and vital signs taken.   Lucila Iniguez CMA    "

## 2024-02-13 NOTE — LETTER
2/13/2024         RE: Keon Conway  [address]  [facility] [address]        Dear Colleague,    Thank you for referring your patient, Keon Conway, to the [facility] [place]. Please see a copy of my visit note below.    [facility]  February 13, 2024         Assessment and Plan:   Keon Conway is a 24 year old male with cystic fibrosis who is seen today in clinic for routine follow up. He was last seen in August 2023.     1. Moderate exacerbation of CF lung disease: with symptoms since last Thursday, slowly improving. Sating 98% on room air; vesting 1-2 times/day. PFTs today down > 500 ml. Previous cultures have grown out MSSA, H. Influenzae and Ps A. Symptoms, exam and PFTs all consistent with exacerbation.   - Start levaquin and noemi nebs X 3 weeks  - Resume nebs and vesting BID  - Chronic azithromycin 500 mg three times/week--hold  - Did place Allergy referral     2. CFTR modulation: on Trikafta and tolerating it well. Labs today WNL.   - Continue Trikafa     3. Reactive hypoglycemia:   Fasting hyperglycemia:  Abnormal OGTT: AIC of 5.2 Fasting BS of 110, peaked at 230 and down to 47, symptomatic. Does have a glucometer at home, did not address today.   - Check BS periodically  - OGTT annually    4. Exocrine pancreatic insufficiency: no s/s of malabsorption. BMI > CFF goal.   - Continue pancreatic enzymes and vitamin supplementation     5. Hypertriglyceridemia: elevated since 2005,  stable.     RTC: 3 weeks  Annual: August 2024; DEXA due 12/2023 (overdue, ordered)  Vaccinations: declines covid vaccine    Nita Cedeño PA-C  Pulmonary, Allergy, Critical Care and Sleep Medicine        Interval History:     Has been sick since last Thursday, had the chills, now resolved and a sore throat and cough. No fever,  or sinus or nasal congestion. Cough is more frequent and more  productive, no blood. Notes some congestion, but no tightness or shortness of breath with what he's done. Normally vesting 1 time/day, has vested 2 times/day the last few days. No chest pain or palpitations. No nausea or diarrhea, stools are normal and once/day.              Review of Systems:   Please see HPI. Otherwise, complete 10 point ROS negative.           Past Medical and Surgical History:     Past Medical History:   Diagnosis Date    CF (cystic fibrosis) (H) 11/30/2011    Chronic maxillary sinusitis 11/30/2011    Exocrine pancreatic insufficiency 11/30/2011     Past Surgical History:   Procedure Laterality Date    BRONCHOSCOPY (RIGID OR FLEXIBLE), DIAGNOSTIC N/A 10/2/2017    Procedure: COMBINED BRONCHOSCOPY (RIGID OR FLEXIBLE), LAVAGE;  Flexible Bronchoscopy With Lavage, PICC Line Placement ;  Surgeon: Darrel Dudley MD;  [facility]    CATARACT IOL, RT/LT Left     EXAM UNDER ANESTHESIA EYE(S) Left 3/17/2015    Procedure: EXAM UNDER ANESTHESIA EYE(S);  Surgeon: Aamir Taylor MD;  [facility]    HERNIA REPAIR  December 2014    INSERT PICC LINE Right 10/2/2017    Procedure: INSERT PICC LINE;;  Surgeon: Angeles Singh MD;  [facility]    LAPAROSCOPIC ASSISTED INSERTION TUBE GASTROTOMY N/A 10/16/2017    Procedure: LAPAROSCOPIC ASSISTED INSERTION TUBE GASTROSTOMY;  Laparoscopic Gastrostomy Tube Placement.;  Surgeon: Jeremy Obando MD;  [facility]    SCRUB ETHYLENEDIAMENETETRAACETIC (EDTA) Left 3/17/2015    Procedure: SCRUB ETHYLENEDIAMENETETRAACETIC (EDTA);  Surgeon: Aamir Taylor MD;  [facility]    SINUS SURGERY  3/2011           Family History:     Family History   Problem Relation Age of Onset    Diabetes Paternal Grandfather             Social History:     Social History     Socioeconomic History    Marital status: Single     Spouse name: Not on file    Number of children: Not on file    Years of education: Not on file    Highest education level: Not on file  "  Occupational History    Not on file   Tobacco Use    Smoking status: Never    Smokeless tobacco: Never   Substance and Sexual Activity    Alcohol use: No    Drug use: No    Sexual activity: Not on file   Other Topics Concern    Parent/sibling w/ CABG, MI or angioplasty before 65F 55M? Not Asked   Social History Narrative    Mom is 35, Dad 39, both healthy.     Social Determinants of Health     Financial Resource Strain: Not on file   Food Insecurity: Not on file   Transportation Needs: Not on file   Physical Activity: Not on file   Stress: Not on file   Social Connections: Not on file   Interpersonal Safety: Not on file   Housing Stability: Not on file            Medications:     Current Outpatient Medications   Medication    albuterol (VENTOLIN HFA) 108 (90 Base) MCG/ACT inhaler    azithromycin (ZITHROMAX) 500 MG tablet    blood glucose (ACCU-CHEK GUIDE) test strip    blood glucose monitoring (ACCU-CHEK FASTCLIX) lancets    dornase gloria (PULMOZYME) 2.5 MG/2.5ML neb solution    elexacaftor-tezacaftor-ivacaftor & ivacaftor (TRIKAFTA) 100-50-75 & 150 MG tablet pack    ipratropium - albuterol 0.5 mg/2.5 mg/3 mL (DUONEB) 0.5-2.5 (3) MG/3ML neb solution    levofloxacin (LEVAQUIN) 750 MG tablet    lipase-protease-amylase (CREON) 21366-49105-898201 units CPEP per EC capsule    mvw complete formulation (SOFTGELS ) capsule    tobramycin, PF, (NAMRATA) 300 MG/5ML neb solution     No current facility-administered medications for this visit.            Physical Exam:   /77   Pulse 112   Ht 1.72 m (5' 7.72\")   Wt 75.8 kg (167 lb)   SpO2 98%   BMI 25.60 kg/m      GENERAL: alert, NAD  HEENT: NCAT, EOMI, anicteric sclera, no oral mucosal edema or erythema  Neck: no cervical or supraclavicular adenopathy  Respiratory: moderate airflow, scattered crackles  CV: RRR, S1S2, no murmurs noted  Abdomen: normoactive BS, soft   Lymph: no edema  Neuro: AAO X 3, CN 2-12 grossly intact  Psychiatric: normal affect, good eye " contact  Skin: no rash, jaundice or lesions on limited exam         Data:   All laboratory and imaging data reviewed.      Cystic Fibrosis Culture  Specimen Description   Date Value Ref Range Status   04/05/2021 Throat  Final   12/07/2020 Throat  Final   02/03/2020 Throat  Final    Culture Micro   Date Value Ref Range Status   04/05/2021 Moderate growth  Normal branden    Final   12/07/2020 Moderate growth  Normal branden    Final   02/03/2020 Canceled, Test credited  Incorrect specimen type    Final   02/03/2020   Final    Notification of test cancellation was given to  Mindy in [facility]. They do not want a yeast culture instead. 2.3.20 at 1109 bw          PFT interpretation:  Maneuver: valid and meets ATS guidelines  Normal spirometry  Compared to prior: FEV1 fo 3.75 is 530 ml below prior        Respiratory Therapist Note:         Vest                Brand: Hill-Rom - traditional Hill Rom: Frequencies 8, 9, 10 at pressure 10 then frequencies 18, 19, 20 at pressure 6.                Cough Pause: Cough Pause; Yes                Vest Garment Size: Adult Small                Last Fitting Date: prn                Frequency of therapy: 7-14 times per week                Concerns:          Exercise (purposeful and aerobic for >20 minutes each session): NO.                Does this qualify as additional airway clearance: No         Alternative Airway Clearance: none         Nebulized Medications                Bronchodilators: Albuterol and Atrovent                Mucolytic: Pulmozyme                Antibiotics: NAMRATA (starting now for illness)                Additional Inhaled Medications: MDI (albuterol MDI)         Review Cleaning: Yes. Top rack of .         Education and Transition Information                Correct order of inhaled medications: Yes                Mechanism of Action of inhaled medications: Yes                Frequency of inhaled medications: Yes                Dosage of inhaled medications:  Yes        Home Care:                Nebulizer Cups (Brand/Type): Nery LC and sidestream                Nebulizer Compressor                            Year Purchased: 2022                            [facility], Phone: [phone], Fax: [phone]                Nebulizer Supply Company:                            [facility], Phone: [phone], Fax: [phone]         Oxygen: none         Pulmonary Rehab: none         Plan of Care and Goals for next visit: Neb cups and compressor filters ordered from [facility] today.         Nita Cedeño PA-C

## 2024-02-14 NOTE — TELEPHONE ENCOUNTER
Prior Authorization Not Needed per Insurance    Medication: TOBRAMYCIN (NAMRATA) 300 MG/5ML IN NEBU  Insurance Company: [employer] - Phone [phone] Fax [phone]  Expected CoPay: $    Pharmacy Filling the Rx: [facility] - [place], [place] - [facility] [address]   Pharmacy Notified:   Patient Notified:     Family is requesting to fill at [facility], message sent to liaison leadership for request to be submitted.

## 2024-02-15 NOTE — PATIENT INSTRUCTIONS
See provider AVS for a summary of recommendations from today's visit.  Liss Croft, PharmD  Cystic Fibrosis MTM Pharmacist  [facility]

## 2024-02-16 DIAGNOSIS — E84.9 EXACERBATION OF CYSTIC FIBROSIS (H): Primary | ICD-10-CM

## 2024-02-16 RX ORDER — TOBRAMYCIN 300 MG/4ML
300 SOLUTION RESPIRATORY (INHALATION) 2 TIMES DAILY
Qty: 168 ML | Refills: 0 | Status: SHIPPED | OUTPATIENT
Start: 2024-02-16 | End: 2024-04-16

## 2024-02-16 NOTE — TELEPHONE ENCOUNTER
[employer] override approved for [facility] to fill medication, insurance prefers [employer] brand. Liss, [facility] sent updated rx and test claim copay is $0    Message sent to [facility]

## 2024-02-18 LAB — BACTERIA SPT CULT: NORMAL

## 2024-03-01 DIAGNOSIS — E84.9 CYSTIC FIBROSIS (H): ICD-10-CM

## 2024-03-01 RX ORDER — ELEXACAFTOR, TEZACAFTOR, AND IVACAFTOR 100-50-75
KIT ORAL
Qty: 84 TABLET | Refills: 4 | Status: SHIPPED | OUTPATIENT
Start: 2024-03-01 | End: 2024-03-14

## 2024-03-18 ENCOUNTER — TELEPHONE (OUTPATIENT)
Dept: PULMONOLOGY | Facility: CLINIC | Age: 24
End: 2024-03-18
Payer: COMMERCIAL

## 2024-03-18 DIAGNOSIS — E84.9 CF (CYSTIC FIBROSIS) (H): ICD-10-CM

## 2024-03-18 NOTE — TELEPHONE ENCOUNTER
Prior Authorization Approval    Medication: PULMOZYME 2.5 MG/2.5ML IN SOLN  Authorization Effective Date: 3/18/2024  Authorization Expiration Date: 3/18/2025  Approved Dose/Quantity: 450ml for 90ds  Reference #: Key: [id number]   Insurance Company: [employer] [phone] Fax [phone]  Expected CoPay: $    CoPay Card Available:      Financial Assistance Needed:   Which Pharmacy is filling the prescription: [facility] [address]  Pharmacy Notified:   Patient Notified:           PA Initiation    Medication: PULMOZYME 2.5 MG/2.5ML IN SOLN  Insurance Company: [employer] Phone [phone] Fax [phone]  Pharmacy Filling the Rx:    Filling Pharmacy Phone:    Filling Pharmacy Fax:    Start Date: 3/18/2024    Key: [id number]

## 2024-03-27 ENCOUNTER — TELEPHONE (OUTPATIENT)
Dept: PULMONOLOGY | Facility: CLINIC | Age: 24
End: 2024-03-27
Payer: COMMERCIAL

## 2024-04-15 NOTE — PROGRESS NOTES
[facility]  April 16, 2024         Assessment and Plan:   Keon Conway is a 24 year old male with cystic fibrosis who is seen today in clinic for routine follow up. He started on levaquin and noemi nebs at last visit in February.     1. CF lung disease: resolved exacerbation and feels back to his baseline. No cough or dyspnea[place] Sating 99% on room air. Doing airway clearance daily. PFTs today improved 390 ml to his baseline, slightly below his best. Previous cultures have grown out MSSA, H. Influenzae and Ps A.   - Continue nebs and vesting daily  - Chronic azithromycin 500 mg three times/week  - Did place Allergy referral--will ask at next follow up     2. CFTR modulation: on Trikafta and tolerating it well. Labs WNL.   - Continue Trikafa  - Labs with annual studies in August     3. Reactive hypoglycemia:   Fasting hyperglycemia:  Abnormal OGTT: AIC of 5.2 Fasting BS of 110, peaked at 230 and down to 47, symptomatic. Does have a glucometer at home, but hasn't checked BS in a few months.  - OGTT annually with next visit.     4. Exocrine pancreatic insufficiency: no s/s of malabsorption. BMI > CFF goal.   - Continue pancreatic enzymes and vitamin supplementation     5. Hypertriglyceridemia: elevated since 2005, stable.     RTC: 3-4 months with annuals  Annual: August 2024; DEXA due 12/2023 (overdue, ordered)  Vaccinations: declines covid vaccine    Nita Cedeño PA-C  Pulmonary, Allergy, Critical Care and Sleep Medicine        Interval History:     Feel well and back to baseline. No new symptoms, no allergy symptoms. Doing nebs and vesting once/day which works for him. No bloating or gas, sinking stools.          Review of Systems:   Please see HPI. Otherwise, complete 10 point ROS negative.           Past Medical and Surgical History:     Past Medical History:   Diagnosis Date    CF (cystic fibrosis) (H) 11/30/2011    Chronic  maxillary sinusitis 11/30/2011    Exocrine pancreatic insufficiency 11/30/2011     Past Surgical History:   Procedure Laterality Date    BRONCHOSCOPY (RIGID OR FLEXIBLE), DIAGNOSTIC N/A 10/2/2017    Procedure: COMBINED BRONCHOSCOPY (RIGID OR FLEXIBLE), LAVAGE;  Flexible Bronchoscopy With Lavage, PICC Line Placement ;  Surgeon: Darrel Dudley MD;  [facility]    CATARACT IOL, RT/LT Left     EXAM UNDER ANESTHESIA EYE(S) Left 3/17/2015    Procedure: EXAM UNDER ANESTHESIA EYE(S);  Surgeon: Aamir Taylor MD;  [facility]    HERNIA REPAIR  December 2014    INSERT PICC LINE Right 10/2/2017    Procedure: INSERT PICC LINE;;  Surgeon: Angeles Singh MD;  [facility]    LAPAROSCOPIC ASSISTED INSERTION TUBE GASTROTOMY N/A 10/16/2017    Procedure: LAPAROSCOPIC ASSISTED INSERTION TUBE GASTROSTOMY;  Laparoscopic Gastrostomy Tube Placement.;  Surgeon: Jeremy Obando MD;  [facility]    SCRUB ETHYLENEDIAMENETETRAACETIC (EDTA) Left 3/17/2015    Procedure: SCRUB ETHYLENEDIAMENETETRAACETIC (EDTA);  Surgeon: Aamir Taylor MD;  [facility]    SINUS SURGERY  3/2011           Family History:     Family History   Problem Relation Age of Onset    Diabetes Paternal Grandfather             Social History:     Social History     Socioeconomic History    Marital status: Single     Spouse name: Not on file    Number of children: Not on file    Years of education: Not on file    Highest education level: Not on file   Occupational History    Not on file   Tobacco Use    Smoking status: Never    Smokeless tobacco: Never   Substance and Sexual Activity    Alcohol use: No    Drug use: No    Sexual activity: Not on file   Other Topics Concern    Parent/sibling w/ CABG, MI or angioplasty before 65F 55M? Not Asked   Social History Narrative    Mom is 35, Dad 39, both healthy.     Social Determinants of Health     Financial Resource Strain: Not on file   Food Insecurity: Not on file   Transportation Needs: Not  "on file   Physical Activity: Not on file   Stress: Not on file   Social Connections: Not on file   Interpersonal Safety: Not on file   Housing Stability: Not on file            Medications:     Current Outpatient Medications   Medication Sig Dispense Refill    albuterol (VENTOLIN HFA) 108 (90 Base) MCG/ACT inhaler Inhale 2 puffs into the lungs every 4 hours as needed. 8.5 g 3    azithromycin (ZITHROMAX) 500 MG tablet Take 1 tablet (500 mg) by mouth three times a week 12 tablet 11    blood glucose (ACCU-CHEK GUIDE) test strip Use to test blood sugar 1 times daily or as directed. 50 strip 11    blood glucose monitoring (ACCU-CHEK FASTCLIX) lancets Use to test blood sugar 1 times daily or as directed. 102 each 3    dornase gloria (PULMOZYME) 2.5 MG/2.5ML neb solution Inhale 2.5 mg into the lungs 2 times daily 450 mL 3    elexacaftor-tezacaftor-ivacaftor & ivacaftor (TRIKAFTA) 100-50-75 & 150 MG tablet pack TAKE 2 ORANGE TABLETS BY  MOUTH IN THE MORNING AND 1  BLUE TABLET IN THE EVENING  WITH FAT-CONTAINING FOOD  (12 HOURS APART) 84 tablet 4    ipratropium - albuterol 0.5 mg/2.5 mg/3 mL (DUONEB) 0.5-2.5 (3) MG/3ML neb solution INHALE THE CONTENTS OF ONE VIAL INTO THE LUNGS BY NEBULIZATION THREE TIMES DAILY 360 mL 3    lipase-protease-amylase (CREON) [id number] units CPEP per EC capsule TAKE 6 CAPSULES BY MOUTH WITH MEALS AND 3 CAPSULES WITH SNACKS *3 MEALS/3 SNACKS PER  capsule 11    mvw complete formulation (SOFTGELS ) capsule Take 1 capsule by mouth daily 60 capsule 11     No current facility-administered medications for this visit.            Physical Exam:   /80   Pulse 87   Ht 1.72 m (5' 7.72\")   Wt 78.5 kg (173 lb 1 oz)   SpO2 99%   BMI 26.53 kg/m      GENERAL: alert, NAD  HEENT: NCAT, EOMI, anicteric sclera, no oral mucosal edema or erythema  Neck: no cervical or supraclavicular adenopathy  Respiratory: good airflow, mainly clear  CV: RRR, S1S2, no murmurs noted  Abdomen: normoactive " BS, soft   Lymph: no edema  Neuro: AAO X 3, CN 2-12 grossly intact  Psychiatric: normal affect, good eye contact  Skin: no rash, jaundice or lesions on limited exam         Data:   All laboratory and imaging data reviewed.      Cystic Fibrosis Culture  Specimen Description   Date Value Ref Range Status   04/05/2021 Throat  Final   12/07/2020 Throat  Final   02/03/2020 Throat  Final    Culture Micro   Date Value Ref Range Status   04/05/2021 Moderate growth  Normal branden    Final   12/07/2020 Moderate growth  Normal branden    Final   02/03/2020 Canceled, Test credited  Incorrect specimen type    Final   02/03/2020   Final    Notification of test cancellation was given to  Mindy in [facility]. They do not want a yeast culture instead. 2.3.20 at 1109 bw          PFT interpretation:  Maneuver: valid and meets ATS guidelines  Normal spirometry

## 2024-04-16 ENCOUNTER — OFFICE VISIT (OUTPATIENT)
Dept: PULMONOLOGY | Facility: CLINIC | Age: 24
End: 2024-04-16
Attending: PHYSICIAN ASSISTANT
Payer: COMMERCIAL

## 2024-04-16 VITALS
DIASTOLIC BLOOD PRESSURE: 80 MMHG | OXYGEN SATURATION: 99 % | HEART RATE: 87 BPM | BODY MASS INDEX: 26.23 KG/M2 | HEIGHT: 68 IN | WEIGHT: 173.06 LBS | SYSTOLIC BLOOD PRESSURE: 128 MMHG

## 2024-04-16 DIAGNOSIS — E84.9 CF (CYSTIC FIBROSIS) (H): ICD-10-CM

## 2024-04-16 DIAGNOSIS — E84.9 CF (CYSTIC FIBROSIS) (H): Primary | ICD-10-CM

## 2024-04-16 PROCEDURE — 99214 OFFICE O/P EST MOD 30 MIN: CPT | Mod: 25 | Performed by: PHYSICIAN ASSISTANT

## 2024-04-16 PROCEDURE — 99212 OFFICE O/P EST SF 10 MIN: CPT | Performed by: PHYSICIAN ASSISTANT

## 2024-04-16 PROCEDURE — 94375 RESPIRATORY FLOW VOLUME LOOP: CPT | Performed by: PHYSICIAN ASSISTANT

## 2024-04-16 PROCEDURE — 87070 CULTURE OTHR SPECIMN AEROBIC: CPT | Performed by: PHYSICIAN ASSISTANT

## 2024-04-16 RX ORDER — ALBUTEROL SULFATE 90 UG/1
AEROSOL, METERED RESPIRATORY (INHALATION)
Qty: 8.5 G | Refills: 3 | Status: CANCELLED | OUTPATIENT
Start: 2024-04-16

## 2024-04-16 ASSESSMENT — PAIN SCALES - GENERAL: PAINLEVEL: NO PAIN (0)

## 2024-04-16 NOTE — LETTER
4/16/2024         RE: Keon Conway  [address]  [facility] [address]        Dear Colleague,    Thank you for referring your patient, Keon Conway, to the [facility] [place]. Please see a copy of my visit note below.    [facility]  April 16, 2024         Assessment and Plan:   Keon Conway is a 24 year old male with cystic fibrosis who is seen today in clinic for routine follow up. He started on levaquin and noemi nebs at last visit in February.     1. CF lung disease: resolved exacerbation and feels back to his baseline. No cough or dyspnea[place]. Sating 99% on room air. Doing airway clearance daily. PFTs today improved 390 ml to his baseline, slightly below his best. Previous cultures have grown out MSSA, H. Influenzae and Ps A.   - Continue nebs and vesting daily  - Chronic azithromycin 500 mg three times/week  - Did place Allergy referral--will ask at next follow up     2. CFTR modulation: on Trikafta and tolerating it well. Labs WNL.   - Continue Trikafa  - Labs with annual studies in August     3. Reactive hypoglycemia:   Fasting hyperglycemia:  Abnormal OGTT: AIC of 5.2 Fasting BS of 110, peaked at 230 and down to 47, symptomatic. Does have a glucometer at home, but hasn't checked BS in a few months.  - OGTT annually with next visit.     4. Exocrine pancreatic insufficiency: no s/s of malabsorption. BMI > CFF goal.   - Continue pancreatic enzymes and vitamin supplementation     5. Hypertriglyceridemia: elevated since 2005, stable.     RTC: 3-4 months with annuals  Annual: August 2024; DEXA due 12/2023 (overdue, ordered)  Vaccinations: declines covid vaccine    Nita Cedeño PA-C  Pulmonary, Allergy, Critical Care and Sleep Medicine        Interval History:     Feel well and back to baseline. No new symptoms, no allergy symptoms. Doing nebs and vesting  once/day which works for him. No bloating or gas, sinking stools.          Review of Systems:   Please see HPI. Otherwise, complete 10 point ROS negative.           Past Medical and Surgical History:     Past Medical History:   Diagnosis Date     CF (cystic fibrosis) (H) 11/30/2011     Chronic maxillary sinusitis 11/30/2011     Exocrine pancreatic insufficiency 11/30/2011     Past Surgical History:   Procedure Laterality Date     BRONCHOSCOPY (RIGID OR FLEXIBLE), DIAGNOSTIC N/A 10/2/2017    Procedure: COMBINED BRONCHOSCOPY (RIGID OR FLEXIBLE), LAVAGE;  Flexible Bronchoscopy With Lavage, PICC Line Placement ;  Surgeon: Darrel Dudley MD;  [facility]     CATARACT IOL, RT/LT Left      EXAM UNDER ANESTHESIA EYE(S) Left 3/17/2015    Procedure: EXAM UNDER ANESTHESIA EYE(S);  Surgeon: Aamir Taylor MD;  [facility]     HERNIA REPAIR  December 2014     INSERT PICC LINE Right 10/2/2017    Procedure: INSERT PICC LINE;;  Surgeon: Angeles Singh MD;  [facility]     LAPAROSCOPIC ASSISTED INSERTION TUBE GASTROTOMY N/A 10/16/2017    Procedure: LAPAROSCOPIC ASSISTED INSERTION TUBE GASTROSTOMY;  Laparoscopic Gastrostomy Tube Placement.;  Surgeon: Jeremy Obando MD;  [facility]     SCRUB ETHYLENEDIAMENETETRAACETIC (EDTA) Left 3/17/2015    Procedure: SCRUB ETHYLENEDIAMENETETRAACETIC (EDTA);  Surgeon: Aamir Taylor MD;  [facility]     SINUS SURGERY  3/2011           Family History:     Family History   Problem Relation Age of Onset     Diabetes Paternal Grandfather             Social History:     Social History     Socioeconomic History     Marital status: Single     Spouse name: Not on file     Number of children: Not on file     Years of education: Not on file     Highest education level: Not on file   Occupational History     Not on file   Tobacco Use     Smoking status: Never     Smokeless tobacco: Never   Substance and Sexual Activity     Alcohol use: No     Drug use: No     Sexual  activity: Not on file   Other Topics Concern     Parent/sibling w/ CABG, MI or angioplasty before 65F 55M? Not Asked   Social History Narrative    Mom is 35, Dad 39, both healthy.     Social Determinants of Health     Financial Resource Strain: Not on file   Food Insecurity: Not on file   Transportation Needs: Not on file   Physical Activity: Not on file   Stress: Not on file   Social Connections: Not on file   Interpersonal Safety: Not on file   Housing Stability: Not on file            Medications:     Current Outpatient Medications   Medication Sig Dispense Refill     albuterol (VENTOLIN HFA) 108 (90 Base) MCG/ACT inhaler Inhale 2 puffs into the lungs every 4 hours as needed. 8.5 g 3     azithromycin (ZITHROMAX) 500 MG tablet Take 1 tablet (500 mg) by mouth three times a week 12 tablet 11     blood glucose (ACCU-CHEK GUIDE) test strip Use to test blood sugar 1 times daily or as directed. 50 strip 11     blood glucose monitoring (ACCU-CHEK FASTCLIX) lancets Use to test blood sugar 1 times daily or as directed. 102 each 3     dornase gloria (PULMOZYME) 2.5 MG/2.5ML neb solution Inhale 2.5 mg into the lungs 2 times daily 450 mL 3     elexacaftor-tezacaftor-ivacaftor & ivacaftor (TRIKAFTA) 100-50-75 & 150 MG tablet pack TAKE 2 ORANGE TABLETS BY  MOUTH IN THE MORNING AND 1  BLUE TABLET IN THE EVENING  WITH FAT-CONTAINING FOOD  (12 HOURS APART) 84 tablet 4     ipratropium - albuterol 0.5 mg/2.5 mg/3 mL (DUONEB) 0.5-2.5 (3) MG/3ML neb solution INHALE THE CONTENTS OF ONE VIAL INTO THE LUNGS BY NEBULIZATION THREE TIMES DAILY 360 mL 3     lipase-protease-amylase (CREON) 46850-66191-242967 units CPEP per EC capsule TAKE 6 CAPSULES BY MOUTH WITH MEALS AND 3 CAPSULES WITH SNACKS *3 MEALS/3 SNACKS PER  capsule 11     mvw complete formulation (SOFTGELS ) capsule Take 1 capsule by mouth daily 60 capsule 11     No current facility-administered medications for this visit.            Physical Exam:   /80   Pulse 87  "  Ht 1.72 m (5' 7.72\")   Wt 78.5 kg (173 lb 1 oz)   SpO2 99%   BMI 26.53 kg/m      GENERAL: alert, NAD  HEENT: NCAT, EOMI, anicteric sclera, no oral mucosal edema or erythema  Neck: no cervical or supraclavicular adenopathy  Respiratory: good airflow, mainly clear  CV: RRR, S1S2, no murmurs noted  Abdomen: normoactive BS, soft   Lymph: no edema  Neuro: AAO X 3, CN 2-12 grossly intact  Psychiatric: normal affect, good eye contact  Skin: no rash, jaundice or lesions on limited exam         Data:   All laboratory and imaging data reviewed.      Cystic Fibrosis Culture  Specimen Description   Date Value Ref Range Status   04/05/2021 Throat  Final   12/07/2020 Throat  Final   02/03/2020 Throat  Final    Culture Micro   Date Value Ref Range Status   04/05/2021 Moderate growth  Normal branden    Final   12/07/2020 Moderate growth  Normal branden    Final   02/03/2020 Canceled, Test credited  Incorrect specimen type    Final   02/03/2020   Final    Notification of test cancellation was given to  Mindy in [facility]. They do not want a yeast culture instead. 2.3.20 at 1109 bw          PFT interpretation:  Maneuver: valid and meets ATS guidelines  Normal spirometry        Again, thank you for allowing me to participate in the care of your patient.        Sincerely,        Nita Cedeño PA-C  "

## 2024-04-16 NOTE — NURSING NOTE
"Keon Conway is a 24 year old year old who is being seen for No chief complaint on file.      Medications reviewed and Vital signs taken.    Specimen Collection Type: Throat Swab    Order(s) placed: CF Aerobic Bacterial    *IF AFB order placed - please enter \"PRIORITIZE AFB\" to order comments.       No results found for: \"ACIDFAST\"      Lab Results   Component Value Date    AFBSMS Negative for acid fast bacteria 07/29/2019    AFBSMS  07/29/2019     A minimum of 5 mL of sputum or fluid is recommended for recovery of acid fast bacilli   (AFB).  Volumes less than 5 mL are suboptimal and may compromise recovery of AFB from   culture.      AFBSMS  07/29/2019     Assayed at [facility] [address] [place] [id number] [phone]     Vitals were taken and medications were reconciled.     Etelvina BREWER  9:31 AM        "

## 2024-04-17 LAB
EXPTIME-PRE: 6.06 SEC
FEF2575-%PRED-PRE: 97 %
FEF2575-PRE: 4.33 L/SEC
FEF2575-PRED: 4.42 L/SEC
FEFMAX-%PRED-PRE: 112 %
FEFMAX-PRE: 10.96 L/SEC
FEFMAX-PRED: 9.74 L/SEC
FEV1-%PRED-PRE: 102 %
FEV1-PRE: 4.14 L
FEV1FEV6-PRE: 83 %
FEV1FEV6-PRED: 84 %
FEV1FVC-PRE: 83 %
FEV1FVC-PRED: 86 %
FIFMAX-PRE: 8.66 L/SEC
FVC-%PRED-PRE: 104 %
FVC-PRE: 4.99 L
FVC-PRED: 4.76 L

## 2024-04-21 LAB — BACTERIA SPEC CULT: NORMAL

## 2024-06-03 ENCOUNTER — TELEPHONE (OUTPATIENT)
Dept: PULMONOLOGY | Facility: CLINIC | Age: 24
End: 2024-06-03

## 2024-06-03 NOTE — TELEPHONE ENCOUNTER
PA Initiation    Medication: CREON [id number] UNITS PO CPEP  Insurance Company: [employer] - Phone [phone] Fax [phone]  Pharmacy Filling the Rx:    Filling Pharmacy Phone:    Filling Pharmacy Fax:    Start Date: 6/3/2024    Key: [id number]    The covered alternative is Pancreaze. If your patient has tried this drug, please provide drug strength, date(s) taken and for how long, and what the documented results were of taking this drug, including any intolerances or adverse reactions your patientexperienced. If your patient has NOT tried this drug, please provide details why your patient can't try this alternative.

## 2024-06-04 NOTE — TELEPHONE ENCOUNTER
PA Initiation    Medication: CREON [id number] UNITS PO CPEP  Insurance Company: [employer] - Phone [phone] Fax [phone]  Pharmacy Filling the Rx:    Filling Pharmacy Phone:    Filling Pharmacy Fax:    Start Date: 6/3/2024    Key: [id number]

## 2024-06-11 NOTE — TELEPHONE ENCOUNTER
PRIOR AUTHORIZATION DENIED    Medication: CREON [id number] UNITS PO CPEP  Insurance Company: [employer] - Phone [phone] Fax [phone]  Denial Date: 6/5/2024  Denial Reason(s):       Appeal Information:     Patient Notified:

## 2024-06-14 DIAGNOSIS — E84.9 CF (CYSTIC FIBROSIS) (H): Primary | ICD-10-CM

## 2024-06-14 DIAGNOSIS — E84.9 EXACERBATION OF CYSTIC FIBROSIS (H): ICD-10-CM

## 2024-06-14 DIAGNOSIS — K86.81 EXOCRINE PANCREATIC INSUFFICIENCY: ICD-10-CM

## 2024-06-14 NOTE — PROGRESS NOTES
Nutrition Note    Notified by [email or web address] tech that prior authorization for Creon was denied. Pt must try/fail on Pancreaze.     Ordered Pancreaze 21,000 - 5-7 caps with meals and 3 with snacks. Notified pt via [employer].     Willow Mendes RD, LD, CACFD  Cystic Fibrosis/Lung Transplant Dietitian  Available via [employer]

## 2024-06-22 ENCOUNTER — HEALTH MAINTENANCE LETTER (OUTPATIENT)
Age: 24
End: 2024-06-22

## 2024-06-24 ENCOUNTER — TELEPHONE (OUTPATIENT)
Dept: PULMONOLOGY | Facility: CLINIC | Age: 24
End: 2024-06-24
Payer: COMMERCIAL

## 2024-06-24 NOTE — TELEPHONE ENCOUNTER
Received fax from [employer] for a PA due to a cost exceeds maximum. Called [employer] as directed on CMM when trying to submit PA. Was transferred to [employer] and then back to [employer]. Was then transferred back to [employer], transferred 4 times internally before being told a PA needs to be completed and was transferred again to coverage review. After stating request, call was dropped.    Completed and faxed PA form as urgent.

## 2024-06-24 NOTE — TELEPHONE ENCOUNTER
Received fax from [employer] that PA is not required.        Faxed back with cost exceeds max issue on cover page. Called [employer], they will try to call [employer] and see if they can get an answer.

## 2024-07-02 NOTE — TELEPHONE ENCOUNTER
Called [facility] ([phone]) spoke to Kimi they are working on cost exceeds maximum override was not needed there is $70 copay on it.

## 2024-07-10 ENCOUNTER — TELEPHONE (OUTPATIENT)
Dept: PULMONOLOGY | Facility: CLINIC | Age: 24
End: 2024-07-10
Payer: COMMERCIAL

## 2024-07-10 NOTE — TELEPHONE ENCOUNTER
[facility] Call Center    Phone Message    May a detailed message be left on voicemail: yes     Reason for Call: Medication Question or concern regarding medication   Prescription Clarification  Name of Medication: lipase-protease-amylase (PANCREAZE) [id number] units CPEP  Prescribing Provider: Nita Cedeño   [id number] [address]      What on the order needs clarification? Pharmacy call stated that the direction on the RX stated 3 capsules with meal, per pharmacy they have change the direction from 3 capsule with mean to 3 capsule with snack. Per pharmacy if anything please connect with them. Thank you     Action Taken: Other: Pulm    Travel Screening: Not Applicable     Date of Service:

## 2024-08-14 DIAGNOSIS — E84.9 CYSTIC FIBROSIS (H): ICD-10-CM

## 2024-08-14 RX ORDER — ELEXACAFTOR, TEZACAFTOR, AND IVACAFTOR 100-50-75
KIT ORAL
Qty: 84 TABLET | Refills: 0 | Status: SHIPPED | OUTPATIENT
Start: 2024-08-14 | End: 2024-08-27

## 2024-08-26 NOTE — PROGRESS NOTES
[facility]  August 27, 2024         Assessment and Plan:   Keon Conway is a 24 year old male with cystic fibrosis who is seen today in clinic for routine follow up.     1. CF lung disease: no new pulmonary complaints, feeling really well, no cough or dyspnea. [place] farm. Sating 100% on room air. Doing airway clearance daily. PFTs today are excellent. Previous cultures have grown out MSSA, H. Influenzae and Ps A.   - Continue nebs and vesting daily  - Chronic azithromycin 500 mg three times/week  - Will give Allergy referral locally to assess medication allergies     2. CFTR modulation: on Trikafta and tolerating it well. Labs WNL.   - Continue Trikafa     3. Reactive hypoglycemia:   Fasting hyperglycemia:  Abnormal OGTT: AIC of 5.2 Fasting BS of 106, peaked > 200, but did not have a reactive low this time. Does have a glucometer at home, but hasn't checked BS in a few months.  - OGTT annually     4. Exocrine pancreatic insufficiency: no s/s of malabsorption. BMI > CFF goal. Vitamin D level low at 22. BHARATH Daugherty, to see today.  - Continue pancreatic enzymes and vitamin supplementation     5. Hypertriglyceridemia: elevated since 2005, mainly in the 200s. Up to 571 today.   - Will recheck labs in two months, if still elevated, will have the patient see Cardiology    RTC:  4 months  Annual: August 2025 (DEXA > August 2026)  Vaccinations: discussed flu and covid vaccine recommendations, typically declines    Nita Cedeño PA-C  Pulmonary, Allergy, Critical Care and Sleep Medicine        Interval History:     No recent illnesses, no allergy complaints today. No cough, no shortness of breath or tightness. Nebs and vesting daily. No new GI compalints, stools are stable with no floaty or fatty stools.          Review of Systems:   Please see HPI. Otherwise, complete 10 point ROS negative.           Past Medical and Surgical History:     Past  Medical History:   Diagnosis Date    CF (cystic fibrosis) (H) 11/30/2011    Chronic maxillary sinusitis 11/30/2011    Exocrine pancreatic insufficiency 11/30/2011     Past Surgical History:   Procedure Laterality Date    BRONCHOSCOPY (RIGID OR FLEXIBLE), DIAGNOSTIC N/A 10/2/2017    Procedure: COMBINED BRONCHOSCOPY (RIGID OR FLEXIBLE), LAVAGE;  Flexible Bronchoscopy With Lavage, PICC Line Placement ;  Surgeon: Darrel Dudley MD;  [facility]    CATARACT IOL, RT/LT Left     EXAM UNDER ANESTHESIA EYE(S) Left 3/17/2015    Procedure: EXAM UNDER ANESTHESIA EYE(S);  Surgeon: Aamir Taylor MD;  [facility]    HERNIA REPAIR  December 2014    INSERT PICC LINE Right 10/2/2017    Procedure: INSERT PICC LINE;;  Surgeon: Angeles Singh MD;  [facility]    LAPAROSCOPIC ASSISTED INSERTION TUBE GASTROTOMY N/A 10/16/2017    Procedure: LAPAROSCOPIC ASSISTED INSERTION TUBE GASTROSTOMY;  Laparoscopic Gastrostomy Tube Placement.;  Surgeon: Jeremy Obando MD;  [facility]    SCRUB ETHYLENEDIAMENETETRAACETIC (EDTA) Left 3/17/2015    Procedure: SCRUB ETHYLENEDIAMENETETRAACETIC (EDTA);  Surgeon: Aamir Taylor MD;  [facility]    SINUS SURGERY  3/2011           Family History:     Family History   Problem Relation Age of Onset    Diabetes Paternal Grandfather             Social History:     Social History     Socioeconomic History    Marital status: Single     Spouse name: Not on file    Number of children: Not on file    Years of education: Not on file    Highest education level: Not on file   Occupational History    Not on file   Tobacco Use    Smoking status: Never    Smokeless tobacco: Never   Substance and Sexual Activity    Alcohol use: No    Drug use: No    Sexual activity: Not on file   Other Topics Concern    Parent/sibling w/ CABG, MI or angioplasty before 65F 55M? Not Asked   Social History Narrative    Mom is 35, Dad 39, both healthy.     Social Determinants of Health     Financial  Resource Strain: Not on file   Food Insecurity: Not on file   Transportation Needs: Not on file   Physical Activity: Not on file   Stress: Not on file   Social Connections: Not on file   Interpersonal Safety: Not on file   Housing Stability: Not on file            Medications:     Current Outpatient Medications   Medication Sig Dispense Refill    elexacaftor-tezacaftor-ivacaftor & ivacaftor (TRIKAFTA) 100-50-75 & 150 MG tablet pack TAKE 2 ORANGE TABLETS BY  MOUTH IN THE MORNING AND 1  BLUE TABLET IN THE EVENING  WITH FAT-CONTAINING FOOD  (12 HOURS APART). 8/14: please attend appt with labs on 8/27 for additional refills 84 tablet 5    albuterol (VENTOLIN HFA) 108 (90 Base) MCG/ACT inhaler Inhale 2 puffs into the lungs every 4 hours as needed. 8.5 g 3    azithromycin (ZITHROMAX) 500 MG tablet Take 1 tablet (500 mg) by mouth three times a week 12 tablet 11    blood glucose (ACCU-CHEK GUIDE) test strip Use to test blood sugar 1 times daily or as directed. 50 strip 11    blood glucose monitoring (ACCU-CHEK FASTCLIX) lancets Use to test blood sugar 1 times daily or as directed. 102 each 3    dornase gloria (PULMOZYME) 2.5 MG/2.5ML neb solution Inhale 2.5 mg into the lungs 2 times daily 450 mL 3    ipratropium - albuterol 0.5 mg/2.5 mg/3 mL (DUONEB) 0.5-2.5 (3) MG/3ML neb solution INHALE THE CONTENTS OF ONE VIAL INTO THE LUNGS BY NEBULIZATION THREE TIMES DAILY 360 mL 3    lipase-protease-amylase (CREON) [id number] units CPEP per EC capsule TAKE 6 CAPSULES BY MOUTH WITH MEALS AND 3 CAPSULES WITH SNACKS *3 MEALS/3 SNACKS PER  capsule 11    lipase-protease-amylase (PANCREAZE) [id number] units CPEP Take 5-7 capsules by mouth 3 times daily (with meals) And take 3 capsules with meals. Total 3 meals and 3 snacks daily for maximum 30 capsules per day. 900 capsule 11    mvw complete formulation (SOFTGELS ) capsule Take 1 capsule by mouth daily 60 capsule 11     No current facility-administered medications  "for this visit.            Physical Exam:   /82   Pulse 89   Ht 1.72 m (5' 7.72\")   Wt 79.4 kg (175 lb 0.7 oz)   SpO2 100%   BMI 26.84 kg/m      GENERAL: alert, NAD  HEENT: NCAT, EOMI, anicteric sclera, no oral mucosal edema or erythema  Neck: no cervical or supraclavicular adenopathy  Respiratory: good airflow, mainly clear  CV: RRR, S1S2, no murmurs noted  Abdomen: normoactive BS, soft   Lymph: no edema  Neuro: AAO X 3, CN 2-12 grossly intact  Psychiatric: normal affect, good eye contact  Skin: no rash, jaundice or lesions on limited exam         Data:   All laboratory and imaging data reviewed.      Cystic Fibrosis Culture  Specimen Description   Date Value Ref Range Status   04/05/2021 Throat  Final   12/07/2020 Throat  Final   02/03/2020 Throat  Final    Culture Micro   Date Value Ref Range Status   04/05/2021 Moderate growth  Normal branden    Final   12/07/2020 Moderate growth  Normal branden    Final   02/03/2020 Canceled, Test credited  Incorrect specimen type    Final   02/03/2020   Final    Notification of test cancellation was given to  Mindy in [facility]. They do not want a yeast culture instead. 2.3.20 at 1109 bw          PFT interpretation:  Maneuver: valid and meets ATS guidelines  Normal spirometry      "

## 2024-08-27 ENCOUNTER — OFFICE VISIT (OUTPATIENT)
Dept: PULMONOLOGY | Facility: CLINIC | Age: 24
End: 2024-08-27
Attending: PHYSICIAN ASSISTANT
Payer: COMMERCIAL

## 2024-08-27 ENCOUNTER — ANCILLARY PROCEDURE (OUTPATIENT)
Dept: GENERAL RADIOLOGY | Facility: CLINIC | Age: 24
End: 2024-08-27
Attending: PHYSICIAN ASSISTANT
Payer: COMMERCIAL

## 2024-08-27 ENCOUNTER — ANCILLARY PROCEDURE (OUTPATIENT)
Dept: BONE DENSITY | Facility: CLINIC | Age: 24
End: 2024-08-27
Attending: PHYSICIAN ASSISTANT
Payer: COMMERCIAL

## 2024-08-27 ENCOUNTER — INFUSION THERAPY VISIT (OUTPATIENT)
Dept: INFUSION THERAPY | Facility: CLINIC | Age: 24
End: 2024-08-27
Attending: PHYSICIAN ASSISTANT
Payer: COMMERCIAL

## 2024-08-27 ENCOUNTER — LAB (OUTPATIENT)
Dept: LAB | Facility: CLINIC | Age: 24
End: 2024-08-27
Attending: PHYSICIAN ASSISTANT
Payer: COMMERCIAL

## 2024-08-27 ENCOUNTER — CLINICAL UPDATE (OUTPATIENT)
Dept: PHARMACY | Facility: CLINIC | Age: 24
End: 2024-08-27

## 2024-08-27 ENCOUNTER — ALLIED HEALTH/NURSE VISIT (OUTPATIENT)
Dept: CARE COORDINATION | Facility: CLINIC | Age: 24
End: 2024-08-27

## 2024-08-27 VITALS
WEIGHT: 175.04 LBS | DIASTOLIC BLOOD PRESSURE: 82 MMHG | BODY MASS INDEX: 26.53 KG/M2 | SYSTOLIC BLOOD PRESSURE: 129 MMHG | HEIGHT: 68 IN | HEART RATE: 89 BPM | OXYGEN SATURATION: 100 %

## 2024-08-27 VITALS
DIASTOLIC BLOOD PRESSURE: 82 MMHG | WEIGHT: 175 LBS | TEMPERATURE: 98.2 F | SYSTOLIC BLOOD PRESSURE: 129 MMHG | HEART RATE: 89 BPM | OXYGEN SATURATION: 100 % | RESPIRATION RATE: 16 BRPM | BODY MASS INDEX: 26.83 KG/M2

## 2024-08-27 DIAGNOSIS — E84.0 CYSTIC FIBROSIS WITH PULMONARY MANIFESTATIONS (H): Primary | ICD-10-CM

## 2024-08-27 DIAGNOSIS — E84.9 CYSTIC FIBROSIS (H): ICD-10-CM

## 2024-08-27 DIAGNOSIS — E55.9 HYPOVITAMINOSIS D: ICD-10-CM

## 2024-08-27 DIAGNOSIS — E84.9 CF (CYSTIC FIBROSIS) (H): ICD-10-CM

## 2024-08-27 DIAGNOSIS — E84.9 CF (CYSTIC FIBROSIS) (H): Primary | ICD-10-CM

## 2024-08-27 DIAGNOSIS — Z13.9 RISK AND FUNCTIONAL ASSESSMENT: Primary | ICD-10-CM

## 2024-08-27 DIAGNOSIS — K86.81 EXOCRINE PANCREATIC INSUFFICIENCY: ICD-10-CM

## 2024-08-27 DIAGNOSIS — E84.9 CYSTIC FIBROSIS (H): Primary | ICD-10-CM

## 2024-08-27 LAB
ALBUMIN SERPL BCG-MCNC: 4.6 G/DL (ref 3.5–5.2)
ALBUMIN UR-MCNC: NEGATIVE MG/DL
ALP SERPL-CCNC: 113 U/L (ref 40–150)
ALT SERPL W P-5'-P-CCNC: 30 U/L (ref 0–70)
ANION GAP SERPL CALCULATED.3IONS-SCNC: 13 MMOL/L (ref 7–15)
APPEARANCE UR: CLEAR
AST SERPL W P-5'-P-CCNC: 30 U/L (ref 0–45)
BASOPHILS # BLD AUTO: 0.1 10E3/UL (ref 0–0.2)
BASOPHILS NFR BLD AUTO: 1 %
BILIRUB DIRECT SERPL-MCNC: <0.2 MG/DL (ref 0–0.3)
BILIRUB SERPL-MCNC: 0.3 MG/DL
BILIRUB UR QL STRIP: NEGATIVE
BUN SERPL-MCNC: 11.7 MG/DL (ref 6–20)
CALCIUM SERPL-MCNC: 9.3 MG/DL (ref 8.8–10.4)
CHLORIDE SERPL-SCNC: 104 MMOL/L (ref 98–107)
CHOLEST SERPL-MCNC: 178 MG/DL
CK SERPL-CCNC: 123 U/L (ref 39–308)
COLOR UR AUTO: ABNORMAL
CREAT SERPL-MCNC: 0.75 MG/DL (ref 0.67–1.17)
CREAT UR-MCNC: 103 MG/DL
EBV DNA SERPL NAA+PROBE-ACNC: NOT DETECTED IU/ML
EGFRCR SERPLBLD CKD-EPI 2021: >90 ML/MIN/1.73M2
EOSINOPHIL # BLD AUTO: 0.1 10E3/UL (ref 0–0.7)
EOSINOPHIL NFR BLD AUTO: 3 %
ERYTHROCYTE [DISTWIDTH] IN BLOOD BY AUTOMATED COUNT: 12.3 % (ref 10–15)
ERYTHROCYTE [SEDIMENTATION RATE] IN BLOOD BY WESTERGREN METHOD: 3 MM/HR (ref 0–15)
EXPTIME-PRE: 7.3 SEC
FASTING STATUS PATIENT QL REPORTED: YES
FEF2575-%PRED-PRE: 107 %
FEF2575-PRE: 4.74 L/SEC
FEF2575-PRED: 4.4 L/SEC
FEFMAX-%PRED-PRE: 116 %
FEFMAX-PRE: 11.32 L/SEC
FEFMAX-PRED: 9.74 L/SEC
FEV1-%PRED-PRE: 106 %
FEV1-PRE: 4.31 L
FEV1FEV6-PRE: 86 %
FEV1FEV6-PRED: 84 %
FEV1FVC-PRE: 85 %
FEV1FVC-PRED: 86 %
FIFMAX-PRE: 8.7 L/SEC
FVC-%PRED-PRE: 106 %
FVC-PRE: 5.06 L
FVC-PRED: 4.75 L
GGT SERPL-CCNC: 31 U/L (ref 8–61)
GLUCOSE BLDC GLUCOMTR-MCNC: 81 MG/DL (ref 70–99)
GLUCOSE SERPL-MCNC: 106 MG/DL (ref 70–99)
GLUCOSE SERPL-MCNC: 106 MG/DL (ref 70–99)
GLUCOSE SERPL-MCNC: 143 MG/DL (ref 70–99)
GLUCOSE SERPL-MCNC: 218 MG/DL (ref 70–99)
GLUCOSE SERPL-MCNC: 238 MG/DL (ref 70–99)
GLUCOSE SERPL-MCNC: 82 MG/DL (ref 70–99)
GLUCOSE UR STRIP-MCNC: NEGATIVE MG/DL
HBA1C MFR BLD: 5.2 %
HCO3 SERPL-SCNC: 22 MMOL/L (ref 22–29)
HCT VFR BLD AUTO: 40.7 % (ref 40–53)
HDLC SERPL-MCNC: 33 MG/DL
HGB BLD-MCNC: 14.6 G/DL (ref 13.3–17.7)
HGB UR QL STRIP: NEGATIVE
IGG SERPL-MCNC: 940 MG/DL (ref 610–1616)
IMM GRANULOCYTES # BLD: 0 10E3/UL
IMM GRANULOCYTES NFR BLD: 0 %
INR PPP: 1 (ref 0.85–1.15)
INSULIN SERPL-ACNC: 100 UU/ML (ref 2.6–24.9)
INSULIN SERPL-ACNC: 123 UU/ML (ref 2.6–24.9)
INSULIN SERPL-ACNC: 13.1 UU/ML (ref 2.6–24.9)
INSULIN SERPL-ACNC: 35.5 UU/ML (ref 2.6–24.9)
INSULIN SERPL-ACNC: 49.3 UU/ML (ref 2.6–24.9)
IRON SERPL-MCNC: 151 UG/DL (ref 61–157)
KETONES UR STRIP-MCNC: NEGATIVE MG/DL
LDLC SERPL CALC-MCNC: ABNORMAL MG/DL
LEUKOCYTE ESTERASE UR QL STRIP: NEGATIVE
LYMPHOCYTES # BLD AUTO: 2.4 10E3/UL (ref 0.8–5.3)
LYMPHOCYTES NFR BLD AUTO: 46 %
MAGNESIUM SERPL-MCNC: 1.8 MG/DL (ref 1.7–2.3)
MCH RBC QN AUTO: 32.9 PG (ref 26.5–33)
MCHC RBC AUTO-ENTMCNC: 35.9 G/DL (ref 31.5–36.5)
MCV RBC AUTO: 92 FL (ref 78–100)
MICROALBUMIN UR-MCNC: <12 MG/L
MICROALBUMIN/CREAT UR: NORMAL MG/G{CREAT}
MONOCYTES # BLD AUTO: 0.4 10E3/UL (ref 0–1.3)
MONOCYTES NFR BLD AUTO: 8 %
MUCOUS THREADS #/AREA URNS LPF: PRESENT /LPF
NEUTROPHILS # BLD AUTO: 2.2 10E3/UL (ref 1.6–8.3)
NEUTROPHILS NFR BLD AUTO: 42 %
NITRATE UR QL: NEGATIVE
NONHDLC SERPL-MCNC: 145 MG/DL
NRBC # BLD AUTO: 0 10E3/UL
NRBC BLD AUTO-RTO: 0 /100
PH UR STRIP: 6 [PH] (ref 5–7)
PHOSPHATE SERPL-MCNC: 2.5 MG/DL (ref 2.5–4.5)
PLATELET # BLD AUTO: 367 10E3/UL (ref 150–450)
POTASSIUM SERPL-SCNC: 3.9 MMOL/L (ref 3.4–5.3)
PROT SERPL-MCNC: 7.1 G/DL (ref 6.4–8.3)
RBC # BLD AUTO: 4.44 10E6/UL (ref 4.4–5.9)
RBC URINE: 1 /HPF
SODIUM SERPL-SCNC: 139 MMOL/L (ref 135–145)
SP GR UR STRIP: 1.01 (ref 1–1.03)
T4 FREE SERPL-MCNC: 1.14 NG/DL (ref 0.9–1.7)
TRIGL SERPL-MCNC: 571 MG/DL
TSH SERPL DL<=0.005 MIU/L-ACNC: 4.87 UIU/ML (ref 0.3–4.2)
UROBILINOGEN UR STRIP-MCNC: NORMAL MG/DL
VIT D+METAB SERPL-MCNC: 22 NG/ML (ref 20–50)
WBC # BLD AUTO: 5.3 10E3/UL (ref 4–11)
WBC URINE: <1 /HPF

## 2024-08-27 PROCEDURE — 84439 ASSAY OF FREE THYROXINE: CPT

## 2024-08-27 PROCEDURE — 77080 DXA BONE DENSITY AXIAL: CPT | Performed by: INTERNAL MEDICINE

## 2024-08-27 PROCEDURE — 80053 COMPREHEN METABOLIC PANEL: CPT

## 2024-08-27 PROCEDURE — 36415 COLL VENOUS BLD VENIPUNCTURE: CPT | Performed by: PHYSICIAN ASSISTANT

## 2024-08-27 PROCEDURE — 82785 ASSAY OF IGE: CPT

## 2024-08-27 PROCEDURE — 85610 PROTHROMBIN TIME: CPT

## 2024-08-27 PROCEDURE — 82962 GLUCOSE BLOOD TEST: CPT

## 2024-08-27 PROCEDURE — 99213 OFFICE O/P EST LOW 20 MIN: CPT | Performed by: PHYSICIAN ASSISTANT

## 2024-08-27 PROCEDURE — 82977 ASSAY OF GGT: CPT

## 2024-08-27 PROCEDURE — 84403 ASSAY OF TOTAL TESTOSTERONE: CPT

## 2024-08-27 PROCEDURE — 84478 ASSAY OF TRIGLYCERIDES: CPT

## 2024-08-27 PROCEDURE — 250N000009 HC RX 250: Performed by: PHYSICIAN ASSISTANT

## 2024-08-27 PROCEDURE — 83540 ASSAY OF IRON: CPT

## 2024-08-27 PROCEDURE — 71046 X-RAY EXAM CHEST 2 VIEWS: CPT | Mod: GC | Performed by: RADIOLOGY

## 2024-08-27 PROCEDURE — 82947 ASSAY GLUCOSE BLOOD QUANT: CPT | Performed by: PHYSICIAN ASSISTANT

## 2024-08-27 PROCEDURE — 97803 MED NUTRITION INDIV SUBSEQ: CPT | Mod: 59 | Performed by: DIETITIAN, REGISTERED

## 2024-08-27 PROCEDURE — 82248 BILIRUBIN DIRECT: CPT

## 2024-08-27 PROCEDURE — 82306 VITAMIN D 25 HYDROXY: CPT

## 2024-08-27 PROCEDURE — 84590 ASSAY OF VITAMIN A: CPT

## 2024-08-27 PROCEDURE — 82784 ASSAY IGA/IGD/IGG/IGM EACH: CPT

## 2024-08-27 PROCEDURE — 84443 ASSAY THYROID STIM HORMONE: CPT

## 2024-08-27 PROCEDURE — 85025 COMPLETE CBC W/AUTO DIFF WBC: CPT

## 2024-08-27 PROCEDURE — 84100 ASSAY OF PHOSPHORUS: CPT

## 2024-08-27 PROCEDURE — 83525 ASSAY OF INSULIN: CPT | Performed by: PHYSICIAN ASSISTANT

## 2024-08-27 PROCEDURE — 94375 RESPIRATORY FLOW VOLUME LOOP: CPT | Performed by: PHYSICIAN ASSISTANT

## 2024-08-27 PROCEDURE — 81001 URINALYSIS AUTO W/SCOPE: CPT

## 2024-08-27 PROCEDURE — 82570 ASSAY OF URINE CREATININE: CPT

## 2024-08-27 PROCEDURE — 99207 PR NO CHARGE LOS: CPT | Performed by: PHARMACIST

## 2024-08-27 PROCEDURE — 83735 ASSAY OF MAGNESIUM: CPT

## 2024-08-27 PROCEDURE — 82550 ASSAY OF CK (CPK): CPT

## 2024-08-27 PROCEDURE — 99214 OFFICE O/P EST MOD 30 MIN: CPT | Mod: 25 | Performed by: PHYSICIAN ASSISTANT

## 2024-08-27 PROCEDURE — 87799 DETECT AGENT NOS DNA QUANT: CPT

## 2024-08-27 PROCEDURE — 85652 RBC SED RATE AUTOMATED: CPT

## 2024-08-27 PROCEDURE — 36415 COLL VENOUS BLD VENIPUNCTURE: CPT

## 2024-08-27 PROCEDURE — 83036 HEMOGLOBIN GLYCOSYLATED A1C: CPT

## 2024-08-27 PROCEDURE — 84446 ASSAY OF VITAMIN E: CPT

## 2024-08-27 PROCEDURE — 87070 CULTURE OTHR SPECIMN AEROBIC: CPT | Performed by: PHYSICIAN ASSISTANT

## 2024-08-27 RX ORDER — PEDIATRIC MULTIVIT 61/D3/VIT K 1500-800
1 CAPSULE ORAL 2 TIMES DAILY
Qty: 60 CAPSULE | Refills: 11 | Status: SHIPPED | OUTPATIENT
Start: 2024-08-27

## 2024-08-27 RX ORDER — ELEXACAFTOR, TEZACAFTOR, AND IVACAFTOR 100-50-75
KIT ORAL
Qty: 84 TABLET | Refills: 5 | Status: SHIPPED | OUTPATIENT
Start: 2024-08-27

## 2024-08-27 RX ADMIN — ALCOHOL 75 G: 65 GEL TOPICAL at 07:15

## 2024-08-27 ASSESSMENT — PAIN SCALES - GENERAL: PAINLEVEL: NO PAIN (0)

## 2024-08-27 NOTE — PROGRESS NOTES
CF Annual Nutrition Assessment    Reason for Assessment  Assessed during clinic visit with Nita Cedeño r/t increased nutrition risk with diagnosis of CF per protocol.    Nutrition Significant PMH  Mild Lung Disease - taking Trikafta  Pancreatic Insufficient  G-tube placed  -- fell out 2021 and maintaining weight  Reactive hypoglycemia with OGTT (since )    Anthropometric Assessment  Height: 172 cm  IBW based on BMI 22 for females and 23 for males per CF Foundation recs: 68.6 kg (151 lbs)  Today's Weight: 79.4 kg (actual weight) (175 lbs)  Body mass index is 26.84 kg/m .     Wt Readings from Last 5 Encounters:   24 79.4 kg (175 lb 0.7 oz)   24 79.4 kg (175 lb)   24 78.5 kg (173 lb 1 oz)   24 75.8 kg (167 lb)   23 73.4 kg (161 lb 13.1 oz)     Comments: Weight trending upward over the last few years.     Pancreatic Enzymes  New prescription for Pancreaze 21,000 - 5-7 caps with meals started in / lack of insurance coverage for Creon. Pt has not started this yet, says he is still using up old supply of Creon.     Brand:  Creon [id number]  Dosin with meals, 3 with snacks = 1962 units lipase/kg/meal  Estimated Daily Intake: 20-24 caps = 7847 units lipase/kg/day    Signs of Malabsorption:  No  Enzyme Program:  None    Diet History and Assessment  Diet Preferences/Allergies/Intolerances: High Kcal/Pro    Intake Recall/Comments: Fair/good appetite at baseline, reports typical intake BID meals. Skips breakfast but may have snacks or granola bars on the way to work with Trikafta. Overall consumes good variety including fruits, vegetables, and dairy foods/protein.     Diet Recall:  Breakfast- skips, may have snacks like granola bars  Lunch- pack leftovers to eat at work or 3-4 ham sandwiches + 2 cups fruit + granola bar  Dinner- Mom cooks, usually meat/potatoes  Snacks- PB/cheese crackers, granola bars, yogurt, peanuts    Calcium: adequate with 3+ cups of whole milk and  cheese/yogurt  Salt: adds to foods + Gatorade zero  Hydration: adequate, drinks lots of water/milk, occasionally sports drinks like Gatorade.   Supplements: No    Estimated Energy and Protein Needs  Estimation based on weight maintenance/gain with Mild lung disease and pancreatic insufficient.    BEE: 1700   6306-6415 kcals/day = 150-175% BEE   g protein/day = 1.2-1.5 g/kg    Laboratory Assessment  Annual study labs obtained today; vitamin levels not available during visit  Vitamin A- 0.7 wnl  Vitamin D- 22 low  Vitamin E- 18.4 high (correlates with elevated lipid panel)  Iron- 151 wnl  Lipid Panel- elevated; plan to repeat fasting labs. Consider referral if TGs remain elevated.     OGTT- (8/27/24) indeterminate; fasting 106, 1-hr 238, 2 hr 82  DEXA- 2024, lowest z-score 0.7    Current Vitamin/Mineral Prescription: MVW Complete  1 capsule    CF Related Diabetes Evaluation  Hx:  Keon asymptomatic with hypoglycemia noted on OGTT. Educated by CF RD on 5/8/19 for diet recs to prevent reactive hypoglycemia.   -- Seen by endocrine/Dr. Castro and CDE 2019. Given glucose meter and recommended to complete BG checks if symptomatic. Complete OGTT annually.  -- Educated again by CF RD 12/2021 regarding hypoglycemia. Pt reports he has plans to follow-up with endo.     Nutrition Diagnosis  Impaired nutrient utilization related to pancreatic insufficiency as evidenced by requires pancreatic enzyme replacement therapy, high kcal/pro diet, and vitamin/mineral supplementation in order to maintain BMI >23 kg/m2 and support overall health.     Interventions/Recommendations  1) Oral Intake/Weight: Discussed current nutrition status including review of weight trends and adequacy of total calorie intake. Continue to encourage good PO with balanced intake for weight maintenance.    2) Enzymes: No GI concerns noted today, however pt will need to switch from Zettaset to Viamericase d/t insurance. Encouraged pt to maintain increased  hydration with the change. Contact RD if s/sx malabsorption >2 weeks.   3) Vitamins: ongoing low Vit D. Plan to increase MVW to 2 caps daily.     GOALS:  1) Weight maintenance BMI >23 kg/m2  2) Take vitamins/enzymes as prescribed    FOLLOW-UP/MONITORING:  Visit patient within 12 month(s) for annual nutrition assessment.     Face to Face Time: 15 minutes    Willow Mendes RD, LD, CACFD  Cystic Fibrosis/Lung Transplant Dietitian  Available via [employer]

## 2024-08-27 NOTE — NURSING NOTE
Chief Complaint   Patient presents with    Other     OGTT    Blood Draw     Labs drawn via PIV access by lab RN       IV placement with blood draw by lab RN. Vitals taken and appointment arrived.    Gely Quevedo RN

## 2024-08-27 NOTE — LETTER
PHYSICIAN ORDERS      DATE & TIME ISSUED: 2024 11:30 AM  PATIENT NAME: Keon Conway   : 2000     [facility] MR# [if applicable]:   DIAGNOSIS:  Cystic Fibrosis E84.0    Referral to Allergist to test medication allergies.           Any questions please call: [phone]    Please fax these results to [phone].        Nita Cedeño PA-C

## 2024-08-27 NOTE — LETTER
8/27/2024      Keon Conway  [address]  [facility] [address]      Dear Colleague,    Thank you for referring your patient, Keon Conway, to the [facility] [place]. Please see a copy of my visit note below.    [facility]  August 27, 2024         Assessment and Plan:   Keon Conway is a 24 year old male with cystic fibrosis who is seen today in clinic for routine follow up.     1. CF lung disease: no new pulmonary complaints, feeling really well, no cough or dyspnea. [place] active on the farm. Sating 100% on room air. Doing airway clearance daily. PFTs today are excellent. Previous cultures have grown out MSSA, H. Influenzae and Ps A.   - Continue nebs and vesting daily  - Chronic azithromycin 500 mg three times/week  - Will give Allergy referral locally to assess medication allergies     2. CFTR modulation: on Trikafta and tolerating it well. Labs WNL.   - Continue Trikafa     3. Reactive hypoglycemia:   Fasting hyperglycemia:  Abnormal OGTT: AIC of 5.2 Fasting BS of 106, peaked > 200, but did not have a reactive low this time. Does have a glucometer at home, but hasn't checked BS in a few months.  - OGTT annually     4. Exocrine pancreatic insufficiency: no s/s of malabsorption. BMI > CFF goal. Vitamin D level low at 22. Willow, RD, to see today.  - Continue pancreatic enzymes and vitamin supplementation     5. Hypertriglyceridemia: elevated since 2005, mainly in the 200s. Up to 571 today.   - Will recheck labs in two months, if still elevated, will have the patient see Cardiology    RTC:  4 months  Annual: August 2025 (DEXA > August 2026)  Vaccinations: discussed flu and covid vaccine recommendations, typically declines    Nita Cedeño PA-C  Pulmonary, Allergy, Critical Care and Sleep Medicine        Interval History:     No recent illnesses, no allergy complaints today. No cough,  no shortness of breath or tightness. Nebs and vesting daily. No new GI compalints, stools are stable with no floaty or fatty stools.          Review of Systems:   Please see HPI. Otherwise, complete 10 point ROS negative.           Past Medical and Surgical History:     Past Medical History:   Diagnosis Date     CF (cystic fibrosis) (H) 11/30/2011     Chronic maxillary sinusitis 11/30/2011     Exocrine pancreatic insufficiency 11/30/2011     Past Surgical History:   Procedure Laterality Date     BRONCHOSCOPY (RIGID OR FLEXIBLE), DIAGNOSTIC N/A 10/2/2017    Procedure: COMBINED BRONCHOSCOPY (RIGID OR FLEXIBLE), LAVAGE;  Flexible Bronchoscopy With Lavage, PICC Line Placement ;  Surgeon: Darrel Dudley MD;  Location: [facility] OR     CATARACT IOL, RT/LT Left      EXAM UNDER ANESTHESIA EYE(S) Left 3/17/2015    Procedure: EXAM UNDER ANESTHESIA EYE(S);  Surgeon: Aamir Taylor MD;  Location: [facility] OR     HERNIA REPAIR  December 2014     INSERT PICC LINE Right 10/2/2017    Procedure: INSERT PICC LINE;;  Surgeon: Angeles Singh MD;  [facility] OR     LAPAROSCOPIC ASSISTED INSERTION TUBE GASTROTOMY N/A 10/16/2017    Procedure: LAPAROSCOPIC ASSISTED INSERTION TUBE GASTROSTOMY;  Laparoscopic Gastrostomy Tube Placement.;  Surgeon: Jeremy Obando MD;  Location[facility] OR     SCRUB ETHYLENEDIAMENETETRAACETIC (EDTA) Left 3/17/2015    Procedure: SCRUB ETHYLENEDIAMENETETRAACETIC (EDTA);  Surgeon: Aamir Taylor MD;  [facility] OR     SINUS SURGERY  3/2011           Family History:     Family History   Problem Relation Age of Onset     Diabetes Paternal Grandfather             Social History:     Social History     Socioeconomic History     Marital status: Single     Spouse name: Not on file     Number of children: Not on file     Years of education: Not on file     Highest education level: Not on file   Occupational History     Not on file   Tobacco Use     Smoking status: Never     Smokeless tobacco: Never   Substance  and Sexual Activity     Alcohol use: No     Drug use: No     Sexual activity: Not on file   Other Topics Concern     Parent/sibling w/ CABG, MI or angioplasty before 65F 55M? Not Asked   Social History Narrative    Mom is 35, Dad 39, both healthy.     Social Determinants of Health     Financial Resource Strain: Not on file   Food Insecurity: Not on file   Transportation Needs: Not on file   Physical Activity: Not on file   Stress: Not on file   Social Connections: Not on file   Interpersonal Safety: Not on file   Housing Stability: Not on file            Medications:     Current Outpatient Medications   Medication Sig Dispense Refill     elexacaftor-tezacaftor-ivacaftor & ivacaftor (TRIKAFTA) 100-50-75 & 150 MG tablet pack TAKE 2 ORANGE TABLETS BY  MOUTH IN THE MORNING AND 1  BLUE TABLET IN THE EVENING  WITH FAT-CONTAINING FOOD  (12 HOURS APART). 8/14: please attend appt with labs on 8/27 for additional refills 84 tablet 5     albuterol (VENTOLIN HFA) 108 (90 Base) MCG/ACT inhaler Inhale 2 puffs into the lungs every 4 hours as needed. 8.5 g 3     azithromycin (ZITHROMAX) 500 MG tablet Take 1 tablet (500 mg) by mouth three times a week 12 tablet 11     blood glucose (ACCU-CHEK GUIDE) test strip Use to test blood sugar 1 times daily or as directed. 50 strip 11     blood glucose monitoring (ACCU-CHEK FASTCLIX) lancets Use to test blood sugar 1 times daily or as directed. 102 each 3     dornase gloria (PULMOZYME) 2.5 MG/2.5ML neb solution Inhale 2.5 mg into the lungs 2 times daily 450 mL 3     ipratropium - albuterol 0.5 mg/2.5 mg/3 mL (DUONEB) 0.5-2.5 (3) MG/3ML neb solution INHALE THE CONTENTS OF ONE VIAL INTO THE LUNGS BY NEBULIZATION THREE TIMES DAILY 360 mL 3     lipase-protease-amylase (CREON) 58938-69149-080908 units CPEP per EC capsule TAKE 6 CAPSULES BY MOUTH WITH MEALS AND 3 CAPSULES WITH SNACKS *3 MEALS/3 SNACKS PER  capsule 11     lipase-protease-amylase (PANCREAZE) 95914-13647-38309 units CPEP Take 5-7  "capsules by mouth 3 times daily (with meals) And take 3 capsules with meals. Total 3 meals and 3 snacks daily for maximum 30 capsules per day. 900 capsule 11     mvw complete formulation (SOFTGELS ) capsule Take 1 capsule by mouth daily 60 capsule 11     No current facility-administered medications for this visit.            Physical Exam:   /82   Pulse 89   Ht 1.72 m (5' 7.72\")   Wt 79.4 kg (175 lb 0.7 oz)   SpO2 100%   BMI 26.84 kg/m      GENERAL: alert, NAD  HEENT: NCAT, EOMI, anicteric sclera, no oral mucosal edema or erythema  Neck: no cervical or supraclavicular adenopathy  Respiratory: good airflow, mainly clear  CV: RRR, S1S2, no murmurs noted  Abdomen: normoactive BS, soft   Lymph: no edema  Neuro: AAO X 3, CN 2-12 grossly intact  Psychiatric: normal affect, good eye contact  Skin: no rash, jaundice or lesions on limited exam         Data:   All laboratory and imaging data reviewed.      Cystic Fibrosis Culture  Specimen Description   Date Value Ref Range Status   04/05/2021 Throat  Final   12/07/2020 Throat  Final   02/03/2020 Throat  Final    Culture Micro   Date Value Ref Range Status   04/05/2021 Moderate growth  Normal branden    Final   12/07/2020 Moderate growth  Normal branden    Final   02/03/2020 Canceled, Test credited  Incorrect specimen type    Final   02/03/2020   Final    Notification of test cancellation was given to  Mindy in [facility]. They do not want a yeast culture instead. 2.3.20 at 1109 bw          PFT interpretation:  Maneuver: valid and meets ATS guidelines  Normal spirometry        CF Annual Nutrition Visit    Recs:  1) Vit D low- increase MVW Complete  to 2 caps daily  2) Start Pancreaze - maintain hydration, contact RD if s/sx malabsorption >2 weeks    Face to Face Time: 15 minutes    Willow Mendes RD, MARIA DE JESUS, CACFD  Cystic Fibrosis/Lung Transplant Dietitian  Available via mySkin           Again, thank you for allowing me to participate in the care of your patient.  "       Sincerely,        Nita Cedeño PA-C

## 2024-08-27 NOTE — PROGRESS NOTES
Keon Conway comes into clinic today at the request of Nita CABALLERO  Ordering Provider for spirometry       Jarvis Rudolph, RRT

## 2024-08-27 NOTE — NURSING NOTE
RN sent order for repeat fasting lipid panel in 2 months as well as local allergist referral to [facility] per provider request.    YUE Snider

## 2024-08-27 NOTE — PATIENT INSTRUCTIONS
Cystic Fibrosis Self-Care Plan       Patient: Keon Conway   MRN: [id number]   Clinic Date: August 27, 2024     RECOMMENDATIONS:  1. Continue nebulizers and vest therapy.   2. Please recheck fasting labs (triglycerides) on two months.  3. Allergy referral to discuss your medication allergies locally.  4. Recommend both the covid and flu vaccine early October.   5. Vitamin D level low - increased MVW Complete  to twice daily.     Annual Studies:   IGG   Date Value Ref Range Status   12/07/2020 979 610 - 1,616 mg/dL Final     Immunoglobulin G   Date Value Ref Range Status   12/07/2021 999 610-1,616 mg/dL Final     Insulin   Date Value Ref Range Status   08/27/2024 123.0 (H) 2.6 - 24.9 uU/mL Final   12/07/2021 13.8 3.0 - 25.0 mU/L Final   05/08/2019 5.7 3 - 25 mU/L Final     There are no preventive care reminders to display for this patient.    Pulmonary Function Tests  FEV1: amount of air you can blow out in 1 second  FVC: total amount of air you can take in and blow out    Your Goals:             Latest Ref Rng & Units 8/27/2024     9:50 AM   PFT   FVC L 5.06  P   FEV1 L 4.31  P   FVC% % 106  P   FEV1% % 106  P      P Preliminary result          Airway Clearance: The Most Important Way to Keep Your Lungs Healthy  Vest Settings:   Hill-Rom Frequencies: 8, 9, 10 Pressure 10 Then, Frequencies 18, 19, 20 Pressure 6     RespirTech: Quick Start with Pressure of     Do each frequency for 5 minutes; Deflate vest after each frequency & cough 3 times before beginning the next setting.    Vest and Neb Therapy should be done 1 times/day.    Good Nutrition Can Improve Lung Function and Overall Health    Take ALL of your vitamins with food    Take 1/2 of your enzymes before EVERY meal/snack and the other 1/2 mid-meal/snack    Wt Readings from Last 3 Encounters:   08/27/24 79.4 kg (175 lb 0.7 oz)   08/27/24 79.4 kg (175 lb)   04/16/24 78.5 kg (173 lb 1 oz)       Body mass index is 26.84 kg/m .         National CF  Foundation Recommendations for BMI in CF Adults: Women: at least 22 Men: at least 23        Controlling Blood Sugars Helps Prevent Lung Infections & Improves Nutrition  Test blood sugar:    In the morning before eating (goal is )    2 hours after a meal (goal is less than 150)    When pre-meal glucose is greater than 150 add correction    At bedtime (if less than 100 eat a snack with 15 grams of carbohydrates  Last A1C Results:   Hemoglobin A1C   Date Value Ref Range Status   08/27/2024 5.2 <5.7 % Final     Comment:     Normal <5.7%   Prediabetes 5.7-6.4%    Diabetes 6.5% or higher     Note: Adopted from ADA consensus guidelines.   12/07/2020 5.1 0 - 5.6 % Final     Comment:     Normal <5.7% Prediabetes 5.7-6.4%  Diabetes 6.5% or higher - adopted from ADA   consensus guidelines.           If diabetic, measure A1C every 6 months. Goal is under 7%.    Staying Healthy  Research: If you are interested in learning about research opportunities or have questions, please contact Lily Daniels at [phone] or [email or web address].    CF Foundation: Compass is a personalized resource service to help you with the insurance, financial, legal and other issues you are facing.  It's free, confidential and available to anyone with CF.  Ask your  for more information or contact Compass directly at 669-[facility] ([phone]) or compass@cff.org, or learn more at cff.org/compass.       CF Nurse Line: Agatha Montiel and KJ: [phone]  Kanchan Licona or Vani Murphy RT: [phone]    Kaye Mendes , Dieticians: [phone]    Mindy Pedro, Diabetes Nurse: [phone]   Yenny Yancey: [phone] or Lakisha Mayers at [phone], Social Workers  [email or web address]

## 2024-08-27 NOTE — LETTER
PHYSICIAN ORDERS      DATE & TIME ISSUED: 2024 11:28 AM  PATIENT NAME: Keon Conway   : 2000     [facility] MR# [if applicable]:   DIAGNOSIS:  Cystic Fibrosis E84.0    Please draw fasting lipid panel in two months.      Any questions please call: [phone]    Please fax these results to [phone].        Nita Cedeño PA-C

## 2024-08-27 NOTE — NURSING NOTE
"Keon Conway is a 24 year old year old who is being seen for Cystic Fibrosis (F/u)      Medications reviewed and Vital signs taken.    Specimen Collection Type: Throat Swab    Order(s) placed: CF Aerobic Bacterial    *IF AFB order placed - please enter \"PRIORITIZE AFB\" to order comments.       No results found for: \"ACIDFAST\"      Lab Results   Component Value Date    AFBSMS Negative for acid fast bacteria 07/29/2019    AFBSMS  07/29/2019     A minimum of 5 mL of sputum or fluid is recommended for recovery of acid fast bacilli   (AFB).  Volumes less than 5 mL are suboptimal and may compromise recovery of AFB from   culture.      AFBSMS  07/29/2019     Assayed at [facility] [address] [place] [id number] [phone]           "

## 2024-08-27 NOTE — PROGRESS NOTES
"Adult Cystic Fibrosis Program  Annual Psychosocial Assessment    Presenting Information:  KEON is an 24-year-old male with cystic fibrosis, presenting in CF clinic for a follow up with CF provider, Nita Rivas.  Met with Keon for annual psychosocial assessment. His girlfriend, Miri, was present for the visit.     Living situation:  Keon lives with his parents, brother, and his girlfriend (Miri) in [place], [place].  His parents own the home. They have 2 dogs, a 1 yo bassett hound named Josue, and a 4 month old bassett hound named Jde. He does not currently pay rent. He denies any related concerns. Keon bought some land and hopes to start building a house with Miri in the next year.    Family Constellation:  Keon was raised by his biological parents. He has 1 sibling(s): an older brother. His extended family all lives close by. He is not aware of anyone else in his family having CF. He reports his family members are all doing well.      Social Support:  Keon reports good social support.  He gets along well with family members and draws additional support from his girlfriend of 8 years (Miri) and friends. He does not have any connections in the CF Community at this time.     Adjustment to Illness:  Keon was diagnosed with CF at age 18 months. He reports being somewhat sick often during his childhood with some hospitalizations.     Today he describes his current health status as \"good, the same\". He continues to take trikafta which is going well. Clinically, he has mild lung disease and pancreatic insufficiency. He typically does 1 vest treatment(s) per day. He does not formally exercise but notes being constantly on the move and doing heavy lifting at work. He denies any health problems that interfere with activities of daily living.     Keon is open about his CF diagnosis.      Health Care Directive:  Keon has previously received Health Care Directive education. This SW provided overview of " "education, including concept/purpose of health care directive, default health care agents and how to complete a directive. Keon is comfortable with his default health care agent(s) (parents) in absence of a directive. He is not currently interested in reviewing a health care directive.     Education:  Keon has completed high school. He does not currently have plans for further education. He is aware that scholarships are available if he changes his mind.     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 He denies related employment concerns at this time.     His girlfriend, Miri, works cleaning houses. Last year she was with him up at [employer] for the winter.     Finances:  Keon receives income from wages and also relies on some parental support. He and his parents share expenses and they pay his medical bills. He denies any financial concerns at this time.     Insurance:  Keon is insured through his mother's employer policy. He denies any related concerns at this time. We discussed what his options would be when he turns 26 and that he cannot remain on his mom's plan and he is over income for MA. Will discuss again as he approaches 26.    Mental Health/Coping:  Keon denies any current or past symptoms indicative of mood, anxiety, eating, learning or other mental health disorder. He reports his mood has been \"good\" and he denies any changes or concerns.    Keon was administered the following screens today in clinic:  RADHA-7: score of 0, indicating an absence of anxiety symptoms.   PHQ-9: score of 0, indicating an absence of depression symptoms.     He does not take any " "medications for mental health and does not see a therapist. Keon reports low/manageable stress levels. He was not able to identify any particular coping skills today.      Keon does not identify elizabeth/spirituality as important in his life and goes to [employer] \"off and on\".    Chemical Health:  Keon denies the use of alcohol, tobacco or other drugs.     Leisure Activities/Interests:   Keon enjoys going fishing, ice fishing, hanging out with friends/Miri, snowmobiling, and riding on his side by side.     Intervention:  -Psychosocial Assessment  -Insurance/financial counseling    Assessment:  Keon was friendly and receptive to SW. He appeared to be open but brief in his responses which is his baseline. He continues to have stable employment and finances. His job does not offer benefits so we discussed the options for insurance when he turns 26. His mental and physical health are stable. He appears to have adequate social support.    Keon seems to be psychosocially stable overall, with access to relevant resources and supports.  No concerns expressed/noted.    Plan:  Re-consult for any psychosocial needs that may arise.    Complete psychosocial assessment annually.  Continue to follow for regular clinic consult.    Lakisha Mayers [facility]  Adult Cystic Fibrosis   Ph: [phone]    Message me securely with Tim                "

## 2024-08-27 NOTE — PROGRESS NOTES
Keon Conway presents to [facility] for a glucose tolerance test.  During today's [facility] appointment orders from Nita Cedeño PA-C were completed.    Patient NPO: YES  CF annual labs completed YES  Patient drank 75 grams glucola at 0715 within 10 minutes.      Administrations This Visit       glucose tolerence test (GLUCOLA) 25% oral liquid 75 g       Admin Date  08/27/2024 Action  $Given Dose  75 g Route  Oral Documented By  Nery Lovelace, RN                  Labs drawn at baseline, +30, +60, +90 and +120 minutes post glucola.    Vascular access: peripheral IV was placed prior to appointment at [facility] in 2nd floor lab.    Pt tolerated glucose tolerance testwell    Post test blood sugar 81    Patient given snack YES    Patient discharged at 0920 with self to other appointments.    Nery Lovelace, RN

## 2024-08-27 NOTE — PROGRESS NOTES
Clinical Update:                                                    A chart review was conducted for Keon Conway.    Reason for Chart Review: Trikafta 6 Month Lab Monitoring     Discussion: Keon has been on Trikafta since 11/2019. However he reported going off Trikafta ~12/2021 before having a decline in lung function and restarting 3/2022. Per chart review Keon is taking full dose Trikafta at this time.     Labs were reviewed from 8/27/2024 at [facility] . All modulator labs are within normal limits.    Lab Results   Component Value Date    ALT 30 08/27/2024    AST 30 08/27/2024    BILITOTAL 0.3 08/27/2024    DBIL <0.20 08/27/2024     08/27/2024       Plan:  1. Continue Trikafta   2. Recheck hepatic panel and CK in 6 months         Liss Croft, PharmD   Cystic Fibrosis MTM Pharmacist  [facility]

## 2024-08-28 LAB — IGE SERPL-ACNC: 38 KU/L (ref 0–114)

## 2024-08-29 LAB — TESTOST SERPL-MCNC: 321 NG/DL (ref 240–950)

## 2024-08-30 LAB
A-TOCOPHEROL VIT E SERPL-MCNC: 18.4 MG/L
ANNOTATION COMMENT IMP: NORMAL
BETA+GAMMA TOCOPHEROL SERPL-MCNC: 1.3 MG/L
RETINYL PALMITATE SERPL-MCNC: 0.06 MG/L
VIT A SERPL-MCNC: 0.7 MG/L

## 2024-09-01 LAB — BACTERIA SPEC CULT: NORMAL

## 2024-10-23 ENCOUNTER — TELEPHONE (OUTPATIENT)
Dept: PULMONOLOGY | Facility: CLINIC | Age: 24
End: 2024-10-23
Payer: COMMERCIAL

## 2024-10-23 DIAGNOSIS — E78.1 HYPERTRIGLYCERIDEMIA: Primary | ICD-10-CM

## 2024-10-23 DIAGNOSIS — E84.9 CF (CYSTIC FIBROSIS) (H): ICD-10-CM

## 2024-10-23 NOTE — TELEPHONE ENCOUNTER
"Patient returned call to triage line after message left by YUE Cordon.    This RN reviewed the recommendations from Dr. Hughes, that were also sent in a [email or web address] message-    \"Nita Cedeño is currently out of the office, Dr. Hughes  reviewed your lab work.     With your elevated triglycerides, Dr. Hughes would like to be pro-active and have you seen by Cardiology.     I just the placed the referral. Dr. Hughes is recommending for you to be seen by Amado Sanchez in Cardiology for hypertriglyceridemia. The Cardiology team will reach out to you to schedule. However you are always welcome to call their nmxd-[phone].\"    Patient verbalized understanding, all questions  answered.    RESHMA CagleN, RN  RN Care Coordinator [facility]    "

## 2024-10-23 NOTE — TELEPHONE ENCOUNTER
Referral placed to Cardiology per recommendations from Dr. Hughes, noted below.    [employer] message sent to patient.      ----- Message from Keon Hughes sent at 10/23/2024  9:30 AM CDT -----  Regarding: Lipid battery Can we refer Keon Conway to preventative cardiology (Amado Sanchez) for hypertriglyceridemia?  Keon Bazzi, BSN, RN  RN Care Coordinator [facility]

## 2024-10-28 ENCOUNTER — MYC REFILL (OUTPATIENT)
Dept: PULMONOLOGY | Facility: CLINIC | Age: 24
End: 2024-10-28
Payer: COMMERCIAL

## 2024-10-28 DIAGNOSIS — E84.9 CF (CYSTIC FIBROSIS) (H): ICD-10-CM

## 2024-10-28 RX ORDER — AZITHROMYCIN 500 MG/1
500 TABLET, FILM COATED ORAL
Qty: 36 TABLET | Refills: 3 | Status: SHIPPED | OUTPATIENT
Start: 2024-10-28

## 2024-10-29 NOTE — PHARMACY-ADMISSION MEDICATION HISTORY
Admission medication history interview status for the 10/2/2017 admission is complete. See Epic admission navigator for allergy information, pharmacy, prior to admission medications and immunization status.     Medication history interview sources:  mother    Changes made to PTA medication list (reason)  Added: none  Deleted: none  Changed:   - Duoneb 0.5-2.5 mg/ 3 ml: take 1 vial by nebulization 4 times daily -> three times daily.  - Sodium chloride inhalant 7% neb solution: take 4 ml by nebulization 4 times daily as needed -> every 8 hours as needed.    Additional medication history information (including reliability of information, actions taken by pharmacist):  - Medication history was reviewed with patient's mother who managed his medications at home. Mother was able to recall all of patient's medications and last doses PTA.  - Mother reported patient rarely used Miralax at home.   - Patient has not had a flu shot for this season; he is not interested in getting one in the hospital.      Prior to Admission medications    Medication Sig Last Dose Taking? Auth Provider   lumacaftor-ivacaftor (ORKAMBI) 200-125 MG tablet Take 2 tablets by mouth every 12 hours with fat-containing food. 10/1/2017 at pm Yes Nallely Dior APRN CNP   amylase-lipase-protease (CREON) 66417 UNITS CPEP per EC capsule Take 6 capsules with meals and 3 capsules with snacks 10/1/2017 at Unknown time Yes Kerri William MD   multivitamin CF formula (MVW COMPLETE FORMULATION ) softgel cap Take 1 capsule by mouth daily 10/1/2017 at Unknown time Yes Kerri William MD   albuterol (VENTOLIN HFA) 108 (90 BASE) MCG/ACT Inhaler Inhale 2 puffs into the lungs every 4 hours as needed.  (Prior to vest therapy at school.) 10/1/2017 at pm Yes Nallely Dior APRN CNP   azithromycin (ZITHROMAX) 500 MG tablet Take 1 tablet (500 mg) by mouth Every Mon, Wed, Fri Morning 9/29/2017 at unknown Yes Nallely Dior APRN CNP  Is This A New Presentation, Or A Follow-Up?: Skin Lesion   beclomethasone (QVAR) 80 MCG/ACT Inhaler Inhale 2 puffs into the lungs 2 times daily 10/2/2017 at 0530 Yes Nallely Dior APRN CNP   dornase alpha (PULMOZYME) 1 MG/ML neb solution Inhale 2.5 mg into the lungs 2 times daily 10/2/2017 at Unknown time Yes Nallely Dior APRN CNP   ipratropium - albuterol 0.5 mg/2.5 mg/3 mL (DUONEB) 0.5-2.5 (3) MG/3ML neb solution Take 1 vial (3 mLs) by nebulization 4 times daily  Patient taking differently: Take 1 vial by nebulization 3 times daily  10/2/2017 at 0530 Yes Nallely Dior APRN CNP   misoprostol (CYTOTEC) 100 MCG tablet Take 1 tablet (100 mcg) by mouth 3 times daily 10/1/2017 at pm Yes Nallely Dior APRN CNP   sodium chloride inhalant (HYPER-SAL) 7 % NEBU neb solution Take 4 mLs by nebulization 4 times daily as needed (with illness)  Patient taking differently: Take 4 mLs by nebulization every 8 hours as needed (with illness)  10/2/2017 at 0500 Yes Nallely Dior APRN CNP   polyethylene glycol (MIRALAX) powder Take 17 g (1 capful) by mouth daily as needed More than a month at Unknown time  Nallely Dior APRN CNP   albuterol (2.5 MG/3ML) 0.083% neb solution Take 1 vial (2.5 mg) by nebulization every 4 hours as needed for shortness of breath / dyspnea or wheezing More than a month at Unknown time  Nallely Dior APRN CNP         Medication history completed by: Virgie Bell (Huong), Student Pharmacist on 10/03/17 at 15:14.       What Type Of Note Output Would You Prefer (Optional)?: Standard Output

## 2024-11-18 ENCOUNTER — OFFICE VISIT (OUTPATIENT)
Dept: PULMONOLOGY | Facility: CLINIC | Age: 24
End: 2024-11-18
Attending: PHYSICIAN ASSISTANT
Payer: COMMERCIAL

## 2024-11-18 VITALS
SYSTOLIC BLOOD PRESSURE: 124 MMHG | WEIGHT: 175 LBS | HEART RATE: 98 BPM | DIASTOLIC BLOOD PRESSURE: 83 MMHG | BODY MASS INDEX: 26.52 KG/M2 | OXYGEN SATURATION: 99 % | HEIGHT: 68 IN

## 2024-11-18 DIAGNOSIS — E84.9 CF (CYSTIC FIBROSIS) (H): Primary | ICD-10-CM

## 2024-11-18 DIAGNOSIS — E78.1 HYPERTRIGLYCERIDEMIA: ICD-10-CM

## 2024-11-18 LAB
EXPTIME-PRE: 7.05 SEC
FEF2575-%PRED-PRE: 109 %
FEF2575-PRE: 4.8 L/SEC
FEF2575-PRED: 4.39 L/SEC
FEFMAX-%PRED-PRE: 111 %
FEFMAX-PRE: 10.83 L/SEC
FEFMAX-PRED: 9.75 L/SEC
FEV1-%PRED-PRE: 102 %
FEV1-PRE: 4.14 L
FEV1FEV6-PRE: 86 %
FEV1FEV6-PRED: 84 %
FEV1FVC-PRE: 86 %
FEV1FVC-PRED: 86 %
FIFMAX-PRE: 8.95 L/SEC
FVC-%PRED-PRE: 101 %
FVC-PRE: 4.82 L
FVC-PRED: 4.75 L

## 2024-11-18 PROCEDURE — 99214 OFFICE O/P EST MOD 30 MIN: CPT | Mod: 25 | Performed by: PHYSICIAN ASSISTANT

## 2024-11-18 PROCEDURE — G0008 ADMIN INFLUENZA VIRUS VAC: HCPCS | Performed by: PHYSICIAN ASSISTANT

## 2024-11-18 PROCEDURE — 250N000011 HC RX IP 250 OP 636: Performed by: PHYSICIAN ASSISTANT

## 2024-11-18 PROCEDURE — 90656 IIV3 VACC NO PRSV 0.5 ML IM: CPT | Performed by: PHYSICIAN ASSISTANT

## 2024-11-18 PROCEDURE — 87070 CULTURE OTHR SPECIMN AEROBIC: CPT | Performed by: PHYSICIAN ASSISTANT

## 2024-11-18 PROCEDURE — 99213 OFFICE O/P EST LOW 20 MIN: CPT | Performed by: PHYSICIAN ASSISTANT

## 2024-11-18 PROCEDURE — 94375 RESPIRATORY FLOW VOLUME LOOP: CPT | Performed by: PHYSICIAN ASSISTANT

## 2024-11-18 RX ADMIN — INFLUENZA A VIRUS A/VICTORIA/4897/2022 IVR-238 (H1N1) ANTIGEN (FORMALDEHYDE INACTIVATED), INFLUENZA A VIRUS A/CALIFORNIA/122/2022 SAN-022 (H3N2) ANTIGEN (FORMALDEHYDE INACTIVATED), AND INFLUENZA B VIRUS B/MICHIGAN/01/2021 ANTIGEN (FORMALDEHYDE INACTIVATED) 0.5 ML: 15; 15; 15 INJECTION, SUSPENSION INTRAMUSCULAR at 08:48

## 2024-11-18 ASSESSMENT — PAIN SCALES - GENERAL: PAINLEVEL_OUTOF10: NO PAIN (0)

## 2024-11-18 NOTE — PROGRESS NOTES
[facility]  November 18, 2024         Assessment and Plan:   Keon Conway is a 24 year old male with cystic fibrosis who is seen today in clinic for routine follow up.     1. CF lung disease: no new pulmonary complain and no recent illnesses. Sating 199% on room air. Doing airway clearance daily. PFTs today at his baseline, but below his best. Previous cultures have grown out MSSA, H. Influenzae and Ps A.   - Continue nebs and vesting daily  - Chronic azithromycin 500 mg three times/week  - Allergy referral for here (unable to do full medication testing at [facility])    2. CFTR modulation: on Trikafta and tolerating it well. Labs WNL.   - Hepatic panel and CK with next visit  - Continue Trikafa     3. Reactive hypoglycemia:   Fasting hyperglycemia:  Abnormal OGTT: AIC of 5.2 Fasting BS of 106, peaked > 200, but did not have a reactive low this time. Does have a glucometer at home, but hasn't checked BS in a few months.  - OGTT annually     4. Exocrine pancreatic insufficiency: no s/s of malabsorption. BMI > CFF goal.   - Vitamin D level with next visit  - Continue pancreatic enzymes and vitamin supplementation     5. Hypertriglyceridemia: elevated since 2005, mainly in the 200s. Remains elevated (> 500) on repeat fasting labs.  - Referral to Preventative Cardiology    RTC:  3-4 months with labs (will try and coordinate with Allergy and Cardiology)  Annual: August 2025 (DEXA > August 2026)  Vaccinations: flu shot today; declines covid vaccine    Nita Cedeño PA-C  Pulmonary, Allergy, Critical Care and Sleep Medicine        Interval History:     No recent illnesses, feeling well at work. Has been bow hunting for deer. No allergy complaints, no nasal congestion or drainage. Very minimal cough, No congestion or tightness. Doing nebs and vesting daily. No bloating or gas, stools are normal and sink.          Review of Systems:   Please see HPI. Otherwise,  complete 10 point ROS negative.           Past Medical and Surgical History:     Past Medical History:   Diagnosis Date    CF (cystic fibrosis) (H) 11/30/2011    Chronic maxillary sinusitis 11/30/2011    Exocrine pancreatic insufficiency 11/30/2011     Past Surgical History:   Procedure Laterality Date    BRONCHOSCOPY (RIGID OR FLEXIBLE), DIAGNOSTIC N/A 10/2/2017    Procedure: COMBINED BRONCHOSCOPY (RIGID OR FLEXIBLE), LAVAGE;  Flexible Bronchoscopy With Lavage, PICC Line Placement ;  Surgeon: Darrel Dudley MD;  [facility]    CATARACT IOL, RT/LT Left     EXAM UNDER ANESTHESIA EYE(S) Left 3/17/2015    Procedure: EXAM UNDER ANESTHESIA EYE(S);  Surgeon: Aamir Taylor MD;  [facility]    HERNIA REPAIR  December 2014    INSERT PICC LINE Right 10/2/2017    Procedure: INSERT PICC LINE;;  Surgeon: Angeles Singh MD;  [facility]    LAPAROSCOPIC ASSISTED INSERTION TUBE GASTROTOMY N/A 10/16/2017    Procedure: LAPAROSCOPIC ASSISTED INSERTION TUBE GASTROSTOMY;  Laparoscopic Gastrostomy Tube Placement.;  Surgeon: Jeremy Obando MD;  [facility]    SCRUB ETHYLENEDIAMENETETRAACETIC (EDTA) Left 3/17/2015    Procedure: SCRUB ETHYLENEDIAMENETETRAACETIC (EDTA);  Surgeon: Aamir Taylor MD;  [facility]    SINUS SURGERY  3/2011           Family History:     Family History   Problem Relation Age of Onset    Diabetes Paternal Grandfather             Social History:     Social History     Socioeconomic History    Marital status: Single     Spouse name: Not on file    Number of children: Not on file    Years of education: Not on file    Highest education level: Not on file   Occupational History    Not on file   Tobacco Use    Smoking status: Never    Smokeless tobacco: Never   Substance and Sexual Activity    Alcohol use: No    Drug use: No    Sexual activity: Not on file   Other Topics Concern    Parent/sibling w/ CABG, MI or angioplasty before 65F 55M? Not Asked   Social History Narrative     "Mom is 35, Dad 39, both healthy.     Social Drivers of Health     Financial Resource Strain: Not on file   Food Insecurity: Not on file   Transportation Needs: Not on file   Physical Activity: Not on file   Stress: Not on file   Social Connections: Not on file   Interpersonal Safety: Not on file   Housing Stability: Not on file            Medications:     Current Outpatient Medications   Medication Sig Dispense Refill    albuterol (VENTOLIN HFA) 108 (90 Base) MCG/ACT inhaler Inhale 2 puffs into the lungs every 4 hours as needed. 8.5 g 3    azithromycin (ZITHROMAX) 500 MG tablet Take 1 tablet (500 mg) by mouth three times a week. 36 tablet 3    blood glucose (ACCU-CHEK GUIDE) test strip Use to test blood sugar 1 times daily or as directed. 50 strip 11    blood glucose monitoring (ACCU-CHEK FASTCLIX) lancets Use to test blood sugar 1 times daily or as directed. 102 each 3    dornase gloria (PULMOZYME) 2.5 MG/2.5ML neb solution Inhale 2.5 mg into the lungs 2 times daily 450 mL 3    elexacaftor-tezacaftor-ivacaftor & ivacaftor (TRIKAFTA) 100-50-75 & 150 MG tablet pack TAKE 2 ORANGE TABLETS BY  MOUTH IN THE MORNING AND 1  BLUE TABLET IN THE EVENING  WITH FAT-CONTAINING FOOD  (12 HOURS APART). 8/14: please attend appt with labs on 8/27 for additional refills 84 tablet 5    ipratropium - albuterol 0.5 mg/2.5 mg/3 mL (DUONEB) 0.5-2.5 (3) MG/3ML neb solution INHALE THE CONTENTS OF ONE VIAL INTO THE LUNGS BY NEBULIZATION THREE TIMES DAILY 360 mL 3    lipase-protease-amylase (PANCREAZE) 05084-638[address]-46389 units CPEP Take 5-7 capsules by mouth 3 times daily (with meals) And take 3 capsules with meals. Total 3 meals and 3 snacks daily for maximum 30 capsules per day. 900 capsule 11    mvw complete formulation (SOFTGELS ) capsule Take 1 capsule by mouth 2 times daily. 60 capsule 11     No current facility-administered medications for this visit.            Physical Exam:   /83   Pulse 98   Ht 1.72 m (5' 7.72\")   Wt " 79.4 kg (175 lb)   SpO2 99%   BMI 26.83 kg/m      GENERAL: alert, NAD  HEENT: NCAT, EOMI, anicteric sclera, no oral mucosal edema or erythema  Neck: no cervical or supraclavicular adenopathy  Respiratory: good airflow, mainly clear  CV: RRR, S1S2, no murmurs noted  Abdomen: normoactive BS, soft   Lymph: no edema  Neuro: AAO X 3, CN 2-12 grossly intact  Psychiatric: normal affect, good eye contact  Skin: no rash, jaundice or lesions on limited exam         Data:   All laboratory and imaging data reviewed.      Cystic Fibrosis Culture  Specimen Description   Date Value Ref Range Status   04/05/2021 Throat  Final   12/07/2020 Throat  Final   02/03/2020 Throat  Final    Culture Micro   Date Value Ref Range Status   04/05/2021 Moderate growth  Normal branden    Final   12/07/2020 Moderate growth  Normal branden    Final   02/03/2020 Canceled, Test credited  Incorrect specimen type    Final   02/03/2020   Final    Notification of test cancellation was given to  Mindy in [facility]. They do not want a yeast culture instead. 2.3.20 at 1109 bw          PFT interpretation:  Maneuver: valid and meets ATS guidelines  Normal spirometry  Compared to prior: FEV1 of 4.14 is 170 ml below prior

## 2024-11-18 NOTE — LETTER
11/18/2024      Keon Conway  [address]  [facility] [address]      Dear Colleague,    Thank you for referring your patient, Keon Conway, to the [facility] [place]. Please see a copy of my visit note below.    [facility]  November 18, 2024         Assessment and Plan:   Keon Conway is a 24 year old male with cystic fibrosis who is seen today in clinic for routine follow up.     1. CF lung disease: no new pulmonary complain and no recent illnesses. Sating 199% on room air. Doing airway clearance daily. PFTs today at his baseline, but below his best. Previous cultures have grown out MSSA, H. Influenzae and Ps A.   - Continue nebs and vesting daily  - Chronic azithromycin 500 mg three times/week  - Allergy referral for here (unable to do full medication testing at [facility])    2. CFTR modulation: on Trikafta and tolerating it well. Labs WNL.   - Hepatic panel and CK with next visit  - Continue Trikafa     3. Reactive hypoglycemia:   Fasting hyperglycemia:  Abnormal OGTT: AIC of 5.2 Fasting BS of 106, peaked > 200, but did not have a reactive low this time. Does have a glucometer at home, but hasn't checked BS in a few months.  - OGTT annually     4. Exocrine pancreatic insufficiency: no s/s of malabsorption. BMI > CFF goal.   - Vitamin D level with next visit  - Continue pancreatic enzymes and vitamin supplementation     5. Hypertriglyceridemia: elevated since 2005, mainly in the 200s. Remains elevated (> 500) on repeat fasting labs.  - Referral to Preventative Cardiology    RTC:  3-4 months with labs (will try and coordinate with Allergy and Cardiology)  Annual: August 2025 (DEXA > August 2026)  Vaccinations: flu shot today; declines covid vaccine    Nita Cedeño PA-C  Pulmonary, Allergy, Critical Care and Sleep Medicine        Interval History:     No recent illnesses, feeling  well at work. Has been bow hunting for deer. No allergy complaints, no nasal congestion or drainage. Very minimal cough, No congestion or tightness. Doing nebs and vesting daily. No bloating or gas, stools are normal and sink.          Review of Systems:   Please see HPI. Otherwise, complete 10 point ROS negative.           Past Medical and Surgical History:     Past Medical History:   Diagnosis Date     CF (cystic fibrosis) (H) 11/30/2011     Chronic maxillary sinusitis 11/30/2011     Exocrine pancreatic insufficiency 11/30/2011     Past Surgical History:   Procedure Laterality Date     BRONCHOSCOPY (RIGID OR FLEXIBLE), DIAGNOSTIC N/A 10/2/2017    Procedure: COMBINED BRONCHOSCOPY (RIGID OR FLEXIBLE), LAVAGE;  Flexible Bronchoscopy With Lavage, PICC Line Placement ;  Surgeon: Darrel Dudley MD;  [facility]     CATARACT IOL, RT/LT Left      EXAM UNDER ANESTHESIA EYE(S) Left 3/17/2015    Procedure: EXAM UNDER ANESTHESIA EYE(S);  Surgeon: Aamir Taylor MD;  [facility]     HERNIA REPAIR  December 2014     INSERT PICC LINE Right 10/2/2017    Procedure: INSERT PICC LINE;;  Surgeon: Angeles Singh MD;  [facility]     LAPAROSCOPIC ASSISTED INSERTION TUBE GASTROTOMY N/A 10/16/2017    Procedure: LAPAROSCOPIC ASSISTED INSERTION TUBE GASTROSTOMY;  Laparoscopic Gastrostomy Tube Placement.;  Surgeon: Jeremy Obando MD;  [facility]     SCRUB ETHYLENEDIAMENETETRAACETIC (EDTA) Left 3/17/2015    Procedure: SCRUB ETHYLENEDIAMENETETRAACETIC (EDTA);  Surgeon: Aamir Taylor MD;  [facility]     SINUS SURGERY  3/2011           Family History:     Family History   Problem Relation Age of Onset     Diabetes Paternal Grandfather             Social History:     Social History     Socioeconomic History     Marital status: Single     Spouse name: Not on file     Number of children: Not on file     Years of education: Not on file     Highest education level: Not on file   Occupational History      Not on file   Tobacco Use     Smoking status: Never     Smokeless tobacco: Never   Substance and Sexual Activity     Alcohol use: No     Drug use: No     Sexual activity: Not on file   Other Topics Concern     Parent/sibling w/ CABG, MI or angioplasty before 65F 55M? Not Asked   Social History Narrative    Mom is 35, Dad 39, both healthy.     Social Drivers of Health     Financial Resource Strain: Not on file   Food Insecurity: Not on file   Transportation Needs: Not on file   Physical Activity: Not on file   Stress: Not on file   Social Connections: Not on file   Interpersonal Safety: Not on file   Housing Stability: Not on file            Medications:     Current Outpatient Medications   Medication Sig Dispense Refill     albuterol (VENTOLIN HFA) 108 (90 Base) MCG/ACT inhaler Inhale 2 puffs into the lungs every 4 hours as needed. 8.5 g 3     azithromycin (ZITHROMAX) 500 MG tablet Take 1 tablet (500 mg) by mouth three times a week. 36 tablet 3     blood glucose (ACCU-CHEK GUIDE) test strip Use to test blood sugar 1 times daily or as directed. 50 strip 11     blood glucose monitoring (ACCU-CHEK FASTCLIX) lancets Use to test blood sugar 1 times daily or as directed. 102 each 3     dornase gloria (PULMOZYME) 2.5 MG/2.5ML neb solution Inhale 2.5 mg into the lungs 2 times daily 450 mL 3     elexacaftor-tezacaftor-ivacaftor & ivacaftor (TRIKAFTA) 100-50-75 & 150 MG tablet pack TAKE 2 ORANGE TABLETS BY  MOUTH IN THE MORNING AND 1  BLUE TABLET IN THE EVENING  WITH FAT-CONTAINING FOOD  (12 HOURS APART). 8/14: please attend appt with labs on 8/27 for additional refills 84 tablet 5     ipratropium - albuterol 0.5 mg/2.5 mg/3 mL (DUONEB) 0.5-2.5 (3) MG/3ML neb solution INHALE THE CONTENTS OF ONE VIAL INTO THE LUNGS BY NEBULIZATION THREE TIMES DAILY 360 mL 3     lipase-protease-amylase (PANCREAZE) 65091-37871-47957 units CPEP Take 5-7 capsules by mouth 3 times daily (with meals) And take 3 capsules with meals. Total 3 meals and 3  "snacks daily for maximum 30 capsules per day. 900 capsule 11     mvw complete formulation (SOFTGELS ) capsule Take 1 capsule by mouth 2 times daily. 60 capsule 11     No current facility-administered medications for this visit.            Physical Exam:   /83   Pulse 98   Ht 1.72 m (5' 7.72\")   Wt 79.4 kg (175 lb)   SpO2 99%   BMI 26.83 kg/m      GENERAL: alert, NAD  HEENT: NCAT, EOMI, anicteric sclera, no oral mucosal edema or erythema  Neck: no cervical or supraclavicular adenopathy  Respiratory: good airflow, mainly clear  CV: RRR, S1S2, no murmurs noted  Abdomen: normoactive BS, soft   Lymph: no edema  Neuro: AAO X 3, CN 2-12 grossly intact  Psychiatric: normal affect, good eye contact  Skin: no rash, jaundice or lesions on limited exam         Data:   All laboratory and imaging data reviewed.      Cystic Fibrosis Culture  Specimen Description   Date Value Ref Range Status   04/05/2021 Throat  Final   12/07/2020 Throat  Final   02/03/2020 Throat  Final    Culture Micro   Date Value Ref Range Status   04/05/2021 Moderate growth  Normal branden    Final   12/07/2020 Moderate growth  Normal branden    Final   02/03/2020 Canceled, Test credited  Incorrect specimen type    Final   02/03/2020   Final    Notification of test cancellation was given to  Mindy in [facility]. They do not want a yeast culture instead. 2.3.20 at 1109 bw          PFT interpretation:  Maneuver: valid and meets ATS guidelines  Normal spirometry  Compared to prior: FEV1 of 4.14 is 170 ml below prior        Again, thank you for allowing me to participate in the care of your patient.        Sincerely,        Nita Cedeño PA-C  "

## 2024-11-18 NOTE — NURSING NOTE
Chief Complaint   Patient presents with    RECHECK     Return Cystic Fibrosis     Medications reviewed and vital signs taken.   Lucila Iniguez, CMA

## 2024-11-18 NOTE — PATIENT INSTRUCTIONS
Cystic Fibrosis Self-Care Plan       Patient: Keon Conway   MRN: [id number]   Clinic Date: November 18, 2024     RECOMMENDATIONS:  1. Continue nebulizers and vest therapy. Increase to 2-3 times/day if you are feeling like you have symptoms.   2. Stay warm!    Annual Studies:   IGG   Date Value Ref Range Status   12/07/2020 979 610 - 1,616 mg/dL Final     Immunoglobulin G   Date Value Ref Range Status   08/27/2024 940 610 - 1,616 mg/dL Final     Insulin   Date Value Ref Range Status   08/27/2024 35.5 (H) 2.6 - 24.9 uU/mL Final   12/07/2021 13.8 3.0 - 25.0 mU/L Final   05/08/2019 5.7 3 - 25 mU/L Final     There are no preventive care reminders to display for this patient.    Pulmonary Function Tests  FEV1: amount of air you can blow out in 1 second  FVC: total amount of air you can take in and blow out    Your Goals:             Latest Ref Rng & Units 11/18/2024     7:39 AM   PFT   FVC L 4.82  P   FEV1 L 4.14  P   FVC% % 101  P   FEV1% % 102  P      P Preliminary result          Airway Clearance: The Most Important Way to Keep Your Lungs Healthy  Vest Settings:   Hill-Rom Frequencies: 8, 9, 10 Pressure 10 Then, Frequencies 18, 19, 20 Pressure 6     RespirTech: Quick Start with Pressure of     Do each frequency for 5 minutes; Deflate vest after each frequency & cough 3 times before beginning the next setting.    Vest and Neb Therapy should be done 1-2 times/day.    Good Nutrition Can Improve Lung Function and Overall Health    Take ALL of your vitamins with food    Take 1/2 of your enzymes before EVERY meal/snack and the other 1/2 mid-meal/snack    Wt Readings from Last 3 Encounters:   11/18/24 79.4 kg (175 lb)   08/27/24 79.4 kg (175 lb 0.7 oz)   08/27/24 79.4 kg (175 lb)       Body mass index is 26.83 kg/m .         National CF Foundation Recommendations for BMI in CF Adults: Women: at least 22 Men: at least 23        Controlling Blood Sugars Helps Prevent Lung Infections & Improves Nutrition  Test blood  sugar:    In the morning before eating (goal is )    2 hours after a meal (goal is less than 150)    When pre-meal glucose is greater than 150 add correction    At bedtime (if less than 100 eat a snack with 15 grams of carbohydrates  Last A1C Results:   Hemoglobin A1C   Date Value Ref Range Status   08/27/2024 5.2 <5.7 % Final     Comment:     Normal <5.7%   Prediabetes 5.7-6.4%    Diabetes 6.5% or higher     Note: Adopted from ADA consensus guidelines.   12/07/2020 5.1 0 - 5.6 % Final     Comment:     Normal <5.7% Prediabetes 5.7-6.4%  Diabetes 6.5% or higher - adopted from ADA   consensus guidelines.           If diabetic, measure A1C every 6 months. Goal is under 7%.    Staying Healthy  Research: If you are interested in learning about research opportunities or have questions, please contact Lily Daniels at [phone] or [email or web address].     Foundation: Compass is a personalized resource service to help you with the insurance, financial, legal and other issues you are facing.  It's free, confidential and available to anyone with CF.  Ask your  for more information or contact Compass directly at 6[facility] ([phone]) or compass@cff.org, or learn more at cff.org/compass.       CF Nurse Line: Agatha Montiel and KJ: [phone]  Kanchan Licona or Vani Murphy RT: [phone]    Kaye Mendes , Dieticians: [phone]    Mindy Pedro, Diabetes Nurse: [phone]   Yenny Yancey: [phone] or Lakisha Mayers at [phone], Social Workers  [email or web address]

## 2024-11-21 LAB — BACTERIA SPEC CULT: NORMAL

## 2024-11-23 LAB — BACTERIA SPEC CULT: NORMAL

## 2024-12-31 DIAGNOSIS — E84.9 CF (CYSTIC FIBROSIS) (H): ICD-10-CM

## 2024-12-31 RX ORDER — IPRATROPIUM BROMIDE AND ALBUTEROL SULFATE 2.5; .5 MG/3ML; MG/3ML
SOLUTION RESPIRATORY (INHALATION)
Qty: 270 ML | Refills: 11 | Status: SHIPPED | OUTPATIENT
Start: 2024-12-31

## 2025-01-13 DIAGNOSIS — E84.9 CYSTIC FIBROSIS (H): ICD-10-CM

## 2025-01-13 RX ORDER — ELEXACAFTOR, TEZACAFTOR, AND IVACAFTOR 100-50-75
KIT ORAL
Qty: 84 TABLET | Refills: 4 | Status: SHIPPED | OUTPATIENT
Start: 2025-01-13

## 2025-02-28 ENCOUNTER — TELEPHONE (OUTPATIENT)
Dept: PULMONOLOGY | Facility: CLINIC | Age: 25
End: 2025-02-28
Payer: COMMERCIAL

## 2025-03-06 ENCOUNTER — TELEPHONE (OUTPATIENT)
Dept: PULMONOLOGY | Facility: CLINIC | Age: 25
End: 2025-03-06
Payer: COMMERCIAL

## 2025-03-06 NOTE — ORAL ONC MGMT
Prior Authorization Approval    Medication: PULMOZYME 2.5 MG/2.5ML IN SOLN  Authorization Effective Date: 2/4/2025  Authorization Expiration Date: 3/6/2026  Approved Dose/Quantity: 150ml for 30 ds   Reference #: Key: [id number]   Insurance Company: [employer] - Phone [phone] Fax [phone]  Expected CoPay: $    CoPay Card Available:      Financial Assistance Needed:   Which Pharmacy is filling the prescription:    Pharmacy Notified:   Patient Notified:     Refill too soon until 4/6/25 - will verify if [facility] can fill and offer

## 2025-03-06 NOTE — PROGRESS NOTES
[facility]  March 10, 2025         Assessment and Plan:   Keon Conway is a 24 year old male with cystic fibrosis who is seen today in clinic for routine follow up.     1. Mild exacerbation of CF lung disease: no recent illnesses, but has noticed an increase in frequency of cough since he ran out of morning Trikafta last Thurday. No new dyspnea. Sating 98% on room air. Doing airway clearance daily. PFTs today are significantly down from his last two visits. Previous cultures have grown out MSSA, H. Influenzae and Ps A (but not int he last two years).   - Continue nebs and vesting, increase to BID  - Start levaquin X 21 days; hold azithromycin   - Allergy referral for here (unable to do full medication testing at [place]) again today--discussed answering the phone    2. CFTR modulation: on Trikafta and tolerating it well. Labs today WNL.   - Resume full dose Trikafa     3. Reactive hypoglycemia:   Fasting hyperglycemia:  Abnormal OGTT: AIC of 5.2 Fasting BS of 106, peaked > 200, but did not have a reactive low this time. Does have a glucometer at home, but hasn't checked BS in a few months.  - OGTT annually     4. Exocrine pancreatic insufficiency: no s/s of malabsorption. BMI > CFF goal.   - Vitamin D level in process  - Continue pancreatic enzymes and vitamin supplementation     5. Hypertriglyceridemia: elevated since 2005, mainly in the 200s. Remains elevated (> 500) on repeat fasting labs.  - Referral to Preventative Cardiology again--discussed answering the phone    RTC: PFTs locally in 3 weeks; 3 months in person  Annual: August 2025 (DEXA > August 2026)  Vaccinations: flu shot today; declines covid vaccine    Nita Cedeño PA-C  Pulmonary, Allergy, Critical Care and Sleep Medicine        Interval History:     No recent illnesses, notes he's doing nebs and vesting daily. No fever or chills, no sinus or nasal drainage. Cough has increased the last  few days, more frequent, mainly dry. No congestion, no tightness or wheezing. No new shortness of breath, notes he'll be more active at that time. No bloating or gas, stools are normal and sink. Ran out of Trikafta last Thursday, but still has the night pills.          Review of Systems:   Please see HPI. Otherwise, complete 10 point ROS negative.           Past Medical and Surgical History:     Past Medical History:   Diagnosis Date    CF (cystic fibrosis) (H) 11/30/2011    Chronic maxillary sinusitis 11/30/2011    Exocrine pancreatic insufficiency 11/30/2011     Past Surgical History:   Procedure Laterality Date    BRONCHOSCOPY (RIGID OR FLEXIBLE), DIAGNOSTIC N/A 10/2/2017    Procedure: COMBINED BRONCHOSCOPY (RIGID OR FLEXIBLE), LAVAGE;  Flexible Bronchoscopy With Lavage, PICC Line Placement ;  Surgeon: Darrel Dudley MD;  Location: [facility] OR    CATARACT IOL, RT/LT Left     EXAM UNDER ANESTHESIA EYE(S) Left 3/17/2015    Procedure: EXAM UNDER ANESTHESIA EYE(S);  Surgeon: Aamir Taylor MD;  Location: [facility] OR    HERNIA REPAIR  December 2014    INSERT PICC LINE Right 10/2/2017    Procedure: INSERT PICC LINE;;  Surgeon: Angeles Singh MD;  Location: [facility] OR    LAPAROSCOPIC ASSISTED INSERTION TUBE GASTROTOMY N/A 10/16/2017    Procedure: LAPAROSCOPIC ASSISTED INSERTION TUBE GASTROSTOMY;  Laparoscopic Gastrostomy Tube Placement.;  Surgeon: Jeremy Obando MD;  Location: [facility] OR    SCRUB ETHYLENEDIAMENETETRAACETIC (EDTA) Left 3/17/2015    Procedure: SCRUB ETHYLENEDIAMENETETRAACETIC (EDTA);  Surgeon: Aamir Taylor MD;  Location: [facility] OR    SINUS SURGERY  3/2011           Family History:     Family History   Problem Relation Age of Onset    Diabetes Paternal Grandfather             Social History:     Social History     Socioeconomic History    Marital status: Single     Spouse name: Not on file    Number of children: Not on file    Years of education: Not on file    Highest education level: Not on file    Occupational History    Not on file   Tobacco Use    Smoking status: Never    Smokeless tobacco: Never   Substance and Sexual Activity    Alcohol use: No    Drug use: No    Sexual activity: Not on file   Other Topics Concern    Parent/sibling w/ CABG, MI or angioplasty before 65F 55M? Not Asked   Social History Narrative    Mom is 35, Dad 39, both healthy.     Social Drivers of Health     Financial Resource Strain: Not on file   Food Insecurity: No Food Insecurity (2/16/2025)    Received from [facility]    Hunger Vital Sign     Worried About Running Out of Food in the Last Year: Never true     Ran Out of Food in the Last Year: Never true   Transportation Needs: No Transportation Needs (2/16/2025)    Received from [facility]    PRAPARE - Transportation     Lack of Transportation (Medical): No     Lack of Transportation (Non-Medical): No   Physical Activity: Not on file   Stress: Not on file   Social Connections: Not on file   Interpersonal Safety: Not on file   Housing Stability: Low Risk  (2/16/2025)    Received from [facility]    Housing Stability Vital Sign     Unable to Pay for Housing in the Last Year: No     Number of Times Moved in the Last Year: 0     Homeless in the Last Year: No            Medications:     Current Outpatient Medications   Medication Sig Dispense Refill    levofloxacin (LEVAQUIN) 750 MG tablet Take 1 tablet (750 mg) by mouth daily for 21 days. 21 tablet 0    albuterol (VENTOLIN HFA) 108 (90 Base) MCG/ACT inhaler Inhale 2 puffs into the lungs every 4 hours as needed. 8.5 g 3    azithromycin (ZITHROMAX) 500 MG tablet Take 1 tablet (500 mg) by mouth three times a week. 36 tablet 3    blood glucose (ACCU-CHEK GUIDE) test strip Use to test blood sugar 1 times daily or as directed. 50 strip 11    blood glucose monitoring (ACCU-CHEK FASTCLIX) lancets Use to test blood sugar 1 times daily or  "as directed. 102 each 3    dornase gloria (PULMOZYME) 2.5 MG/2.5ML neb solution Inhale 2.5 mg into the lungs 2 times daily 450 mL 3    elexacaftor-tezacaftor-ivacaftor & ivacaftor (TRIKAFTA) 100-50-75 & 150 MG tablet pack TAKE 2 ORANGE TABLETS BY  MOUTH IN THE MORNING AND 1  BLUE TABLET IN THE EVENING  WITH FAT-CONTAINING FOOD  (12 HOURS APART). 84 tablet 4    ipratropium - albuterol 0.5 mg/2.5 mg/3 mL (DUONEB) 0.5-2.5 (3) MG/3ML neb solution INHALE THE CONTENTS OF ONE VIAL INTO THE LUNGS BY NEBULIZATION THREE TIMES DAILY 270 mL 11    lipase-protease-amylase (PANCREAZE) 30080-18409-94663 units CPEP Take 5-7 capsules by mouth 3 times daily (with meals) And take 3 capsules with meals. Total 3 meals and 3 snacks daily for maximum 30 capsules per day. 900 capsule 11    mvw complete formulation (SOFTGELS ) capsule Take 1 capsule by mouth 2 times daily. 60 capsule 11     No current facility-administered medications for this visit.            Physical Exam:   /88 (BP Location: Right arm, Patient Position: Chair, Cuff Size: Adult Regular)   Pulse 100   Ht 1.732 m (5' 8.19\")   Wt 80.3 kg (177 lb 0.5 oz)   SpO2 98%   BMI 26.77 kg/m      GENERAL: alert, NAD  HEENT: NCAT, EOMI, anicteric sclera, no oral mucosal edema or erythema  Neck: no cervical or supraclavicular adenopathy  Respiratory: good airflow, mainly clear  CV: RRR, S1S2, no murmurs noted  Abdomen: normoactive BS, soft   Lymph: no edema  Neuro: AAO X 3, CN 2-12 grossly intact  Psychiatric: normal affect, good eye contact  Skin: no rash, jaundice or lesions on limited exam         Data:   All laboratory and imaging data reviewed.      Cystic Fibrosis Culture  Specimen Description   Date Value Ref Range Status   04/05/2021 Throat  Final   12/07/2020 Throat  Final   02/03/2020 Throat  Final    Culture Micro   Date Value Ref Range Status   04/05/2021 Moderate growth  Normal branden    Final   12/07/2020 Moderate growth  Normal branden    Final   02/03/2020 " Canceled, Test credited  Incorrect specimen type    Final   02/03/2020   Final    Notification of test cancellation was given to  Mindy in [facility]. They do not want a yeast culture instead. 2.3.20 at 1109 bw          PFT interpretation:  Maneuver: valid and meets ATS guidelines  Normal spirometry

## 2025-03-06 NOTE — TELEPHONE ENCOUNTER
PA Initiation    Medication: PULMOZYME 2.5 MG/2.5ML IN SOLN  Insurance Company: [employer] - Phone [phone] Fax [phone]  Pharmacy Filling the Rx:    Filling Pharmacy Phone:    Filling Pharmacy Fax:    Start Date: 3/6/2025    Key: [id number]

## 2025-03-06 NOTE — TELEPHONE ENCOUNTER
Prior Authorization Approval    Medication: TRIKAFTA 100-50-75 & 150 MG PO TBPK  Authorization Effective Date: 1/29/2025  Authorization Expiration Date: 2/28/2026  Approved Dose/Quantity: 84 for 28 ds   Reference #: [employer] LEENA Key: [id number]   Insurance Company: [employer] - Phone [phone] Fax [phone]  Expected CoPay: $    CoPay Card Available:      Financial Assistance Needed:   Which Pharmacy is filling the prescription:    Pharmacy Notified:   Patient Notified:     Refill too soon until 3/15/26 - verify if [facility] can fill and then offer

## 2025-03-10 ENCOUNTER — OFFICE VISIT (OUTPATIENT)
Dept: PULMONOLOGY | Facility: CLINIC | Age: 25
End: 2025-03-10
Attending: PHYSICIAN ASSISTANT
Payer: COMMERCIAL

## 2025-03-10 ENCOUNTER — LAB (OUTPATIENT)
Dept: LAB | Facility: CLINIC | Age: 25
End: 2025-03-10
Attending: PHYSICIAN ASSISTANT
Payer: COMMERCIAL

## 2025-03-10 ENCOUNTER — OFFICE VISIT (OUTPATIENT)
Dept: PHARMACY | Facility: CLINIC | Age: 25
End: 2025-03-10

## 2025-03-10 ENCOUNTER — TELEPHONE (OUTPATIENT)
Dept: PULMONOLOGY | Facility: CLINIC | Age: 25
End: 2025-03-10

## 2025-03-10 VITALS
SYSTOLIC BLOOD PRESSURE: 129 MMHG | BODY MASS INDEX: 26.83 KG/M2 | HEIGHT: 68 IN | WEIGHT: 177.03 LBS | DIASTOLIC BLOOD PRESSURE: 88 MMHG | HEART RATE: 100 BPM | OXYGEN SATURATION: 98 %

## 2025-03-10 DIAGNOSIS — E84.9 CF (CYSTIC FIBROSIS) (H): ICD-10-CM

## 2025-03-10 DIAGNOSIS — E84.0 CYSTIC FIBROSIS WITH PULMONARY MANIFESTATIONS (H): Primary | ICD-10-CM

## 2025-03-10 DIAGNOSIS — K86.81 EXOCRINE PANCREATIC INSUFFICIENCY: ICD-10-CM

## 2025-03-10 DIAGNOSIS — E84.9 CF (CYSTIC FIBROSIS) (H): Primary | ICD-10-CM

## 2025-03-10 LAB
ALBUMIN SERPL BCG-MCNC: 4.8 G/DL (ref 3.5–5.2)
ALP SERPL-CCNC: 127 U/L (ref 40–150)
ALT SERPL W P-5'-P-CCNC: 30 U/L (ref 0–70)
AST SERPL W P-5'-P-CCNC: 29 U/L (ref 0–45)
BILIRUB DIRECT SERPL-MCNC: 0.21 MG/DL (ref 0–0.3)
BILIRUB SERPL-MCNC: 0.6 MG/DL
CK SERPL-CCNC: 102 U/L (ref 39–308)
EXPTIME-PRE: 7.1 SEC
FEF2575-%PRED-PRE: 74 %
FEF2575-PRE: 3.25 L/SEC
FEF2575-PRED: 4.4 L/SEC
FEFMAX-%PRED-PRE: 94 %
FEFMAX-PRE: 9.23 L/SEC
FEFMAX-PRED: 9.8 L/SEC
FEV1-%PRED-PRE: 94 %
FEV1-PRE: 3.83 L
FEV1FEV6-PRE: 78 %
FEV1FEV6-PRED: 84 %
FEV1FVC-PRE: 77 %
FEV1FVC-PRED: 85 %
FIFMAX-PRE: 7.66 L/SEC
FVC-%PRED-PRE: 103 %
FVC-PRE: 4.96 L
FVC-PRED: 4.78 L
PROT SERPL-MCNC: 7.6 G/DL (ref 6.4–8.3)
TSH SERPL DL<=0.005 MIU/L-ACNC: 3.32 UIU/ML (ref 0.3–4.2)
VIT D+METAB SERPL-MCNC: 43 NG/ML (ref 20–50)

## 2025-03-10 PROCEDURE — 94375 RESPIRATORY FLOW VOLUME LOOP: CPT | Performed by: PHYSICIAN ASSISTANT

## 2025-03-10 PROCEDURE — 36415 COLL VENOUS BLD VENIPUNCTURE: CPT | Performed by: PATHOLOGY

## 2025-03-10 PROCEDURE — 99214 OFFICE O/P EST MOD 30 MIN: CPT | Mod: 25 | Performed by: PHYSICIAN ASSISTANT

## 2025-03-10 PROCEDURE — 1126F AMNT PAIN NOTED NONE PRSNT: CPT | Performed by: PHYSICIAN ASSISTANT

## 2025-03-10 PROCEDURE — 3074F SYST BP LT 130 MM HG: CPT | Performed by: PHYSICIAN ASSISTANT

## 2025-03-10 PROCEDURE — 87070 CULTURE OTHR SPECIMN AEROBIC: CPT | Performed by: PHYSICIAN ASSISTANT

## 2025-03-10 PROCEDURE — 82306 VITAMIN D 25 HYDROXY: CPT | Performed by: PHYSICIAN ASSISTANT

## 2025-03-10 PROCEDURE — 99213 OFFICE O/P EST LOW 20 MIN: CPT | Performed by: PHYSICIAN ASSISTANT

## 2025-03-10 PROCEDURE — 82550 ASSAY OF CK (CPK): CPT | Performed by: PATHOLOGY

## 2025-03-10 PROCEDURE — 3079F DIAST BP 80-89 MM HG: CPT | Performed by: PHYSICIAN ASSISTANT

## 2025-03-10 PROCEDURE — 99000 SPECIMEN HANDLING OFFICE-LAB: CPT | Performed by: PATHOLOGY

## 2025-03-10 PROCEDURE — 99207 PR NO CHARGE LOS: CPT | Performed by: PHARMACIST

## 2025-03-10 PROCEDURE — 84443 ASSAY THYROID STIM HORMONE: CPT | Performed by: PATHOLOGY

## 2025-03-10 PROCEDURE — 80076 HEPATIC FUNCTION PANEL: CPT | Performed by: PATHOLOGY

## 2025-03-10 RX ORDER — LEVOFLOXACIN 750 MG/1
750 TABLET, FILM COATED ORAL DAILY
Qty: 21 TABLET | Refills: 0 | Status: SHIPPED | OUTPATIENT
Start: 2025-03-10 | End: 2025-03-31

## 2025-03-10 RX ORDER — ALBUTEROL SULFATE 90 UG/1
INHALANT RESPIRATORY (INHALATION)
Qty: 8.5 G | Refills: 3 | Status: SHIPPED | OUTPATIENT
Start: 2025-03-10

## 2025-03-10 ASSESSMENT — PAIN SCALES - GENERAL: PAINLEVEL_OUTOF10: NO PAIN (0)

## 2025-03-10 NOTE — PROGRESS NOTES
CF Annual Nutrition Assessment     Reason for Assessment  Assessed during clinic visit with Nita Cedeño r/t increased nutrition risk with diagnosis of CF per protocol.     Nutrition Significant PMH  Mild Lung Disease - taking Trikafta  Pancreatic Insufficient  G-tube placed  -- fell out 2021 and maintaining weight  Reactive hypoglycemia with OGTT (since )     Anthropometric Assessment  Height: 172 cm  IBW based on BMI 22 for females and 23 for males per CF Foundation recs: 68.6 kg (151 lbs)  Today's Weight: 80.3 kg (actual weight)  Body mass index is 26.77 kg/m .    Wt Readings from Last 5 Encounters:   03/10/25 80.3 kg (177 lb 0.5 oz)   24 79.4 kg (175 lb)   24 79.4 kg (175 lb 0.7 oz)   24 79.4 kg (175 lb)   24 78.5 kg (173 lb 1 oz)     Comments: Weight trending upward over the last few years since starting Trikafta.      Pancreatic Enzymes  Brand:  Pancreaze [id number]  Dosin-7 with meals, 3 with snacks = 1831 units lipase/kg/meal  Estimated Daily Intake: 20-24 caps = 6276 units lipase/kg/day    Signs of Malabsorption:  No  Enzyme Program:  Yes - Pancreaze Engage    Switched from Creon to Pancreaze 2-3 months ago per pt's report.  No problems with GI symptoms per pt and no issues with pharmacy/coverage.      Diet History and Assessment  Diet Preferences/Allergies/Intolerances: High Kcal/Pro     Intake Recall/Comments: Fair/good appetite at baseline, reports typical intake BID meals and a couple snacks.  Reports no change in usual habits for meals/diet parameters/goals for weight and/or nutrition.      Sample Diet Recall (per previous):  Breakfast- skips, may have snacks like granola bars  Lunch- pack leftovers to eat at work or 3-4 ham sandwiches + 2 cups fruit + granola bar  Dinner- Mom cooks, usually meat/potatoes  Snacks- PB/cheese crackers, granola bars, yogurt, peanuts     Calcium: 3-4 servings dairy on average  Salt: adds to foods + sports beverages PRN  Hydration:  adequate, drinks lots of water/milk, occasionally sports drinks like Gatorade.   Supplements: Yes, obtaining from [facility], can't remember which supplement he received and has only used these PRN    Laboratory Assessment  Annual studies 8/27/24    Vitamin A - 0.7 wnl  Vitamin D - 22 low  Vitamin E - 18.4 high (correlates with elevated lipid panel)  Iron - 151 wnl  Lipid Panel - ;  recheck on 10/21/24 remained very high (557     OGTT- (8/27/24) indeterminate; fasting 106, 1-hr 238, 2 hr 82  DEXA- 2024, lowest z-score 0.7     Current Vitamin/Mineral Supplements: Vitamin D once daily (unsure of dose)     CF Related Diabetes Evaluation  Hx:  Keon asymptomatic with hypoglycemia noted on OGTT. Educated by  RD on 5/8/19 for diet recs to prevent reactive hypoglycemia.   -- Seen by endocrine/Dr. Castro and CDE 2019. Given glucose meter and recommended to complete BG checks if symptomatic. Complete OGTT annually.  -- Educated again by  RD 12/2021 regarding hypoglycemia. Pt reports he has plans to follow-up with endo.   -- Today reports no new sx or pattern with hypoglycemia, continues to work on diet based management including avoidance of sugary beverages     Nutrition Diagnosis  Impaired nutrient utilization related to pancreatic insufficiency as evidenced by requires pancreatic enzyme replacement therapy, high kcal/pro diet, and vitamin/mineral supplementation in order to maintain BMI >23 kg/m2 and support overall health.      Interventions/Recommendations  1) Oral Intake/Weight:  Weight change not discussed in detail this visit, updated weight data given to patient however. Recommending ongoing balanced diet with monitored/controlled simple carbohydrate intake for control of IGT/reactive hypoglycemia/hypertriglyceridemia. Encourage follow-up with endocrinology as per provider recommendations.  Pt is also being referred to cardiology for high TGL following this appt.     BEE: 1700   6835-9875 kcals/day  = 150-175% BEE   g protein/day = 1.2-1.5 g/kg     2) Enzymes: Doing well following switch to Pancreaze. Continue current dosing as pt reports no adverse GI sx r/t malabsorption.  Continue Pancreaze [employer] program for copay assistance (managed by pt's mom).      3) Vitamin/Mineral Supplementation: Pt wishes to remain off of CF multivitamins for now and continue vitamin D only.  Updated vitamin D level was drawn this morning and is currently pending.  Will evaluate dose/plan once this lab is available.  Next annual studies planned for August 2025, should pt require a CF multivitamin at that time recommend obtaining these at no cost through [employer].       GOALS:  1) Weight maintenance to current BMI without significant further gains.   2) Take vitamins/enzymes as prescribed.     FOLLOW-UP/MONITORING:  Visit patient within 12 month(s) for annual nutrition assessment.      Face to Face Time: 15 minutes      Kaye Stark MS, BHARATH, LD, CACFD   Cystic Fibrosis/CF Lung Transplant Dietitian      -Available Mon-Thurs 8 AM-4:30 PM. Contact via [email or web address] or Epic Inbasket.      -On Fridays please contact Willow Mendes RD and on weekends/holidays contact coverage dietitian via [email or web address] (inpatient use only).

## 2025-03-10 NOTE — LETTER
3/10/2025      Keon Conway  [id number]  [facility] [address]      Dear Colleague,    Thank you for referring your patient, Keon Conwya, to the [facility] [place]. Please see a copy of my visit note below.    [facility]  March 10, 2025         Assessment and Plan:   Keon Conway is a 24 year old male with cystic fibrosis who is seen today in clinic for routine follow up.     1. Mild exacerbation of CF lung disease: no recent illnesses, but has noticed an increase in frequency of cough since he ran out of morning Trikafta last Thurday. No new dyspnea. Sating 98% on room air. Doing airway clearance daily. PFTs today are significantly down from his last two visits. Previous cultures have grown out MSSA, H. Influenzae and Ps A (but not int he last two years).   - Continue nebs and vesting, increase to BID  - Start levaquin X 21 days; hold azithromycin   - Allergy referral for here (unable to do full medication testing at [facility]) again today--discussed answering the phone    2. CFTR modulation: on Trikafta and tolerating it well. Labs today WNL.   - Resume full dose Trikafa     3. Reactive hypoglycemia:   Fasting hyperglycemia:  Abnormal OGTT: AIC of 5.2 Fasting BS of 106, peaked > 200, but did not have a reactive low this time. Does have a glucometer at home, but hasn't checked BS in a few months.  - OGTT annually     4. Exocrine pancreatic insufficiency: no s/s of malabsorption. BMI > CFF goal.   - Vitamin D level in process  - Continue pancreatic enzymes and vitamin supplementation     5. Hypertriglyceridemia: elevated since 2005, mainly in the 200s. Remains elevated (> 500) on repeat fasting labs.  - Referral to Preventative Cardiology again--discussed answering the phone    RTC: PFTs locally in 3 weeks; 3 months in person  Annual: August 2025 (DEXA > August 2026)  Vaccinations:  flu shot today; declines covid vaccine    Nita Cedeño PA-C  Pulmonary, Allergy, Critical Care and Sleep Medicine        Interval History:     No recent illnesses, notes he's doing nebs and vesting daily. No fever or chills, no sinus or nasal drainage. Cough has increased the last few days, more frequent, mainly dry. No congestion, no tightness or wheezing. No new shortness of breath, notes he'll be more active at that time. No bloating or gas, stools are normal and sink. Ran out of Trikafta last Thursday, but still has the night pills.          Review of Systems:   Please see HPI. Otherwise, complete 10 point ROS negative.           Past Medical and Surgical History:     Past Medical History:   Diagnosis Date     CF (cystic fibrosis) (H) 11/30/2011     Chronic maxillary sinusitis 11/30/2011     Exocrine pancreatic insufficiency 11/30/2011     Past Surgical History:   Procedure Laterality Date     BRONCHOSCOPY (RIGID OR FLEXIBLE), DIAGNOSTIC N/A 10/2/2017    Procedure: COMBINED BRONCHOSCOPY (RIGID OR FLEXIBLE), LAVAGE;  Flexible Bronchoscopy With Lavage, PICC Line Placement ;  Surgeon: Darrel Dudley MD;  Location: [facility] OR     CATARACT IOL, RT/LT Left      EXAM UNDER ANESTHESIA EYE(S) Left 3/17/2015    Procedure: EXAM UNDER ANESTHESIA EYE(S);  Surgeon: Aamir Taylor MD;  Location: [facility] OR     HERNIA REPAIR  December 2014     INSERT PICC LINE Right 10/2/2017    Procedure: INSERT PICC LINE;;  Surgeon: Angeles Singh MD;  [facility] OR     LAPAROSCOPIC ASSISTED INSERTION TUBE GASTROTOMY N/A 10/16/2017    Procedure: LAPAROSCOPIC ASSISTED INSERTION TUBE GASTROSTOMY;  Laparoscopic Gastrostomy Tube Placement.;  Surgeon: Jeremy Obando MD;  [facility] OR     SCRUB ETHYLENEDIAMENETETRAACETIC (EDTA) Left 3/17/2015    Procedure: SCRUB ETHYLENEDIAMENETETRAACETIC (EDTA);  Surgeon: Aamir Taylor MD;  [facility] OR     SINUS SURGERY  3/2011           Family History:     Family History   Problem Relation  Age of Onset     Diabetes Paternal Grandfather             Social History:     Social History     Socioeconomic History     Marital status: Single     Spouse name: Not on file     Number of children: Not on file     Years of education: Not on file     Highest education level: Not on file   Occupational History     Not on file   Tobacco Use     Smoking status: Never     Smokeless tobacco: Never   Substance and Sexual Activity     Alcohol use: No     Drug use: No     Sexual activity: Not on file   Other Topics Concern     Parent/sibling w/ CABG, MI or angioplasty before 65F 55M? Not Asked   Social History Narrative    Mom is 35, Dad 39, both healthy.     Social Drivers of Health     Financial Resource Strain: Not on file   Food Insecurity: No Food Insecurity (2/16/2025)    [facility] [employer] [facility] [employer] [facility]    Hunger Vital Sign      Worried About Running Out of Food in the Last Year: Never true      Ran Out of Food in the Last Year: Never true   Transportation Needs: No Transportation Needs (2/16/2025)    [facility] [employer] [facility] [employer] [facility]    PRAPARE - Transportation      Lack of Transportation (Medical): No      Lack of Transportation (Non-Medical): No   Physical Activity: Not on file   Stress: Not on file   Social Connections: Not on file   Interpersonal Safety: Not on file   Housing Stability: Low Risk  (2/16/2025)    [facility] [employer] [facility] [employer] [facility]    Housing Stability Vital Sign      Unable to Pay for Housing in the Last Year: No      Number of Times Moved in the Last Year: 0      Homeless in the Last Year: No            Medications:     Current Outpatient Medications   Medication Sig Dispense Refill     levofloxacin (LEVAQUIN) 750 MG tablet Take 1 tablet (750 mg) by mouth daily for 21 days. 21 tablet 0     albuterol (VENTOLIN HFA) 108 (90 Base) MCG/ACT inhaler Inhale 2 puffs into the lungs every 4 hours as needed. 8.5 g 3      "azithromycin (ZITHROMAX) 500 MG tablet Take 1 tablet (500 mg) by mouth three times a week. 36 tablet 3     blood glucose (ACCU-CHEK GUIDE) test strip Use to test blood sugar 1 times daily or as directed. 50 strip 11     blood glucose monitoring (ACCU-CHEK FASTCLIX) lancets Use to test blood sugar 1 times daily or as directed. 102 each 3     dornase gloria (PULMOZYME) 2.5 MG/2.5ML neb solution Inhale 2.5 mg into the lungs 2 times daily 450 mL 3     elexacaftor-tezacaftor-ivacaftor & ivacaftor (TRIKAFTA) 100-50-75 & 150 MG tablet pack TAKE 2 ORANGE TABLETS BY  MOUTH IN THE MORNING AND 1  BLUE TABLET IN THE EVENING  WITH FAT-CONTAINING FOOD  (12 HOURS APART). 84 tablet 4     ipratropium - albuterol 0.5 mg/2.5 mg/3 mL (DUONEB) 0.5-2.5 (3) MG/3ML neb solution INHALE THE CONTENTS OF ONE VIAL INTO THE LUNGS BY NEBULIZATION THREE TIMES DAILY 270 mL 11     lipase-protease-amylase (PANCREAZE) 44391-32270-47661 units CPEP Take 5-7 capsules by mouth 3 times daily (with meals) And take 3 capsules with meals. Total 3 meals and 3 snacks daily for maximum 30 capsules per day. 900 capsule 11     mvw complete formulation (SOFTGELS ) capsule Take 1 capsule by mouth 2 times daily. 60 capsule 11     No current facility-administered medications for this visit.            Physical Exam:   /88 (BP Location: Right arm, Patient Position: Chair, Cuff Size: Adult Regular)   Pulse 100   Ht 1.732 m (5' 8.19\")   Wt 80.3 kg (177 lb 0.5 oz)   SpO2 98%   BMI 26.77 kg/m      GENERAL: alert, NAD  HEENT: NCAT, EOMI, anicteric sclera, no oral mucosal edema or erythema  Neck: no cervical or supraclavicular adenopathy  Respiratory: good airflow, mainly clear  CV: RRR, S1S2, no murmurs noted  Abdomen: normoactive BS, soft   Lymph: no edema  Neuro: AAO X 3, CN 2-12 grossly intact  Psychiatric: normal affect, good eye contact  Skin: no rash, jaundice or lesions on limited exam         Data:   All laboratory and imaging data reviewed.  "     Cystic Fibrosis Culture  Specimen Description   Date Value Ref Range Status   2021 Throat  Final   2020 Throat  Final   2020 Throat  Final    Culture Micro   Date Value Ref Range Status   2021 Moderate growth  Normal branden    Final   2020 Moderate growth  Normal branden    Final   2020 Canceled, Test credited  Incorrect specimen type    Final   2020   Final    Notification of test cancellation was given to  Mindy in [place]. They do not want a yeast culture instead. 2.3.20 at 1109 bw          PFT interpretation:  Maneuver: valid and meets ATS guidelines  Normal spirometry        CF Annual Nutrition Assessment     Reason for Assessment  Assessed during clinic visit with Nita Cedeño r/t increased nutrition risk with diagnosis of CF per protocol.     Nutrition Significant PMH  Mild Lung Disease - taking Trikafta  Pancreatic Insufficient  G-tube placed  -- fell out 2021 and maintaining weight  Reactive hypoglycemia with OGTT (since )     Anthropometric Assessment  Height: 172 cm  IBW based on BMI 22 for females and 23 for males per CF Foundation recs: 68.6 kg (151 lbs)  Today's Weight: 80.3 kg (actual weight)  Body mass index is 26.77 kg/m .    Wt Readings from Last 5 Encounters:   03/10/25 80.3 kg (177 lb 0.5 oz)   24 79.4 kg (175 lb)   24 79.4 kg (175 lb 0.7 oz)   24 79.4 kg (175 lb)   24 78.5 kg (173 lb 1 oz)     Comments: Weight trending upward over the last few years since starting Trikafta.      Pancreatic Enzymes  Brand:  Pancreaze [id number]  Dosin-7 with meals, 3 with snacks = 1831 units lipase/kg/meal  Estimated Daily Intake: 20-24 caps = 6276 units lipase/kg/day    Signs of Malabsorption:  No  Enzyme Program:  Yes - Pancreaze Engage    Switched from Creon to Pancreaze 2-3 months ago per pt's report.  No problems with GI symptoms per pt and no issues with pharmacy/coverage.      Diet History and Assessment  Diet  Preferences/Allergies/Intolerances: High Kcal/Pro     Intake Recall/Comments: Fair/good appetite at baseline, reports typical intake BID meals and a couple snacks.  Reports no change in usual habits for meals/diet parameters/goals for weight and/or nutrition.      Sample Diet Recall (per previous):  Breakfast- skips, may have snacks like granola bars  Lunch- pack leftovers to eat at work or 3-4 ham sandwiches + 2 cups fruit + granola bar  Dinner- Mom cooks, usually meat/potatoes  Snacks- PB/cheese crackers, granola bars, yogurt, peanuts     Calcium: 3-4 servings dairy on average  Salt: adds to foods + sports beverages PRN  Hydration: adequate, drinks lots of water/milk, occasionally sports drinks like Gatorade.   Supplements: Yes, obtaining from [employer], can't remember which supplement he received and has only used these PRN    Laboratory Assessment  Annual studies 8/27/24    Vitamin A - 0.7 wnl  Vitamin D - 22 low  Vitamin E - 18.4 high (correlates with elevated lipid panel)  Iron - 151 wnl  Lipid Panel - ;  recheck on 10/21/24 remained very high (557     OGTT- (8/27/24) indeterminate; fasting 106, 1-hr 238, 2 hr 82  DEXA- 2024, lowest z-score 0.7     Current Vitamin/Mineral Supplements: Vitamin D once daily (unsure of dose)     CF Related Diabetes Evaluation  Hx:  Keon asymptomatic with hypoglycemia noted on OGTT. Educated by  RD on 5/8/19 for diet recs to prevent reactive hypoglycemia.   -- Seen by endocrine/Dr. Castro and CDE 2019. Given glucose meter and recommended to complete BG checks if symptomatic. Complete OGTT annually.  -- Educated again by  RD 12/2021 regarding hypoglycemia. Pt reports he has plans to follow-up with endo.   -- Today reports no new sx or pattern with hypoglycemia, continues to work on diet based management including avoidance of sugary beverages     Nutrition Diagnosis  Impaired nutrient utilization related to pancreatic insufficiency as evidenced by requires  pancreatic enzyme replacement therapy, high kcal/pro diet, and vitamin/mineral supplementation in order to maintain BMI >23 kg/m2 and support overall health.      Interventions/Recommendations  1) Oral Intake/Weight:  Weight change not discussed in detail this visit, updated weight data given to patient however. Recommending ongoing balanced diet with monitored/controlled simple carbohydrate intake for control of IGT/reactive hypoglycemia/hypertriglyceridemia. Encourage follow-up with endocrinology as per provider recommendations.  Pt is also being referred to cardiology for high TGL following this appt.     BEE: 1700   2328-4332 kcals/day = 150-175% BEE   g protein/day = 1.2-1.5 g/kg     2) Enzymes: Doing well following switch to Pancreaze. Continue current dosing as pt reports no adverse GI sx r/t malabsorption.  Continue [employer] program for copay assistance (managed by pt's mom).      3) Vitamin/Mineral Supplementation: Pt wishes to remain off of CF multivitamins for now and continue vitamin D only.  Updated vitamin D level was drawn this morning and is currently pending.  Will evaluate dose/plan once this lab is available.  Next annual studies planned for August 2025, should pt require a CF multivitamin at that time recommend obtaining these at no cost through [employer].       GOALS:  1) Weight maintenance to current BMI without significant further gains.   2) Take vitamins/enzymes as prescribed.     FOLLOW-UP/MONITORING:  Visit patient within 12 month(s) for annual nutrition assessment.      Face to Face Time: 15 minutes      Kaye Stark MS, RD, LD, CACFD   Cystic Fibrosis/CF Lung Transplant Dietitian      -Available Mon-Thurs 8 AM-4:30 PM. Contact via [employer] or [employer] Inbasket.      -On Fridays please contact Willow Mendes RD and on weekends/holidays contact coverage dietitian via [employer] (inpatient use only).       Respiratory Therapist Note:         Vest                Brand: Hill-Rom -  traditional Hill Rom: Frequencies 8, 9, 10 at pressure 10 then frequencies 18, 19, 20 at pressure 6.                Cough Pause: Cough Pause; Yes                Vest Garment Size: Adult Small                Last Fitting Date: prn                 Frequency of therapy: 7 times per week                Concerns: vest tubes are currently duct taped - new tubing ordered today.          Exercise (purposeful and aerobic for >20 minutes each session): NO - does work in [employer], active throughout the day, so purposeful exercise                Does this qualify as additional airway clearance: No         Alternative Airway Clearance: none          Nebulized Medications                Bronchodilators: Albuterol and Atrovent                Mucolytic: Pulmozyme                Antibiotics:                 Additional Inhaled Medications: MDI (Albuterol MDI as needed         Review Cleaning: Yes. Top rack of .         Home Care:                Nebulizer Cups (Brand/Type): Nery LC and Sidestream                Nebulizer Compressor                            Year Purchased: 2022                             [facility], Phone: [phone], Fax: [phone]                Nebulizer Supply Company:                            [facility], Phone: [phone], Fax: [phone]         Oxygen: n/a         Pulmonary Rehab: n/a         Plan of Care and Goals for next visit: Vest tubing and neb supplies ordered today from appropriate companies. Please increase to 2-3 times daily treatments until repeat PFTs in 3 weeks.       Again, thank you for allowing me to participate in the care of your patient.        Sincerely,        Nita Cedeño PA-C    Electronically signed

## 2025-03-10 NOTE — PROGRESS NOTES
Respiratory Therapist Note:         Vest                Brand: Hill-Rom - traditional Hill Rom: Frequencies 8, 9, 10 at pressure 10 then frequencies 18, 19, 20 at pressure 6.                Cough Pause: Cough Pause; Yes                Vest Garment Size: Adult Small                Last Fitting Date: prn                 Frequency of therapy: 7 times per week                Concerns: vest tubes are currently duct taped - new tubing ordered today.          Exercise (purposeful and aerobic for >20 minutes each session): NO - does work in [employer], active throughout the day, so purposeful exercise                Does this qualify as additional airway clearance: No         Alternative Airway Clearance: none          Nebulized Medications                Bronchodilators: Albuterol and Atrovent                Mucolytic: Pulmozyme                Antibiotics:                 Additional Inhaled Medications: MDI (Albuterol MDI as needed         Review Cleaning: Yes. Top rack of .         Home Care:                Nebulizer Cups (Brand/Type): Nery LC and Sidestream                Nebulizer Compressor                            Year Purchased: 2022                             [facility], Phone: [phone], Fax: [phone]                Nebulizer Supply Company:                            [facility], Phone: [phone], Fax: [phone]         Oxygen: n/a         Pulmonary Rehab: n/a         Plan of Care and Goals for next visit: Vest tubing and neb supplies ordered today from appropriate companies. Please increase to 2-3 times daily treatments until repeat PFTs in 3 weeks.

## 2025-03-10 NOTE — PATIENT INSTRUCTIONS
See provider AVS for a summary of recommendations from today's visit.  Liss Croft, PharmD  Cystic Fibrosis MTM Pharmacist  [facility]

## 2025-03-10 NOTE — PROGRESS NOTES
Disease State Management Encounter:                          Keon Conway is a 25 year old male seen for a follow-up visit. Today's visit is a co-visit with Nita Cedeño PA-C.     Reason for visit: Annual CF Medication Review.    Medication Access: Patient fills medication at [facility] and [facility]. Patient expresses no concern regarding cost of medications.   Medication Administration: Per patient, typically misses medication 0 times per week. Recently missed last five days of Trikafta - he is temporarily living in [place] and supply was delivered to home. He will be picking up Trikafta refill today.      CF:   Inhaled medications:  Bronchodilator: Duoneb 0.5-2.5 mg daily and albuterol HFA inhaler (rarely, supply likely )  Mucolytic: Pulmozyme 2.5 mg once daily  Oral medications:  Azithromycin: 500 mg three times weekly  CFTR modulator: Trikafta (full dose) with high fat meals.   Increased respiratory symptoms today. Has not grown PsA for two years  He denies CF medication concerns/side effects. Experiencing moderate CF exacerbation.  Genotype: [id number]  Medication history:  Elgin grimes      Lab Results   Component Value Date    ALT 30 03/10/2025    AST 29 03/10/2025    BILITOTAL 0.6 03/10/2025    DBIL 0.21 03/10/2025     03/10/2025     Pancreatic Insufficiency/Nutrition:   Pancreaze 21,000 units 5-7 with meals and 3 with snacks    Vitamins include: MVW complete  twice daily  Patient is not experiencing sign/symptoms of malabsorption. No medication concerns today.     Assessment/Plan:    1. Trikafta labs within normal limits, continue full dose. Recheck hepatic panel and CK in 6 months. Encouraged Keon to transfer Trikafta refills to temporary location to avoid missed doses in future.  2. Per visit with Nita Cedeño PA-C, increase frequency of Duoneb and Pulmozyme to twice daily for next 3 weeks, then back to once daily. Start levofloxacin 750 mg  daily x 21 days. Hold azithromycin and space levofloxacin apart from vitamins.  3. Review of Alyftrek today, shared decision not interested in trialing Alyftrek at this time due to happy on current modulator. Patient will reach out if interested in revisiting in the future    Follow-up: 6 months for Trikafta labs, annual medication review in 1 year    I spent 10 minutes with this patient today. All changes were made via verbal approval with Nita Cedeño PA-C. A copy of the visit note was provided to the patient's provider(s).    A summary of these recommendations was given to the patient (see AVS from today's appointment with Nita Cedeño PA-C).    Liss Croft, PharmD   Cystic Fibrosis [facility] Pharmacist  [facility]     Medication Therapy Recommendations  No medication therapy recommendations to display

## 2025-03-10 NOTE — NURSING NOTE
Chief Complaint   Patient presents with    Follow Up     Return CF        Vitals were taken.     Christ Pappas, Clinic Assistant   8:12 AM

## 2025-03-10 NOTE — PATIENT INSTRUCTIONS
Cystic Fibrosis Self-Care Plan       Patient: Keon Conway   MRN: [id number]   Clinic Date: March 10, 2025     RECOMMENDATIONS:  1. Continue nebulizers and vest therapy. Increase Duonebs and pulmozyme to twice/day.   2. Take levaquin one tablet per day X 21 days. Take it 2 hours before or 2 hours after your multivitamin, magnesium and iron supplementation. Hold azithromycin while on this antibiotic.  3. Resume Trikafta.  4. PFTs locally in 3 weeks. You will need to call and schedule.    Annual Studies:   IGG   Date Value Ref Range Status   12/07/2020 979 610 - 1,616 mg/dL Final     Immunoglobulin G   Date Value Ref Range Status   08/27/2024 940 610 - 1,616 mg/dL Final     Insulin   Date Value Ref Range Status   08/27/2024 35.5 (H) 2.6 - 24.9 uU/mL Final   12/07/2021 13.8 3.0 - 25.0 mU/L Final   05/08/2019 5.7 3 - 25 mU/L Final     There are no preventive care reminders to display for this patient.    Pulmonary Function Tests  FEV1: amount of air you can blow out in 1 second  FVC: total amount of air you can take in and blow out    Your Goals:             Latest Ref Rng & Units 3/10/2025     7:32 AM   PFT   FVC L 4.96  P   FEV1 L 3.83  P   FVC% % 103  P   FEV1% % 94  P      P Preliminary result          Airway Clearance: The Most Important Way to Keep Your Lungs Healthy  Vest Settings:   Hill-Rom Frequencies: 8, 9, 10 Pressure 10 Then, Frequencies 18, 19, 20 Pressure 6     RespirTech: Quick Start with Pressure of     Do each frequency for 5 minutes; Deflate vest after each frequency & cough 3 times before beginning the next setting.    Vest and Neb Therapy should be done 2 times/day.    Good Nutrition Can Improve Lung Function and Overall Health    Take ALL of your vitamins with food    Take 1/2 of your enzymes before EVERY meal/snack and the other 1/2 mid-meal/snack    Wt Readings from Last 3 Encounters:   03/10/25 80.3 kg (177 lb 0.5 oz)   11/18/24 79.4 kg (175 lb)   08/27/24 79.4 kg (175 lb 0.7 oz)        Body mass index is 26.77 kg/m .         National CF Foundation Recommendations for BMI in CF Adults: Women: at least 22 Men: at least 23        Controlling Blood Sugars Helps Prevent Lung Infections & Improves Nutrition  Test blood sugar:    In the morning before eating (goal is )    2 hours after a meal (goal is less than 150)    When pre-meal glucose is greater than 150 add correction    At bedtime (if less than 100 eat a snack with 15 grams of carbohydrates  Last A1C Results:   Hemoglobin A1C   Date Value Ref Range Status   08/27/2024 5.2 <5.7 % Final     Comment:     Normal <5.7%   Prediabetes 5.7-6.4%    Diabetes 6.5% or higher     Note: Adopted from ADA consensus guidelines.   12/07/2020 5.1 0 - 5.6 % Final     Comment:     Normal <5.7% Prediabetes 5.7-6.4%  Diabetes 6.5% or higher - adopted from ADA   consensus guidelines.           If diabetic, measure A1C every 6 months. Goal is under 7%.    Staying Healthy  Research: If you are interested in learning about research opportunities or have questions, please contact Lily Daniels at [phone] or [email or web address].    CF Foundation: Compass is a personalized resource service to help you with the insurance, financial, legal and other issues you are facing.  It's free, confidential and available to anyone with CF.  Ask your  for more information or contact Compass directly at 040-COMPASS ([phone]) or compass@cff.org, or learn more at cff.org/compass.       CF Nurse Line: Agatha Montiel and KJ: [phone]  Kanchan Licona or Vani Murphy RT: [phone]    Kaye Mendes , Dieticians: [phone]    Mindy Pedro, Diabetes Nurse: [phone]   Yenny Yancey: [phone] or Lakisha Mayers at [phone], Social Workers  [email or web address]

## 2025-03-13 LAB — BACTERIA SPEC CULT: NORMAL

## 2025-03-15 LAB — BACTERIA SPEC CULT: NORMAL

## 2025-03-18 DIAGNOSIS — E84.9 CYSTIC FIBROSIS (H): ICD-10-CM

## 2025-03-18 RX ORDER — ELEXACAFTOR, TEZACAFTOR, AND IVACAFTOR 100-50-75
KIT ORAL
Qty: 84 TABLET | Refills: 4 | Status: SHIPPED | OUTPATIENT
Start: 2025-03-18

## 2025-03-26 NOTE — TELEPHONE ENCOUNTER
FUTURE VISIT INFORMATION      FUTURE VISIT INFORMATION:  Date: 5/30/25  Time: 8a  [place] [facility] [place]  REFERRAL INFORMATION:  Referring provider:  Nita Cedeño PA-C   Referring providers clinic:  [facility]  Reason for visit/diagnosis: E84.9 (ICD-10-CM) - CF (cystic fibrosis) (H), Assess medication allergies, true allergies, h/o CF?     RECORDS REQUESTED FROM:       Clinic name Comments Records Status   [facility] 3/10/25 - OV, [facility]   [facility] 3/10/25 - OV, [facility]

## 2025-04-04 LAB
FEV-1: NORMAL
FVC: NORMAL

## 2025-04-07 DIAGNOSIS — E84.9 CF (CYSTIC FIBROSIS) (H): ICD-10-CM

## 2025-04-08 ENCOUNTER — EXTERNAL ORDER RESULTS (OUTPATIENT)
Dept: PULMONOLOGY | Facility: CLINIC | Age: 25
End: 2025-04-08
Payer: COMMERCIAL

## 2025-05-29 ENCOUNTER — TELEPHONE (OUTPATIENT)
Dept: PULMONOLOGY | Facility: CLINIC | Age: 25
End: 2025-05-29

## 2025-05-29 NOTE — TELEPHONE ENCOUNTER
SHUBHAM INITIATED    Medication: PULMOZYME 2.5 MG/2.5ML IN SOLN  [facility] Name: [facility] CF treatment shubham  Date submitted: 5/29/2025  1:52 PM   Foundation Phone:    [facility] Fax:     [facility] CF treatment shubham open, sent [employer] message

## 2025-05-30 ENCOUNTER — VIRTUAL VISIT (OUTPATIENT)
Dept: ALLERGY | Facility: CLINIC | Age: 25
End: 2025-05-30
Attending: PHYSICIAN ASSISTANT
Payer: COMMERCIAL

## 2025-05-30 ENCOUNTER — PRE VISIT (OUTPATIENT)
Dept: ALLERGY | Facility: CLINIC | Age: 25
End: 2025-05-30

## 2025-05-30 DIAGNOSIS — Z88.9 MULTIPLE DRUG ALLERGIES: Primary | ICD-10-CM

## 2025-05-30 DIAGNOSIS — E84.9 CF (CYSTIC FIBROSIS) (H): ICD-10-CM

## 2025-05-30 PROCEDURE — 98006 SYNCH AUDIO-VIDEO EST MOD 30: CPT | Performed by: DERMATOLOGY

## 2025-05-30 NOTE — NURSING NOTE
Chief Complaint   Patient presents with    Allergy Consult     Video visit consult, medication allergies (Hx of CF)     Lily Werner RN

## 2025-05-30 NOTE — PROGRESS NOTES
[facility] Dermato-allergology Note  Virtual visit: store and forward video ([employer]), start time: 7:55am, end time: 8:20am, date of images: none  Encounter Date: May 30, 2025    Virtual Visit Details  Type of service:  Video Visit   Originating Location (pt. Location): Home    Distant Location (provider location):  On-site  Platform used for Video Visit: Inocencio  ____________________________________________    CC: Allergy Consult (Video visit consult, medication allergies (Hx of CF))    HPI:  (May 30, 2025)  Mr. Keon Conway is a(n) 25 year old male who presents today as new patient for allergy consultation  - Referred by Nita CONDON on 11/18/2024 for possible drug allergies, pt with history of cystic fibrosis.   - On Trikafta for CF for 3-4 years which has been helpful   - Otherwise feeling well in usual state of health    Physical Exam:  General: In no acute distress, well-developed, well-nourished  Eyes: no conjunctivitis  ENT: no signs of rhinitis   Pulmonary: no wheezing or coughing  Skin: Focused examination of the skin on test sites was performed = see test results below    Past Medical History:   Patient Active Problem List   Diagnosis    CF (cystic fibrosis) (H)    Exocrine pancreatic insufficiency    Chronic maxillary sinusitis    Abnormal glucose tolerance test    Esophageal reflux    Pseudophakia of left eye    Band-shaped keratopathy    Retinal detachment with retinal defect    Exacerbation of cystic fibrosis (H)    Cystic fibrosis exacerbation (H)     Past Medical History:   Diagnosis Date    CF (cystic fibrosis) (H) 11/30/2011    Chronic maxillary sinusitis 11/30/2011    Exocrine pancreatic insufficiency 11/30/2011       Allergies:  Allergies   Allergen Reactions    Meropenem      Questionable cause of peripheral eosinophilia    Amoxicillin Rash    Cefepime Rash     Had a rash while on cefepime and IV tobramycin    Penicillins Rash     Tolerated ceftazidime on  9/25/2013, cefepime on 10/4/2017, and cefazolin on 10/7/2017    Sulfa Antibiotics Rash    Tobramycin Rash     Had a rash while on cefepime and IV tobramycin    Vancomycin Itching     Pre-dose with Benadryl       Medications:  Current Outpatient Medications   Medication Sig Dispense Refill    albuterol (VENTOLIN HFA) 108 (90 Base) MCG/ACT inhaler Inhale 2 puffs into the lungs every 4 hours as needed. 8.5 g 3    azithromycin (ZITHROMAX) 500 MG tablet Take 1 tablet (500 mg) by mouth three times a week. 36 tablet 3    dornase gloria (PULMOZYME) 2.5 MG/2.5ML neb solution Inhale 2.5 mg into the lungs 2 times daily. 450 mL 3    elexacaftor-tezacaftor-ivacaftor & ivacaftor (TRIKAFTA) 100-50-75 & 150 MG tablet pack TAKE 2 ORANGE TABLETS BY  MOUTH IN THE MORNING AND 1  BLUE TABLET IN THE EVENING  WITH FAT-CONTAINING FOOD  (12 HOURS APART). 84 tablet 4    ipratropium - albuterol 0.5 mg/2.5 mg/3 mL (DUONEB) 0.5-2.5 (3) MG/3ML neb solution INHALE THE CONTENTS OF ONE VIAL INTO THE LUNGS BY NEBULIZATION THREE TIMES DAILY 270 mL 11    lipase-protease-amylase (PANCREAZE) 83631-16935-33641 units CPEP Take 5-7 capsules by mouth 3 times daily (with meals) And take 3 capsules with meals. Total 3 meals and 3 snacks daily for maximum 30 capsules per day. 900 capsule 11    mvw complete formulation (SOFTGELS ) capsule Take 1 capsule by mouth 2 times daily. 60 capsule 11     No current facility-administered medications for this visit.       Previous Labs, Allergy Tests, Dermatopathology, Imaging:  3/10/2025 OV visit with Nita CONDON (Pul)  FROM [facility] [place]:  No recent illnesses, notes he's doing nebs and vesting daily. No fever or chills, no sinus or nasal drainage. Cough has increased the last few days, more frequent, mainly dry. No congestion, no tightness or wheezing. No new shortness of breath, notes he'll be more active at that time. No bloating or gas, stools are normal and sink. Ran out of Trikafta last Thursday, but still has the  night pills.     FROM A&P:  1. Mild exacerbation of CF lung disease: no recent illnesses, but has noticed an increase in frequency of cough since he ran out of morning Trikafta last Thurday. No new dyspnea. Sating 98% on room air. Doing airway clearance daily. PFTs today are significantly down from his last two visits. Previous cultures have grown out MSSA, H. Influenzae and Ps A (but not int he last two years).   - Continue nebs and vesting, increase to BID  - Start levaquin X 21 days; hold azithromycin   - Allergy referral for here (unable to do full medication testing at [place]) again today--discussed answering the phone     2. CFTR modulation: on Trikafta and tolerating it well. Labs today WNL.   - Resume full dose Trikafa     3. Reactive hypoglycemia:   Fasting hyperglycemia:  Abnormal OGTT: AIC of 5.2 Fasting BS of 106, peaked > 200, but did not have a reactive low this time. Does have a glucometer at home, but hasn't checked BS in a few months.  - OGTT annually      4. Exocrine pancreatic insufficiency: no s/s of malabsorption. BMI > CFF goal.   - Vitamin D level in process  - Continue pancreatic enzymes and vitamin supplementation     5. Hypertriglyceridemia: elevated since 2005, mainly in the 200s. Remains elevated (> 500) on repeat fasting labs.  - Referral to Preventative Cardiology again--discussed answering the phone     RTC: PFTs locally in 3 weeks; 3 months in person  Annual: August 2025 (DEXA > August 2026)  Vaccinations: flu shot today; declines covid vaccine      Referred By: Nita Cedeño PA-C  [address] [place] [id number]     Allergy Tests:  Past Allergy Test    Family History:  Family History   Problem Relation Age of Onset    Diabetes Paternal Grandfather        Social History:  The patient works in a farm.   Patient has the following hobbies or non-occupational exposure: n/a.    Order for Future Allergy Testing:    [x] Outpatient  [] Inpatient: [facility]..../ Bed ...    Skin  Atopy (atopic dermatitis)? [] Yes     [x] No  Comments:   Childhood eczema?   [] Yes     [x] No  Comments:   Hand eczema?   [] Yes     [x] No  If yes, leading hand? [] Right     [] Left     [] Ambidextrous  Comments:     Contact allergies?     Comments:   Including adhesives/bandages? [] Yes     [x] No  Comments:   Including metals?   [] Yes     [x] No  Comments:   Other substances?   [] Yes     [x] No  Comments:     Drug allergies?   [x] Yes     [] No  Comments:   - In 2019 had rash on the right side of his body (from chest to waist), while on cefepime and IV tobramycin, no rash on the left side. Rash lasted for about a day, maybe two at most, and was only itchy when something rubbed against it.   - not sure if urticarial or more maculopapular   - As a child he was told he was allergic to penicillins and sulfa drugs, patient not sure what the reaction was; mother possibly knows  - Has not been on vancomycin for a while (2017), when he would receive it, he would pre-medicate with benadryl  - only had itching, but no rash; itching stopped after taking benadryl    Angioedema?   [] Yes     [x] No  Comments:     Urticaria?   [] Yes     [x] No  Comments:     Food allergies?   [] Yes     [x] No  Comments:     Pet allergies?   [] Yes     [x] No  Comments:   - has two dogs, no issues    Environmental allergy symptoms?  [] Conjunctivitis  [] Otitis  [] Pharyngitis  [] Polyposis  [] Postnasal Drip  [] Rhinitis  [] Sinusitis  [x] None  Comments:     HENT Operations?  [] Adenoids [] Septum [] Sinus  [] Tonsils        [] Other:   [x] None  Comments:     Pulmonary symptoms (from birth to present)?  [] Asthma bronchiale  Inhaler(s)?:   [] Coughing  [x] Other  [] None  Comments: - hx of CF    Environmental and pulmonary symptoms aggravated by?  Season: [x] None     [] I     [] II     [] III     [] IV     [] V     [] VI          [] VII     [] VIII     [] IX     [] X     [] XI     [] XII     [] Perennial  Time of Day: [x] None     []  Morning     [] Noon     [] Evening     [] Night     [] Whole Day  Location/Changes: [x] None     [] Inside     [] Outside     [] Mountain     [] Sea     [] Other:   Triggers (specific): [x] None     [] Animals     [] Dust     [] Mold     [] Plants     [] Other:   Triggers (other): [x] None     [] Psyche     [] Sport     [] Work     [] Other:   Irritant: [x] None     [] Cold     [] Heat     [] Odors     [] Physical Efforts     [] Smoke     [] Other:     Order for PATCH TESTS  Reason for tests (suspected allergy): n/a  Known previous allergies: n/a  Standardized panels  [] Standard panel (40 tests)  [] Preservatives & Antimicrobials (31 tests)  [] Emulsifiers & Additives (25 tests)   [] Perfumes/Flavours & Plants (25 tests)  [] Hairdresser panel (12 tests)  [] Rubber Chemicals (22 tests)  [] Plastics (26 tests)  [] Colorants/Dyes/Food additives (20 tests)  [] Metals (implants/dental) (24 tests)  [] Local anaesthetics/NSAIDs (13 tests)  [] Antibiotics & Antimycotics (14 tests)   [] Corticosteroids (15 tests)   [] Photopatch test (62 tests)   [] Others:     [] Patient's Own Products:   DO NOT test if chemical or biological identity is unknown!   always ask from patient the product information and safety sheets (MSDS)       Order for PRICK TESTS    Reason for tests (suspected allergy): n/a  Known previous allergies: n/a    Standardized prick panels  [x] Atopic panel (20 tests)  [] Pediatric Panel (12 tests)  [] Milk, Meat, Eggs, Grains (20 tests)   [] Dust, Epithelia, Feathers (10 tests)  [] Fish, Seafood, Shellfish (17 tests)  [] Nuts, Beans (14 tests)  [] Spice, Vegetable, Fruit (17 tests)  [] Pollen Panel = Tree, Grass, Weed (24 tests)  [] Others:     [] Patient's Own Products:   DO NOT test if chemical or biological identity is unknown!   always ask from patient the product information and safety sheets (MSDS)     Standardized intradermal tests  [x] Alternaria alternata  [x] Aspergillus fumigatus  [x] Cladosporium  herbarum   [x] Penicillium notatum  [] Dermatophagoides farinae  [x] Dermatophagoides pteronyssinus  [x] Dog Epithelium  [] Cat Epithelium  [] Others:     [] Bee venom   [] Wasp venom  !!Specific protocol with dilutions!!       Order for Drug allergy tests (prick & intradermal & patch tests)    [x] Penicillin G     [x] Ampicillin   [] Cefazolin (1st gen)     [] Cefuroxime (2nd gen)     [] Ceftriaxone (3rd gen)     [] Ceftazidime (3rd gen)     [x] Cefepime (4th gen)       [x] Bactrim  [] Iodixanol (Visipaque)     [] Iopamidol (Isovue)       [] Iohexol (Omnipaque)  [x] Others: tobramycin, vancomycin, meropenem   [] Patient's Own:   Order for ... as test date  ________________________________    Assessment & Plan:    ==> Final Diagnosis:     # Ruling out multiple drug reactions   Eosinophilia and rash on right side of body to meropenem, cefepime, and tobramycin   Unknown rashes as child to penicillins and sulfa antibiotics  Itching for a few hours after vancomycin (could be treated with pre-medication benadryl)  * acute illness with systemic symptoms    # No signs for atopic predisposition   * self-limited/minor problem    # CF treated with Trikafta   * chronic illness with exacerbation, progression, side effects from treatment    These conclusions are made at the best of one's knowledge and belief based on the provided evidence such as patient's history and allergy test results and they can change over time or can be incomplete because of missing information.    ==> Treatment Plan:    >> Will plan for drug allergy tests and atopy screen at next appointment   - Advised patient to avoid antihistamines a week prior to allergy tests    Procedures Performed: none    Staff Involved: Provider, Staff, and Scribe    Scribe Disclosure:   I, Cece Arambula, am serving as a scribe to document services personally performed by Washington Davis MD based on data collection and the provider's statements to me.     Staff Physician  Comments:  I was present with the scribe who participated in the documentation of the note. I have verified the history and personally performed the physical exam and medical decision making. I agree with the assessment and plan as documented in the note. I have reviewed and if necessary amended the note.      Washington Davis MD  Professor  Head of Dermato-Allergy Division  Department of Dermatology  [facility]      Follow-up in Derm-Allergy clinic for drug allergy tests as planned   ____________________________________________    I spent a total of 3d0 minutes with Keon Conway. This time was spent counseling the patient and/or coordinating care, explaining the allergy tests, and assessing the clinical relevance.

## 2025-05-30 NOTE — LETTER
5/30/2025      Keon Conway  [address], [place] [address]      Dear Colleague,    Thank you for referring your patient, Keon Conway, to the [facility] [place]. Please see a copy of my visit note below.    [facility] Dermato-allergology Note  Virtual visit: store and forward video ([employer] connected), start time: 7:55am, end time: 8:20am, date of images: none  Encounter Date: May 30, 2025    Virtual Visit Details  Type of service:  Video Visit   Originating Location (pt. Location): Home    Distant Location (provider location):  On-site  Platform used for Video Visit: [employer] Mobile  ____________________________________________    CC: Allergy Consult (Video visit consult, medication allergies (Hx of CF))    HPI:  (May 30, 2025)  Mr. Keon Conway is a(n) 25 year old male who presents today as new patient for allergy consultation  - Referred by Nita CONDON on 11/18/2024 for possible drug allergies, pt with history of cystic fibrosis.   - On Trikafta for CF for 3-4 years which has been helpful   - Otherwise feeling well in usual state of health    Physical Exam:  General: In no acute distress, well-developed, well-nourished  Eyes: no conjunctivitis  ENT: no signs of rhinitis   Pulmonary: no wheezing or coughing  Skin: Focused examination of the skin on test sites was performed = see test results below    Past Medical History:   Patient Active Problem List   Diagnosis     CF (cystic fibrosis) (H)     Exocrine pancreatic insufficiency     Chronic maxillary sinusitis     Abnormal glucose tolerance test     Esophageal reflux     Pseudophakia of left eye     Band-shaped keratopathy     Retinal detachment with retinal defect     Exacerbation of cystic fibrosis (H)     Cystic fibrosis exacerbation (H)     Past Medical History:   Diagnosis Date     CF (cystic fibrosis) (H) 11/30/2011     Chronic maxillary sinusitis 11/30/2011     Exocrine pancreatic  insufficiency 11/30/2011       Allergies:  Allergies   Allergen Reactions     Meropenem      Questionable cause of peripheral eosinophilia     Amoxicillin Rash     Cefepime Rash     Had a rash while on cefepime and IV tobramycin     Penicillins Rash     Tolerated ceftazidime on 9/25/2013, cefepime on 10/4/2017, and cefazolin on 10/7/2017     Sulfa Antibiotics Rash     Tobramycin Rash     Had a rash while on cefepime and IV tobramycin     Vancomycin Itching     Pre-dose with Benadryl       Medications:  Current Outpatient Medications   Medication Sig Dispense Refill     albuterol (VENTOLIN HFA) 108 (90 Base) MCG/ACT inhaler Inhale 2 puffs into the lungs every 4 hours as needed. 8.5 g 3     azithromycin (ZITHROMAX) 500 MG tablet Take 1 tablet (500 mg) by mouth three times a week. 36 tablet 3     dornase gloria (PULMOZYME) 2.5 MG/2.5ML neb solution Inhale 2.5 mg into the lungs 2 times daily. 450 mL 3     elexacaftor-tezacaftor-ivacaftor & ivacaftor (TRIKAFTA) 100-50-75 & 150 MG tablet pack TAKE 2 ORANGE TABLETS BY  MOUTH IN THE MORNING AND 1  BLUE TABLET IN THE EVENING  WITH FAT-CONTAINING FOOD  (12 HOURS APART). 84 tablet 4     ipratropium - albuterol 0.5 mg/2.5 mg/3 mL (DUONEB) 0.5-2.5 (3) MG/3ML neb solution INHALE THE CONTENTS OF ONE VIAL INTO THE LUNGS BY NEBULIZATION THREE TIMES DAILY 270 mL 11     lipase-protease-amylase (PANCREAZE) 79352-56[address]-04038 units CPEP Take 5-7 capsules by mouth 3 times daily (with meals) And take 3 capsules with meals. Total 3 meals and 3 snacks daily for maximum 30 capsules per day. 900 capsule 11     mvw complete formulation (SOFTGELS ) capsule Take 1 capsule by mouth 2 times daily. 60 capsule 11     No current facility-administered medications for this visit.       Previous Labs, Allergy Tests, Dermatopathology, Imaging:  3/10/2025 OV visit with Nita CONDON (Pulm)  FROM [facility]:  No recent illnesses, notes he's doing nebs and vesting daily. No fever or chills, no sinus or nasal  drainage. Cough has increased the last few days, more frequent, mainly dry. No congestion, no tightness or wheezing. No new shortness of breath, notes he'll be more active at that time. No bloating or gas, stools are normal and sink. Ran out of Trikafta last Thursday, but still has the night pills.     FROM A&P:  1. Mild exacerbation of CF lung disease: no recent illnesses, but has noticed an increase in frequency of cough since he ran out of morning Trikafta last Thurday. No new dyspnea. Sating 98% on room air. Doing airway clearance daily. PFTs today are significantly down from his last two visits. Previous cultures have grown out MSSA, H. Influenzae and Ps A (but not int he last two years).   - Continue nebs and vesting, increase to BID  - Start levaquin X 21 days; hold azithromycin   - Allergy referral for here (unable to do full medication testing at [facility]) again today--discussed answering the phone     2. CFTR modulation: on Trikafta and tolerating it well. Labs today WNL.   - Resume full dose Trikafa     3. Reactive hypoglycemia:   Fasting hyperglycemia:  Abnormal OGTT: AIC of 5.2 Fasting BS of 106, peaked > 200, but did not have a reactive low this time. Does have a glucometer at home, but hasn't checked BS in a few months.  - OGTT annually      4. Exocrine pancreatic insufficiency: no s/s of malabsorption. BMI > CFF goal.   - Vitamin D level in process  - Continue pancreatic enzymes and vitamin supplementation     5. Hypertriglyceridemia: elevated since 2005, mainly in the 200s. Remains elevated (> 500) on repeat fasting labs.  - Referral to Preventative Cardiology again--discussed answering the phone     RTC: PFTs locally in 3 weeks; 3 months in person  Annual: August 2025 (DEXA > August 2026)  Vaccinations: flu shot today; declines covid vaccine      Referred By: Nita Cedeño PA-C  [address] [place] [id number]     Allergy Tests:  Past Allergy Test    Family History:  Family History    Problem Relation Age of Onset     Diabetes Paternal Grandfather        Social History:  The patient works in a farm.   Patient has the following hobbies or non-occupational exposure: n/a.    Order for Future Allergy Testing:    [x] Outpatient  [] Inpatient: [facility]..../ Bed ...    Skin Atopy (atopic dermatitis)? [] Yes     [x] No  Comments:   Childhood eczema?   [] Yes     [x] No  Comments:   Hand eczema?   [] Yes     [x] No  If yes, leading hand? [] Right     [] Left     [] Ambidextrous  Comments:     Contact allergies?     Comments:   Including adhesives/bandages? [] Yes     [x] No  Comments:   Including metals?   [] Yes     [x] No  Comments:   Other substances?   [] Yes     [x] No  Comments:     Drug allergies?   [x] Yes     [] No  Comments:   - In 2019 had rash on the right side of his body (from chest to waist), while on cefepime and IV tobramycin, no rash on the left side. Rash lasted for about a day, maybe two at most, and was only itchy when something rubbed against it.   - not sure if urticarial or more maculopapular   - As a child he was told he was allergic to penicillins and sulfa drugs, patient not sure what the reaction was; mother possibly knows  - Has not been on vancomycin for a while (2017), when he would receive it, he would pre-medicate with benadryl  - only had itching, but no rash; itching stopped after taking benadryl    Angioedema?   [] Yes     [x] No  Comments:     Urticaria?   [] Yes     [x] No  Comments:     Food allergies?   [] Yes     [x] No  Comments:     Pet allergies?   [] Yes     [x] No  Comments:   - has two dogs, no issues    Environmental allergy symptoms?  [] Conjunctivitis  [] Otitis  [] Pharyngitis  [] Polyposis  [] Postnasal Drip  [] Rhinitis  [] Sinusitis  [x] None  Comments:     HENT Operations?  [] Adenoids [] Septum [] Sinus  [] Tonsils        [] Other:   [x] None  Comments:     Pulmonary symptoms (from birth to present)?  [] Asthma bronchiale  Inhaler(s)?:   []  Coughing  [x] Other  [] None  Comments: - hx of CF    Environmental and pulmonary symptoms aggravated by?  Season: [x] None     [] I     [] II     [] III     [] IV     [] V     [] VI          [] VII     [] VIII     [] IX     [] X     [] XI     [] XII     [] Perennial  Time of Day: [x] None     [] Morning     [] Noon     [] Evening     [] Night     [] Whole Day  Location/Changes: [x] None     [] Inside     [] Outside     [] Mountain     [] Sea     [] Other:   Triggers (specific): [x] None     [] Animals     [] Dust     [] Mold     [] Plants     [] Other:   Triggers (other): [x] None     [] Psyche     [] Sport     [] Work     [] Other:   Irritant: [x] None     [] Cold     [] Heat     [] Odors     [] Physical Efforts     [] Smoke     [] Other:     Order for PATCH TESTS  Reason for tests (suspected allergy): n/a  Known previous allergies: n/a  Standardized panels  [] Standard panel (40 tests)  [] Preservatives & Antimicrobials (31 tests)  [] Emulsifiers & Additives (25 tests)   [] Perfumes/Flavours & Plants (25 tests)  [] Hairdresser panel (12 tests)  [] Rubber Chemicals (22 tests)  [] Plastics (26 tests)  [] Colorants/Dyes/Food additives (20 tests)  [] Metals (implants/dental) (24 tests)  [] Local anaesthetics/NSAIDs (13 tests)  [] Antibiotics & Antimycotics (14 tests)   [] Corticosteroids (15 tests)   [] Photopatch test (62 tests)   [] Others:     [] Patient's Own Products:   DO NOT test if chemical or biological identity is unknown!   always ask from patient the product information and safety sheets (MSDS)       Order for PRICK TESTS    Reason for tests (suspected allergy): n/a  Known previous allergies: n/a    Standardized prick panels  [x] Atopic panel (20 tests)  [] Pediatric Panel (12 tests)  [] Milk, Meat, Eggs, Grains (20 tests)   [] Dust, Epithelia, Feathers (10 tests)  [] Fish, Seafood, Shellfish (17 tests)  [] Nuts, Beans (14 tests)  [] Spice, Vegetable, Fruit (17 tests)  [] Pollen Panel = Tree, Grass, Weed  (24 tests)  [] Others:     [] Patient's Own Products:   DO NOT test if chemical or biological identity is unknown!   always ask from patient the product information and safety sheets (MSDS)     Standardized intradermal tests  [x] Alternaria alternata  [x] Aspergillus fumigatus  [x] Cladosporium herbarum   [x] Penicillium notatum  [] Dermatophagoides farinae  [x] Dermatophagoides pteronyssinus  [x] Dog Epithelium  [] Cat Epithelium  [] Others:     [] Bee venom   [] Wasp venom  !!Specific protocol with dilutions!!       Order for Drug allergy tests (prick & intradermal & patch tests)    [x] Penicillin G     [x] Ampicillin   [] Cefazolin (1st gen)     [] Cefuroxime (2nd gen)     [] Ceftriaxone (3rd gen)     [] Ceftazidime (3rd gen)     [x] Cefepime (4th gen)       [x] Bactrim  [] Iodixanol (Visipaque)     [] Iopamidol (Isovue)       [] Iohexol (Omnipaque)  [x] Others: tobramycin, vancomycin, meropenem   [] Patient's Own:   Order for ... as test date  ________________________________    Assessment & Plan:    ==> Final Diagnosis:     # Ruling out multiple drug reactions   Eosinophilia and rash on right side of body to meropenem, cefepime, and tobramycin   Unknown rashes as child to penicillins and sulfa antibiotics  Itching for a few hours after vancomycin (could be treated with pre-medication benadryl)  * acute illness with systemic symptoms    # No signs for atopic predisposition   * self-limited/minor problem    # CF treated with Trikafta   * chronic illness with exacerbation, progression, side effects from treatment    These conclusions are made at the best of one's knowledge and belief based on the provided evidence such as patient's history and allergy test results and they can change over time or can be incomplete because of missing information.    ==> Treatment Plan:    >> Will plan for drug allergy tests and atopy screen at next appointment   - Advised patient to avoid antihistamines a week prior to allergy  tests    Procedures Performed: none    Staff Involved: Provider, Staff, and Scribe    Scribe Disclosure:   I, Cece Arambula, am serving as a scribe to document services personally performed by Washington Davis MD based on data collection and the provider's statements to me.     Staff Physician Comments:  I was present with the scribe who participated in the documentation of the note. I have verified the history and personally performed the physical exam and medical decision making. I agree with the assessment and plan as documented in the note. I have reviewed and if necessary amended the note.      Washington Davis MD  Professor  Head of Dermato-Allergy Division  Department of Dermatology  [facility]      Follow-up in Derm-Allergy clinic for drug allergy tests as planned   ____________________________________________    I spent a total of 3d0 minutes with Keon Conway. This time was spent counseling the patient and/or coordinating care, explaining the allergy tests, and assessing the clinical relevance.     Again, thank you for allowing me to participate in the care of your patient.        Sincerely,        Washington Davis MD    Electronically signed

## 2025-07-03 DIAGNOSIS — K86.81 EXOCRINE PANCREATIC INSUFFICIENCY: ICD-10-CM

## 2025-07-03 DIAGNOSIS — E84.9 CF (CYSTIC FIBROSIS) (H): ICD-10-CM

## 2025-07-07 NOTE — TELEPHONE ENCOUNTER
RECORDS RECEIVED FROM:    DATE RECEIVED:    NOTES STATUS DETAILS   OFFICE NOTE from referring provider  Internal 3/10/25 [facility]   OFFICE NOTE from other cardiologists  N/A    RECORDS from hospital/ED N/A    MEDICATION LIST Internal    GENERAL CARDIO RECORDS   (ALL APPOINTMENT TYPES)     LABS (CBC,BMP,CMP, TSH) Internal    EKG (STRIPS & REPORTS) N/A    MONITORS (STRIPS & REPORTS) N/A    ECHOS (IMAGES AND REPORTS) N/A    STRESS TESTS (IMAGES AND REPORTS) N/A    cMRI (IMAGES AND REPORTS) N/A    Cardiac cath (IMAGES AND REPORTS) N/A    CT/CTA (IMAGES AND REPORTS) N/A

## 2025-07-08 DIAGNOSIS — E84.9 CF (CYSTIC FIBROSIS) (H): ICD-10-CM

## 2025-07-08 DIAGNOSIS — K86.81 EXOCRINE PANCREATIC INSUFFICIENCY: ICD-10-CM

## 2025-07-12 ENCOUNTER — HEALTH MAINTENANCE LETTER (OUTPATIENT)
Age: 25
End: 2025-07-12

## 2025-08-27 DIAGNOSIS — E84.9 CYSTIC FIBROSIS (H): ICD-10-CM

## 2025-08-28 RX ORDER — ELEXACAFTOR, TEZACAFTOR, AND IVACAFTOR 100-50-75
KIT ORAL
Qty: 84 TABLET | Refills: 1 | Status: SHIPPED | OUTPATIENT
Start: 2025-08-28

## 2025-09-29 ENCOUNTER — PRE VISIT (OUTPATIENT)
Dept: CARDIOLOGY | Facility: CLINIC | Age: 25
End: 2025-09-29
Payer: COMMERCIAL

## (undated) DEVICE — NDL INSUFFLATION 14GA STEP S100000

## (undated) DEVICE — Device

## (undated) DEVICE — STRAP KNEE/BODY 31143004

## (undated) DEVICE — SU ETHILON 10-0 CS160-8 DA 12" 9030

## (undated) DEVICE — TUBING SUCTION MEDI-VAC 1/4"X20' N620A

## (undated) DEVICE — LINEN TOWEL PACK X30 5481

## (undated) DEVICE — JELLY LUBRICATING SURGILUBE 2OZ TUBE

## (undated) DEVICE — BLADE KNIFE SURG 11 371111

## (undated) DEVICE — DRAIN PENROSE 0.25"X18" LATEX FREE GR201

## (undated) DEVICE — SYR 05ML LL W/O NDL

## (undated) DEVICE — SU MONOCRYL 4-0 P-3 18" UND Y494G

## (undated) DEVICE — DEVICE CATH STABILIZATION STATLOCK PICC PLUS VPPDFP

## (undated) DEVICE — WIRE GUIDE NITINOL FLOPPY 6.0CM PLT TIP .018X60CM M001207110

## (undated) DEVICE — DRSG BIOPATCH GERMICIDAL SPLIT SPONGE 4MM MED 4150

## (undated) DEVICE — NDL 18GA 1.5" 305196

## (undated) DEVICE — SOL NACL 0.9% INJ 250ML BAG 2B1322Q

## (undated) DEVICE — GLOVE PROTEXIS BLUE W/NEU-THERA 8.0  2D73EB80

## (undated) DEVICE — TUBING INSUFFLATION W/FILTER 10FT GS1016

## (undated) DEVICE — LINEN TOWEL PACK X6 WHITE 5487

## (undated) DEVICE — COVER TRANSDUCER PROBE 7X24" 610-575

## (undated) DEVICE — RAD KNIFE HANDLE W/11 BLADE DISPOSABLE 371611

## (undated) DEVICE — GLOVE PROTEXIS W/NEU-THERA 7.5  2D73TE75

## (undated) DEVICE — NDL PERC ENTRY 21GA 7CM G01618

## (undated) DEVICE — SOL NACL 0.9% IRRIG 1000ML BOTTLE 2F7124

## (undated) DEVICE — COVER EASY EQUIP BAG W/BAND LATEX FREE EZ-28

## (undated) DEVICE — LUBRICANT INST KIT ENDO-LUBE 220-90

## (undated) DEVICE — GOWN XLG DISP 9545

## (undated) DEVICE — LINEN TOWEL PACK X5 5464

## (undated) DEVICE — SYR 10ML SLIP TIP W/O NDL

## (undated) DEVICE — CATH DILATOR SET 8-20FR G10397

## (undated) DEVICE — GLOVE PROTEXIS MICRO 7.5  2D73PM75

## (undated) DEVICE — SU ETHILON 3-0 PS-2 18" 1669H

## (undated) DEVICE — PAD CHUX UNDERPAD 30X30"

## (undated) DEVICE — ANTIFOG SOLUTION W/FOAM PAD 31142527

## (undated) DEVICE — CONNECTOR ONE-LINK INJECTION SITE LF 7N8399

## (undated) DEVICE — LINEN GOWN LG 5406

## (undated) DEVICE — SYR 10ML LL W/O NDL

## (undated) DEVICE — PREP TECHNI-CARE CHLOROXYLENOL 3% 4OZ BOTTLE C222-4ZWO

## (undated) DEVICE — SUCTION MANIFOLD DORNOCH ULTRA CART UL-CL500

## (undated) DEVICE — SPECIMEN CONTAINER URINE 90ML STERILE 75.1435.002

## (undated) DEVICE — ENDO VALVE SUCTION BRONCH EVIS MAJ-209

## (undated) DEVICE — SU VICRYL 2-0 SH 27" UND J417H

## (undated) DEVICE — DECANTER TRANSFER DEVICE 2008S

## (undated) DEVICE — PREP CHLORAPREP 26ML TINTED ORANGE  260815

## (undated) DEVICE — DRSG TEGADERM IV ADVANCED 2.5X2.75" 1683

## (undated) RX ORDER — DEXAMETHASONE SODIUM PHOSPHATE 4 MG/ML
INJECTION, SOLUTION INTRA-ARTICULAR; INTRALESIONAL; INTRAMUSCULAR; INTRAVENOUS; SOFT TISSUE
Status: DISPENSED
Start: 2017-10-02

## (undated) RX ORDER — FENTANYL CITRATE 50 UG/ML
INJECTION, SOLUTION INTRAMUSCULAR; INTRAVENOUS
Status: DISPENSED
Start: 2017-10-16

## (undated) RX ORDER — PROPOFOL 10 MG/ML
INJECTION, EMULSION INTRAVENOUS
Status: DISPENSED
Start: 2017-10-02

## (undated) RX ORDER — PROPOFOL 10 MG/ML
INJECTION, EMULSION INTRAVENOUS
Status: DISPENSED
Start: 2017-10-16

## (undated) RX ORDER — LIDOCAINE HYDROCHLORIDE 10 MG/ML
INJECTION, SOLUTION EPIDURAL; INFILTRATION; INTRACAUDAL; PERINEURAL
Status: DISPENSED
Start: 2017-07-05

## (undated) RX ORDER — KETOROLAC TROMETHAMINE 30 MG/ML
INJECTION, SOLUTION INTRAMUSCULAR; INTRAVENOUS
Status: DISPENSED
Start: 2017-10-16

## (undated) RX ORDER — ACETAMINOPHEN 500 MG
TABLET ORAL
Status: DISPENSED
Start: 2017-10-16

## (undated) RX ORDER — ONDANSETRON 2 MG/ML
INJECTION INTRAMUSCULAR; INTRAVENOUS
Status: DISPENSED
Start: 2017-10-02

## (undated) RX ORDER — CEFAZOLIN SODIUM 1 G/3ML
INJECTION, POWDER, FOR SOLUTION INTRAMUSCULAR; INTRAVENOUS
Status: DISPENSED
Start: 2017-10-16

## (undated) RX ORDER — ALBUTEROL SULFATE 0.83 MG/ML
SOLUTION RESPIRATORY (INHALATION)
Status: DISPENSED
Start: 2017-10-02

## (undated) RX ORDER — ONDANSETRON 2 MG/ML
INJECTION INTRAMUSCULAR; INTRAVENOUS
Status: DISPENSED
Start: 2017-10-16

## (undated) RX ORDER — FENTANYL CITRATE 50 UG/ML
INJECTION, SOLUTION INTRAMUSCULAR; INTRAVENOUS
Status: DISPENSED
Start: 2017-10-02